# Patient Record
Sex: FEMALE | Race: WHITE | NOT HISPANIC OR LATINO | Employment: OTHER | URBAN - METROPOLITAN AREA
[De-identification: names, ages, dates, MRNs, and addresses within clinical notes are randomized per-mention and may not be internally consistent; named-entity substitution may affect disease eponyms.]

---

## 2017-05-08 ENCOUNTER — OFFICE VISIT - HEALTHEAST (OUTPATIENT)
Dept: FAMILY MEDICINE | Facility: CLINIC | Age: 82
End: 2017-05-08

## 2017-05-08 DIAGNOSIS — R22.2 LUMP IN CHEST: ICD-10-CM

## 2017-05-08 DIAGNOSIS — E03.9 HYPOTHYROIDISM: ICD-10-CM

## 2017-05-08 DIAGNOSIS — I10 ESSENTIAL HYPERTENSION: ICD-10-CM

## 2017-05-08 DIAGNOSIS — R53.83 FATIGUE: ICD-10-CM

## 2017-05-08 ASSESSMENT — MIFFLIN-ST. JEOR: SCORE: 1104.93

## 2017-05-10 ENCOUNTER — COMMUNICATION - HEALTHEAST (OUTPATIENT)
Dept: FAMILY MEDICINE | Facility: CLINIC | Age: 82
End: 2017-05-10

## 2017-06-08 ENCOUNTER — RECORDS - HEALTHEAST (OUTPATIENT)
Dept: GENERAL RADIOLOGY | Facility: CLINIC | Age: 82
End: 2017-06-08

## 2017-06-08 ENCOUNTER — OFFICE VISIT - HEALTHEAST (OUTPATIENT)
Dept: FAMILY MEDICINE | Facility: CLINIC | Age: 82
End: 2017-06-08

## 2017-06-08 DIAGNOSIS — R22.9 LOCALIZED SWELLING, MASS AND LUMP, UNSPECIFIED: ICD-10-CM

## 2017-06-08 DIAGNOSIS — R22.2 LUMP IN CHEST: ICD-10-CM

## 2017-06-08 ASSESSMENT — MIFFLIN-ST. JEOR: SCORE: 1104.93

## 2017-06-21 ENCOUNTER — COMMUNICATION - HEALTHEAST (OUTPATIENT)
Dept: FAMILY MEDICINE | Facility: CLINIC | Age: 82
End: 2017-06-21

## 2017-08-07 ENCOUNTER — COMMUNICATION - HEALTHEAST (OUTPATIENT)
Dept: FAMILY MEDICINE | Facility: CLINIC | Age: 82
End: 2017-08-07

## 2017-08-11 ENCOUNTER — OFFICE VISIT - HEALTHEAST (OUTPATIENT)
Dept: RHEUMATOLOGY | Facility: CLINIC | Age: 82
End: 2017-08-11

## 2017-08-11 DIAGNOSIS — M25.511 PAIN OF RIGHT STERNOCLAVICULAR JOINT: ICD-10-CM

## 2017-08-11 DIAGNOSIS — M17.0 PRIMARY OSTEOARTHRITIS OF BOTH KNEES: ICD-10-CM

## 2017-08-11 DIAGNOSIS — M89.8X8 STERNAL MASS: ICD-10-CM

## 2017-08-11 ASSESSMENT — MIFFLIN-ST. JEOR: SCORE: 1113.1

## 2017-08-21 ENCOUNTER — COMMUNICATION - HEALTHEAST (OUTPATIENT)
Dept: FAMILY MEDICINE | Facility: CLINIC | Age: 82
End: 2017-08-21

## 2017-08-21 DIAGNOSIS — E03.9 HYPOTHYROID: ICD-10-CM

## 2017-08-25 ENCOUNTER — HOSPITAL ENCOUNTER (OUTPATIENT)
Dept: CT IMAGING | Facility: HOSPITAL | Age: 82
Discharge: HOME | End: 2017-08-25
Attending: INTERNAL MEDICINE

## 2017-08-25 DIAGNOSIS — M89.9 DISORDER OF BONE, UNSPECIFIED: ICD-10-CM

## 2017-08-25 DIAGNOSIS — M89.8X8 STERNAL MASS: ICD-10-CM

## 2017-08-25 DIAGNOSIS — M25.511 PAIN OF RIGHT STERNOCLAVICULAR JOINT: ICD-10-CM

## 2017-08-30 ENCOUNTER — COMMUNICATION - HEALTHEAST (OUTPATIENT)
Dept: FAMILY MEDICINE | Facility: CLINIC | Age: 82
End: 2017-08-30

## 2017-08-30 DIAGNOSIS — F41.9 ANXIETY: ICD-10-CM

## 2017-11-09 ENCOUNTER — OFFICE VISIT - HEALTHEAST (OUTPATIENT)
Dept: RHEUMATOLOGY | Facility: CLINIC | Age: 82
End: 2017-11-09

## 2017-11-09 DIAGNOSIS — M17.0 PRIMARY OSTEOARTHRITIS OF BOTH KNEES: ICD-10-CM

## 2017-11-09 ASSESSMENT — MIFFLIN-ST. JEOR: SCORE: 1113.1

## 2017-12-08 ENCOUNTER — OFFICE VISIT - HEALTHEAST (OUTPATIENT)
Dept: FAMILY MEDICINE | Facility: CLINIC | Age: 82
End: 2017-12-08

## 2017-12-08 DIAGNOSIS — R53.83 FATIGUE: ICD-10-CM

## 2017-12-08 DIAGNOSIS — E03.9 HYPOTHYROIDISM: ICD-10-CM

## 2017-12-08 ASSESSMENT — MIFFLIN-ST. JEOR: SCORE: 1114

## 2017-12-12 ENCOUNTER — COMMUNICATION - HEALTHEAST (OUTPATIENT)
Dept: FAMILY MEDICINE | Facility: CLINIC | Age: 82
End: 2017-12-12

## 2017-12-12 DIAGNOSIS — F41.9 ANXIETY: ICD-10-CM

## 2018-06-11 ENCOUNTER — COMMUNICATION - HEALTHEAST (OUTPATIENT)
Dept: FAMILY MEDICINE | Facility: CLINIC | Age: 83
End: 2018-06-11

## 2018-06-11 DIAGNOSIS — E03.9 HYPOTHYROID: ICD-10-CM

## 2018-09-17 ENCOUNTER — OFFICE VISIT - HEALTHEAST (OUTPATIENT)
Dept: FAMILY MEDICINE | Facility: CLINIC | Age: 83
End: 2018-09-17

## 2018-09-17 DIAGNOSIS — E78.5 HYPERLIPIDEMIA: ICD-10-CM

## 2018-09-17 DIAGNOSIS — E03.9 HYPOTHYROIDISM: ICD-10-CM

## 2018-09-17 DIAGNOSIS — I10 ESSENTIAL HYPERTENSION: ICD-10-CM

## 2018-09-17 DIAGNOSIS — R53.83 FATIGUE: ICD-10-CM

## 2018-09-17 LAB
ALBUMIN SERPL-MCNC: 3.8 G/DL (ref 3.5–5)
ALP SERPL-CCNC: 60 U/L (ref 45–120)
ALT SERPL W P-5'-P-CCNC: 11 U/L (ref 0–45)
ANION GAP SERPL CALCULATED.3IONS-SCNC: 10 MMOL/L (ref 5–18)
AST SERPL W P-5'-P-CCNC: 16 U/L (ref 0–40)
BILIRUB DIRECT SERPL-MCNC: <0.1 MG/DL
BILIRUB SERPL-MCNC: 0.3 MG/DL (ref 0–1)
BUN SERPL-MCNC: 26 MG/DL (ref 8–28)
CALCIUM SERPL-MCNC: 9.7 MG/DL (ref 8.5–10.5)
CHLORIDE BLD-SCNC: 106 MMOL/L (ref 98–107)
CO2 SERPL-SCNC: 25 MMOL/L (ref 22–31)
CREAT SERPL-MCNC: 1.16 MG/DL (ref 0.6–1.1)
ERYTHROCYTE [DISTWIDTH] IN BLOOD BY AUTOMATED COUNT: 13.9 % (ref 11–14.5)
FERRITIN SERPL-MCNC: 14 NG/ML (ref 10–130)
GFR SERPL CREATININE-BSD FRML MDRD: 45 ML/MIN/1.73M2
GLUCOSE BLD-MCNC: 98 MG/DL (ref 70–125)
HCT VFR BLD AUTO: 42 % (ref 35–47)
HGB BLD-MCNC: 13.9 G/DL (ref 12–16)
MCH RBC QN AUTO: 29.6 PG (ref 27–34)
MCHC RBC AUTO-ENTMCNC: 33 G/DL (ref 32–36)
MCV RBC AUTO: 90 FL (ref 80–100)
PLATELET # BLD AUTO: 167 THOU/UL (ref 140–440)
PMV BLD AUTO: 7.2 FL (ref 7–10)
POTASSIUM BLD-SCNC: 4.8 MMOL/L (ref 3.5–5)
PROT SERPL-MCNC: 6.7 G/DL (ref 6–8)
RBC # BLD AUTO: 4.69 MILL/UL (ref 3.8–5.4)
SODIUM SERPL-SCNC: 141 MMOL/L (ref 136–145)
TSH SERPL DL<=0.005 MIU/L-ACNC: 1.63 UIU/ML (ref 0.3–5)
WBC: 6.1 THOU/UL (ref 4–11)

## 2018-09-18 LAB — 25(OH)D3 SERPL-MCNC: 21.4 NG/ML (ref 30–80)

## 2018-12-04 ENCOUNTER — COMMUNICATION - HEALTHEAST (OUTPATIENT)
Dept: FAMILY MEDICINE | Facility: CLINIC | Age: 83
End: 2018-12-04

## 2018-12-04 DIAGNOSIS — F41.9 ANXIETY: ICD-10-CM

## 2019-01-19 ENCOUNTER — COMMUNICATION - HEALTHEAST (OUTPATIENT)
Dept: FAMILY MEDICINE | Facility: CLINIC | Age: 84
End: 2019-01-19

## 2019-01-19 DIAGNOSIS — F41.9 ANXIETY: ICD-10-CM

## 2019-03-26 ENCOUNTER — COMMUNICATION - HEALTHEAST (OUTPATIENT)
Dept: FAMILY MEDICINE | Facility: CLINIC | Age: 84
End: 2019-03-26

## 2019-03-26 DIAGNOSIS — E03.9 HYPOTHYROID: ICD-10-CM

## 2019-04-30 ENCOUNTER — COMMUNICATION - HEALTHEAST (OUTPATIENT)
Dept: FAMILY MEDICINE | Facility: CLINIC | Age: 84
End: 2019-04-30

## 2019-04-30 DIAGNOSIS — F41.9 ANXIETY: ICD-10-CM

## 2019-05-23 ENCOUNTER — COMMUNICATION - HEALTHEAST (OUTPATIENT)
Dept: FAMILY MEDICINE | Facility: CLINIC | Age: 84
End: 2019-05-23

## 2019-06-14 ENCOUNTER — OFFICE VISIT - HEALTHEAST (OUTPATIENT)
Dept: FAMILY MEDICINE | Facility: CLINIC | Age: 84
End: 2019-06-14

## 2019-06-14 DIAGNOSIS — N18.30 CHRONIC KIDNEY DISEASE, STAGE III (MODERATE) (H): ICD-10-CM

## 2019-06-14 DIAGNOSIS — E78.5 HYPERLIPIDEMIA, UNSPECIFIED HYPERLIPIDEMIA TYPE: ICD-10-CM

## 2019-06-14 DIAGNOSIS — R79.9 ABNORMAL FINDING OF BLOOD CHEMISTRY: ICD-10-CM

## 2019-06-14 DIAGNOSIS — R07.89 STERNUM PAIN: ICD-10-CM

## 2019-06-14 DIAGNOSIS — E03.9 HYPOTHYROIDISM, UNSPECIFIED TYPE: ICD-10-CM

## 2019-06-14 DIAGNOSIS — I10 ESSENTIAL HYPERTENSION: ICD-10-CM

## 2019-06-14 LAB
ANION GAP SERPL CALCULATED.3IONS-SCNC: 10 MMOL/L (ref 5–18)
BUN SERPL-MCNC: 20 MG/DL (ref 8–28)
CALCIUM SERPL-MCNC: 9.4 MG/DL (ref 8.5–10.5)
CHLORIDE BLD-SCNC: 103 MMOL/L (ref 98–107)
CO2 SERPL-SCNC: 26 MMOL/L (ref 22–31)
CREAT SERPL-MCNC: 0.94 MG/DL (ref 0.6–1.1)
ERYTHROCYTE [DISTWIDTH] IN BLOOD BY AUTOMATED COUNT: 13 % (ref 11–14.5)
FERRITIN SERPL-MCNC: 14 NG/ML (ref 10–130)
GFR SERPL CREATININE-BSD FRML MDRD: 57 ML/MIN/1.73M2
GLUCOSE BLD-MCNC: 96 MG/DL (ref 70–125)
HCT VFR BLD AUTO: 39.9 % (ref 35–47)
HGB BLD-MCNC: 13.4 G/DL (ref 12–16)
MCH RBC QN AUTO: 30.2 PG (ref 27–34)
MCHC RBC AUTO-ENTMCNC: 33.6 G/DL (ref 32–36)
MCV RBC AUTO: 90 FL (ref 80–100)
PLATELET # BLD AUTO: 190 THOU/UL (ref 140–440)
PMV BLD AUTO: 6.9 FL (ref 7–10)
POTASSIUM BLD-SCNC: 4.3 MMOL/L (ref 3.5–5)
RBC # BLD AUTO: 4.44 MILL/UL (ref 3.8–5.4)
SODIUM SERPL-SCNC: 139 MMOL/L (ref 136–145)
TSH SERPL DL<=0.005 MIU/L-ACNC: 1.47 UIU/ML (ref 0.3–5)
WBC: 4.7 THOU/UL (ref 4–11)

## 2019-06-18 ENCOUNTER — HOSPITAL ENCOUNTER (OUTPATIENT)
Dept: CT IMAGING | Facility: HOSPITAL | Age: 84
Discharge: HOME | End: 2019-06-18
Attending: FAMILY MEDICINE

## 2019-06-18 DIAGNOSIS — R07.89 STERNUM PAIN: ICD-10-CM

## 2019-06-25 ENCOUNTER — COMMUNICATION - HEALTHEAST (OUTPATIENT)
Dept: FAMILY MEDICINE | Facility: CLINIC | Age: 84
End: 2019-06-25

## 2019-06-26 ENCOUNTER — COMMUNICATION - HEALTHEAST (OUTPATIENT)
Dept: FAMILY MEDICINE | Facility: CLINIC | Age: 84
End: 2019-06-26

## 2019-06-26 ENCOUNTER — AMBULATORY - HEALTHEAST (OUTPATIENT)
Dept: FAMILY MEDICINE | Facility: CLINIC | Age: 84
End: 2019-06-26

## 2019-07-17 ENCOUNTER — RECORDS - HEALTHEAST (OUTPATIENT)
Dept: ADMINISTRATIVE | Facility: OTHER | Age: 84
End: 2019-07-17

## 2019-08-06 ENCOUNTER — COMMUNICATION - HEALTHEAST (OUTPATIENT)
Dept: FAMILY MEDICINE | Facility: CLINIC | Age: 84
End: 2019-08-06

## 2019-08-06 DIAGNOSIS — F41.9 ANXIETY: ICD-10-CM

## 2019-08-30 ENCOUNTER — OFFICE VISIT - HEALTHEAST (OUTPATIENT)
Dept: FAMILY MEDICINE | Facility: CLINIC | Age: 84
End: 2019-08-30

## 2019-08-30 DIAGNOSIS — M89.8X8 STERNAL MASS: ICD-10-CM

## 2019-08-30 DIAGNOSIS — E03.9 HYPOTHYROIDISM, UNSPECIFIED TYPE: ICD-10-CM

## 2019-08-30 DIAGNOSIS — R53.83 FATIGUE, UNSPECIFIED TYPE: ICD-10-CM

## 2019-08-30 DIAGNOSIS — N18.30 CHRONIC KIDNEY DISEASE, STAGE III (MODERATE) (H): ICD-10-CM

## 2019-09-25 ENCOUNTER — RECORDS - HEALTHEAST (OUTPATIENT)
Dept: ADMINISTRATIVE | Facility: OTHER | Age: 84
End: 2019-09-25

## 2019-09-30 ENCOUNTER — COMMUNICATION - HEALTHEAST (OUTPATIENT)
Dept: FAMILY MEDICINE | Facility: CLINIC | Age: 84
End: 2019-09-30

## 2019-09-30 DIAGNOSIS — E03.9 HYPOTHYROID: ICD-10-CM

## 2019-10-29 ENCOUNTER — RECORDS - HEALTHEAST (OUTPATIENT)
Dept: ADMINISTRATIVE | Facility: OTHER | Age: 84
End: 2019-10-29

## 2019-11-04 ENCOUNTER — OFFICE VISIT - HEALTHEAST (OUTPATIENT)
Dept: FAMILY MEDICINE | Facility: CLINIC | Age: 84
End: 2019-11-04

## 2019-11-04 DIAGNOSIS — I10 ESSENTIAL HYPERTENSION: ICD-10-CM

## 2019-11-04 DIAGNOSIS — M89.8X8 STERNAL MASS: ICD-10-CM

## 2019-11-04 DIAGNOSIS — M25.512 PAIN OF LEFT STERNOCLAVICULAR JOINT: ICD-10-CM

## 2019-11-04 DIAGNOSIS — E03.9 HYPOTHYROIDISM, UNSPECIFIED TYPE: ICD-10-CM

## 2020-01-14 ENCOUNTER — COMMUNICATION - HEALTHEAST (OUTPATIENT)
Dept: FAMILY MEDICINE | Facility: CLINIC | Age: 85
End: 2020-01-14

## 2020-01-14 DIAGNOSIS — F41.9 ANXIETY: ICD-10-CM

## 2020-01-21 ENCOUNTER — OFFICE VISIT - HEALTHEAST (OUTPATIENT)
Dept: FAMILY MEDICINE | Facility: CLINIC | Age: 85
End: 2020-01-21

## 2020-01-21 DIAGNOSIS — R03.0 ELEVATED BLOOD PRESSURE READING WITHOUT DIAGNOSIS OF HYPERTENSION: ICD-10-CM

## 2020-01-21 DIAGNOSIS — H61.21 IMPACTED CERUMEN OF RIGHT EAR: ICD-10-CM

## 2020-01-21 ASSESSMENT — MIFFLIN-ST. JEOR: SCORE: 1121.09

## 2020-02-19 ENCOUNTER — COMMUNICATION - HEALTHEAST (OUTPATIENT)
Dept: FAMILY MEDICINE | Facility: CLINIC | Age: 85
End: 2020-02-19

## 2020-02-19 DIAGNOSIS — F41.9 ANXIETY: ICD-10-CM

## 2020-02-22 RX ORDER — SERTRALINE HYDROCHLORIDE 100 MG/1
TABLET, FILM COATED ORAL
Qty: 90 TABLET | Refills: 2 | Status: SHIPPED | OUTPATIENT
Start: 2020-02-22 | End: 2021-07-13

## 2020-06-16 ENCOUNTER — RECORDS - HEALTHEAST (OUTPATIENT)
Dept: ADMINISTRATIVE | Facility: OTHER | Age: 85
End: 2020-06-16

## 2020-07-15 ENCOUNTER — COMMUNICATION - HEALTHEAST (OUTPATIENT)
Dept: FAMILY MEDICINE | Facility: CLINIC | Age: 85
End: 2020-07-15

## 2020-07-15 DIAGNOSIS — E03.9 HYPOTHYROID: ICD-10-CM

## 2020-08-21 ENCOUNTER — COMMUNICATION - HEALTHEAST (OUTPATIENT)
Dept: FAMILY MEDICINE | Facility: CLINIC | Age: 85
End: 2020-08-21

## 2020-08-21 DIAGNOSIS — E03.9 HYPOTHYROID: ICD-10-CM

## 2020-08-26 RX ORDER — LEVOTHYROXINE SODIUM 50 UG/1
50 TABLET ORAL DAILY
Qty: 90 TABLET | Refills: 1 | Status: SHIPPED | OUTPATIENT
Start: 2020-08-26 | End: 2021-07-13

## 2020-09-18 ENCOUNTER — RECORDS - HEALTHEAST (OUTPATIENT)
Dept: ADMINISTRATIVE | Facility: OTHER | Age: 85
End: 2020-09-18

## 2021-01-05 ENCOUNTER — RECORDS - HEALTHEAST (OUTPATIENT)
Dept: ADMINISTRATIVE | Facility: OTHER | Age: 86
End: 2021-01-05

## 2021-04-01 ENCOUNTER — COMMUNICATION - HEALTHEAST (OUTPATIENT)
Dept: FAMILY MEDICINE | Facility: CLINIC | Age: 86
End: 2021-04-01

## 2021-04-03 ENCOUNTER — COMMUNICATION - HEALTHEAST (OUTPATIENT)
Dept: FAMILY MEDICINE | Facility: CLINIC | Age: 86
End: 2021-04-03

## 2021-04-03 DIAGNOSIS — F41.9 ANXIETY: ICD-10-CM

## 2021-04-03 RX ORDER — TRAZODONE HYDROCHLORIDE 50 MG/1
TABLET, FILM COATED ORAL
Qty: 180 TABLET | Refills: 2 | Status: SHIPPED | OUTPATIENT
Start: 2021-04-03 | End: 2022-01-15

## 2021-04-06 ENCOUNTER — RECORDS - HEALTHEAST (OUTPATIENT)
Dept: ADMINISTRATIVE | Facility: OTHER | Age: 86
End: 2021-04-06

## 2021-05-24 ENCOUNTER — RECORDS - HEALTHEAST (OUTPATIENT)
Dept: ADMINISTRATIVE | Facility: CLINIC | Age: 86
End: 2021-05-24

## 2021-05-25 ENCOUNTER — RECORDS - HEALTHEAST (OUTPATIENT)
Dept: ADMINISTRATIVE | Facility: CLINIC | Age: 86
End: 2021-05-25

## 2021-05-26 ENCOUNTER — RECORDS - HEALTHEAST (OUTPATIENT)
Dept: ADMINISTRATIVE | Facility: CLINIC | Age: 86
End: 2021-05-26

## 2021-05-27 ENCOUNTER — RECORDS - HEALTHEAST (OUTPATIENT)
Dept: ADMINISTRATIVE | Facility: CLINIC | Age: 86
End: 2021-05-27

## 2021-05-28 ENCOUNTER — RECORDS - HEALTHEAST (OUTPATIENT)
Dept: ADMINISTRATIVE | Facility: CLINIC | Age: 86
End: 2021-05-28

## 2021-05-28 NOTE — TELEPHONE ENCOUNTER
Refill Approved    Rx renewed per Medication Renewal Policy. Medication was last renewed on 1/19/19.    Kita Mitchell, [facility] Triage/Med Refill 5/1/2019     Requested Prescriptions   Pending Prescriptions Disp Refills     sertraline (ZOLOFT) 100 MG tablet [Pharmacy Med Name: SERTRALINE  MG TABLET] 90 tablet 0     Sig: TAKE 1 TABLET (100 MG TOTAL) BY MOUTH DAILY.       SS[facility] Refill Protocol  Passed - 4/30/2019 12:18 PM        Passed - PCP or prescribing provider visit in last year     Last office visit with prescriber/PCP: 9/17/2018 Edin Dailey MD OR same dept: 9/17/2018 Edin Dailey MD OR same specialty: 9/17/2018 Edin Dailey MD  Last physical: Visit date not found Last MTM visit: Visit date not found   Next visit within 3 mo: Visit date not found  Next physical within 3 mo: Visit date not found  Prescriber OR PCP: Edin Dailey MD  Last diagnosis associated with med order: 1. Anxiety  - sertraline (ZOLOFT) 100 MG tablet [Pharmacy Med Name: SERTRALINE  MG TABLET]; TAKE 1 TABLET (100 MG TOTAL) BY MOUTH DAILY.  Dispense: 90 tablet; Refill: 0    If protocol passes may refill for 12 months if within 3 months of last provider visit (or a total of 15 months).

## 2021-05-29 ENCOUNTER — RECORDS - HEALTHEAST (OUTPATIENT)
Dept: ADMINISTRATIVE | Facility: CLINIC | Age: 86
End: 2021-05-29

## 2021-05-29 NOTE — PROGRESS NOTES
Assessment/ Plan     1. Hypertension, currently stable    Continue to monitor blood pressure  Check laboratory testing as noted  - Basic Metabolic Panel  - HM2(CBC w/o Differential)  - Ferritin    2. Hyperlipidemia, unspecified hyperlipidemia type    Check a lipid cascade in the future  Calculate 10-year cardiovascular risk    3. Hypothyroidism, unspecified type    Continue levothyroxine  Check a thyroid cascade and adjust levothyroxine as appropriate  - Thyroid Cascade    4. Chronic kidney disease, stage III (moderate) (H)    Continue to monitor kidney function  Recommend avoiding NSAIDs  Recommend adequate control blood pressure  Continue to monitor    5. Sternum pain  May be secondary to significant osteoarthritis    The skin of the chest given that this has changed significantly  - CT Chest With Contrast; Future    6. Abnormal finding of blood chemistry   History of low ferritin    Check a ferritin level  - Ferritin    7. Vitamin D deficiency    Recommend vitamin D3 supplementation      Subjective:       Remedios Vincent is a 85 y.o. female who presents  to the clinic in follow-up.  She has no history of hypertension, hyperlipidemia, hypothyroidism, and chronic kidney disease.  In the past she has followed up her significant fatigue.  She reports feeling tired much of the time.  She did previously have an evaluation at [facility] and had an iron deficiency anemia at that time.  They did discuss possible iron infusions if her ferritin level would drop.    She does have a history of significant anxiety and depression symptoms and takes sertraline.  For sleep she takes trazodone typically.  Fatigue has been an ongoing issue.  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  She has noted that her sternum has changed in appearance and this can cause some discomfort.  She would like to have this evaluated.  She has had an extensive evaluation previously conservative treatment has  been recommended.    She continues to live independently.  Is a good social support and is active and playing cards.  She interacts with others. She denies having significant depression symptoms currently.      Review of systems is otherwise negative for headache, dizziness, chest pain, palpitations, or other concerns.       The following portions of the patient's history were reviewed and updated as appropriate: allergies, current medications, past family history, past medical history, past social history, past surgical history and problem list. Medications have been reconciled    Review of Systems   A 12 point comprehensive review of systems was negative except as noted.      Current Outpatient Medications   Medication Sig Dispense Refill     levothyroxine (SYNTHROID, LEVOTHROID) 50 MCG tablet TAKE 1 TABLET (50 MCG TOTAL) BY MOUTH DAILY. 90 tablet 1     sertraline (ZOLOFT) 100 MG tablet TAKE 1 TABLET (100 MG TOTAL) BY MOUTH DAILY. 90 tablet 0     traZODone (DESYREL) 50 MG tablet TAKE 1-2 TABLETS BY MOUTH EVERY NIGHT AT BEDTIME AS NEEDED . 180 tablet 2     No current facility-administered medications for this visit.        Objective:      /88   Pulse 88   Wt 150 lb 9.6 oz (68.3 kg)   BMI 25.85 kg/m        General appearance: alert, appears stated age and cooperative  Head: Normocephalic, without obvious abnormality, atraumatic  Eyes: conjunctivae/corneas clear. PERRL, EOM's intact.   Nose: Nares normal. Septum midline. Mucosa normal. No drainage or sinus tenderness.  Throat: lips, mucosa, and tongue normal; teeth and gums normal  Neck: no adenopathy, supple, symmetrical, trachea midline   Lungs: clear to auscultation bilaterally  Heart: regular rate and rhythm, S1, S2 normal, no murmur, click, rub or gallop  Chest: There is a palpable firm protuberance noted overlying the sternum  There is hypertrophy noted at the left sternoclavicular joint  Extremities: extremities normal, atraumatic, no cyanosis or  edema  Skin: Skin color, texture, turgor normal. No rashes or lesions  Lymph nodes: Cervical nodes normal.  Neurologic: Alert and oriented X 3         No results found for this or any previous visit (from the past 168 hour(s)).       This note has been dictated using voice recognition software. Any grammatical or context distortions are unintentional and inherent to the software

## 2021-05-29 NOTE — PATIENT INSTRUCTIONS - HE
We will check the tests as we described  I recommend vitamin D3, 2,000 units daily  Set up the CT scan of the chest as discussed.

## 2021-05-30 VITALS — HEIGHT: 64 IN | WEIGHT: 151 LBS | BODY MASS INDEX: 25.78 KG/M2

## 2021-05-30 NOTE — TELEPHONE ENCOUNTER
I called and reviewed.  Her CT scan showed degenerative changes but no worrisome findings.  She does have a hiatal hernia  and reflux symptoms have been well controlled.  She denies any respiratory concerns.  Reviewed her laboratory testing as well.

## 2021-05-30 NOTE — TELEPHONE ENCOUNTER
Test Results  Who is calling?:  Patient  Who ordered the test:  Edin Dailey MD   Type of test: Lab and Imaging  Date of test:  Labs 6/14/19 and CT on 6/18/19  Where was the test performed:  [facility]  What are your questions/concerns?:  Patient stated she has not received her results yet. Please call her and mail her results.  Okay to leave a detailed message?:  Yes

## 2021-05-31 ENCOUNTER — RECORDS - HEALTHEAST (OUTPATIENT)
Dept: ADMINISTRATIVE | Facility: CLINIC | Age: 86
End: 2021-05-31

## 2021-05-31 VITALS — BODY MASS INDEX: 25.78 KG/M2 | HEIGHT: 64 IN | WEIGHT: 151 LBS

## 2021-05-31 VITALS — BODY MASS INDEX: 26.09 KG/M2 | WEIGHT: 152.8 LBS | HEIGHT: 64 IN

## 2021-05-31 VITALS — WEIGHT: 152.8 LBS | BODY MASS INDEX: 26.09 KG/M2 | HEIGHT: 64 IN

## 2021-05-31 VITALS — BODY MASS INDEX: 26.12 KG/M2 | WEIGHT: 153 LBS | HEIGHT: 64 IN

## 2021-05-31 NOTE — TELEPHONE ENCOUNTER
RN cannot approve Refill Request    RN can NOT refill this medication discontinued on 6/26/2019 by Edin Dailey MD for the following reason: Therapy completed. Last office visit: 6/14/2019 Edin Dailey MD Last Physical: Visit date not found Last MTM visit: Visit date not found Last visit same specialty: 6/14/2019 Edin Dailey MD.  Next visit within 3 mo: Visit date not found  Next physical within 3 mo: Visit date not found      Fransisco Musa, [facility] Triage/Med Refill 8/6/2019    Requested Prescriptions   Pending Prescriptions Disp Refills     sertraline (ZOLOFT) 100 MG tablet [Pharmacy Med Name: SERTRALINE  MG TABLET] 90 tablet 0     Sig: TAKE 1 TABLET (100 MG TOTAL) BY MOUTH DAILY.       SSRI Refill Protocol  Passed - 8/6/2019  1:37 AM        Passed - PCP or prescribing provider visit in last year     Last office visit with prescriber/PCP: 6/14/2019 Edin Dailey MD OR same dept: 6/14/2019 Edin Dailey MD OR same specialty: 6/14/2019 Edin Dailey MD  Last physical: Visit date not found Last MTM visit: Visit date not found   Next visit within 3 mo: Visit date not found  Next physical within 3 mo: Visit date not found  Prescriber OR PCP: Edin Dailey MD  Last diagnosis associated with med order: 1. Anxiety  - sertraline (ZOLOFT) 100 MG tablet [Pharmacy Med Name: SERTRALINE  MG TABLET]; TAKE 1 TABLET (100 MG TOTAL) BY MOUTH DAILY.  Dispense: 90 tablet; Refill: 0    If protocol passes may refill for 12 months if within 3 months of last provider visit (or a total of 15 months).

## 2021-05-31 NOTE — PATIENT INSTRUCTIONS - HE
You can consider trazodone (1-2 a day)  You can try melatonin as well 3 mg  Continue sertraline and levothyroxine at current doses

## 2021-06-01 NOTE — TELEPHONE ENCOUNTER
Refill Approved    Rx renewed per Medication Renewal Policy. Medication was last renewed on 3/26/19.    Kita Mitchell, [facility] Triage/Med Refill 9/30/2019     Requested Prescriptions   Pending Prescriptions Disp Refills     levothyroxine (SYNTHROID, LEVOTHROID) 50 MCG tablet [Pharmacy Med Name: LEVOTHYROXINE 50 MCG TABLET] 90 tablet 1     Sig: TAKE 1 TABLET (50 MCG TOTAL) BY MOUTH DAILY.       Thyroid Hormones Protocol Passed - 9/30/2019  2:16 AM        Passed - Provider visit in past 12 months or next 3 months     Last office visit with prescriber/PCP: 8/30/2019 Edin Dailey MD OR same dept: 8/30/2019 Edin Dailey MD OR same specialty: 8/30/2019 Edin Dailey MD  Last physical: Visit date not found Last MTM visit: Visit date not found   Next visit within 3 mo: Visit date not found  Next physical within 3 mo: Visit date not found  Prescriber OR PCP: Edin Dailey MD  Last diagnosis associated with med order: 1. Hypothyroid  - levothyroxine (SYNTHROID, LEVOTHROID) 50 MCG tablet [Pharmacy Med Name: LEVOTHYROXINE 50 MCG TABLET]; TAKE 1 TABLET (50 MCG TOTAL) BY MOUTH DAILY.  Dispense: 90 tablet; Refill: 1    If protocol passes may refill for 12 months if within 3 months of last provider visit (or a total of 15 months).             Passed - TSH on file in past 12 months for patient age 12 & older     TSH   Date Value Ref Range Status   06/14/2019 1.47 0.30 - 5.00 uIU/mL Final

## 2021-06-01 NOTE — PROGRESS NOTES
Assessment/ Plan     1. Sternal mass    She has significant arthritic changes at the left sternoclavicular joint  She has previously followed up with rheumatology  We will continue to monitor.  If causing any kind of obstructive symptoms that we will get surgical consultation  At this point this is not impeding breathing or eating and patient will monitor    2. Hypothyroidism, unspecified type    TSH was not June, 2019  Continue levothyroxine    3. Fatigue, unspecified type  Likely multifactorial  Her hemoglobin is normal but ferritin is borderline low  Consider secondary to aging  She does have  hypothyroidism but that is relatively well controlled    Reviewed sleep hygiene  Patient may increase trazodone to  mg nightly    4. Chronic kidney disease, stage III (moderate) (H)    Recommend avoiding NSAIDs  Continue to monitor    5. Anxiety    Continue sertraline  Follow-up as advised      Subjective:       Remedios Vincent is a 85 y.o. female who presents to the clinic in follow-up.  She has a known history of hypertension, hyperlipidemia, hypothyroidism, and chronic kidney disease.  Since the last visit she was referred for CT scan of the chest as she has a prominent area of arthritis at the sternoclavicular joint.  She does feel that this is gotten bigger but denies any concerns with breathing or with swallowing food at this time.  This will be monitored.  Additionally, she does have a history of fatigue.  She has followed up with the [facility] in the past and did have a history of iron deficiency.  Her ferritin level is borderline low but hemoglobin is normal.  Iron infusions were discussed in the past.  She continues to take levothyroxine and her most recent thyroid testing was normal.  For her anxiety and mood concerns she takes sertraline and denies feeling significantly depressed.  She is social and playing cards and has good support though a relationship with a son is strained.  Her sleep pattern can be  poor and she will increase trazodone as noted    She denies chest pain, shortness of breath, palpitations, or other concerns at this time.       The following portions of the patient's history were reviewed and updated as appropriate: allergies, current medications, past family history, past medical history, past social history, past surgical history and problem list. Medications have been reconciled    Review of Systems   A 12 point comprehensive review of systems was negative except as noted.      Current Outpatient Medications   Medication Sig Dispense Refill     levothyroxine (SYNTHROID, LEVOTHROID) 50 MCG tablet TAKE 1 TABLET (50 MCG TOTAL) BY MOUTH DAILY. 90 tablet 1     sertraline (ZOLOFT) 100 MG tablet TAKE 1 TABLET (100 MG TOTAL) BY MOUTH DAILY. 90 tablet 1     traZODone (DESYREL) 50 MG tablet TAKE 1-2 TABLETS BY MOUTH EVERY NIGHT AT BEDTIME AS NEEDED . 180 tablet 2     No current facility-administered medications for this visit.        Objective:      /84   Pulse 84   Wt 154 lb 9.6 oz (70.1 kg)   BMI 26.54 kg/m        General appearance: alert, appears stated age and cooperative  Head: Normocephalic, without obvious abnormality, atraumatic  Eyes: conjunctivae/corneas clear. PERRL, EOM's intact.   Nose: Nares normal. Septum midline. Mucosa normal. No drainage or sinus tenderness.  Throat: lips, mucosa, and tongue normal; teeth and gums normal  Neck: no adenopathy, supple, symmetrical  Chest: There is a significant prominence at the sternoclavicular joint  Back: symmetric, no curvature. ROM normal. No CVA tenderness.  Lungs: clear to auscultation bilaterally  Heart: regular rate and rhythm, S1, S2 normal, no murmur, click, rub or gallop  Extremities: extremities normal, atraumatic, no cyanosis or edema  Skin: Skin color, texture, turgor normal. No rashes or lesions  Lymph nodes: Cervical nodes normal.  Neurologic: Alert and oriented X 3.      Radiology:     CT Chest With Contrast (Order [id number])    Imaging   Date: 6/18/2019 Department: [facility] CT Released By: Catie Whitt Authorizing: Edin Dailey MD   Study Result     EXAM: CT CHEST W CONTRAST  LOCATION: [facility]  DATE/TIME: 6/18/2019 3:08 PM     INDICATION: Chest wall pain. Abnormal sternum mass, chest pain.  COMPARISON: 08/25/2017.  TECHNIQUE: Helical images were obtained through the chest during injection of IV contrast. Multiplanar reformats were obtained. Dose reduction techniques were used.  CONTRAST: Iohexol (Omni) 75mL.     FINDINGS:   LUNGS AND PLEURA: Mild bronchiectasis without significant change. Negative pleural spaces.     MEDIASTINUM: No adenopathy. Aberrant right subclavian artery. Coronary calcifications. Moderate sized hiatus hernia.     LIMITED UPPER ABDOMEN: Probably stable hepatic and splenic hypodensity, suboptimally characterized. Atherosclerosis.     MUSCULOSKELETAL: Bony demineralization and diffuse degenerative changes. Prominent asymmetric degenerative change of the sternoclavicular joints, worse on the left. Surrounding hypertrophic change and soft tissue thickening present. Findings are stable   to slightly more pronounced compared to prior. Bilateral second rib costosternal junction hypertrophic changes also seen, more prominent than other costosternal junctions. No sternal fracture or mass.     IMPRESSION:   CONCLUSION:      1.  Redemonstrated prominent asymmetric left sternoclavicular predominant hypertrophic degenerative changes and surrounding thickening. Slight subluxation of the left sternoclavicular joint noted. Otherwise, no sternal mass or fracture.     2.  Diffuse bony demineralization and degenerative changes elsewhere.     3.  Mild bronchiectasis without significant change in the lungs. Minimal scarring.     4.  Moderate hiatus hernia.               No results found for this or any previous visit (from the past 168 hour(s)).       This note has been dictated using voice recognition  software. Any grammatical or context distortions are unintentional and inherent to the software

## 2021-06-02 VITALS — WEIGHT: 151.1 LBS | BODY MASS INDEX: 25.94 KG/M2

## 2021-06-03 VITALS — BODY MASS INDEX: 25.85 KG/M2 | WEIGHT: 150.6 LBS

## 2021-06-03 VITALS
WEIGHT: 156 LBS | HEART RATE: 76 BPM | BODY MASS INDEX: 26.78 KG/M2 | SYSTOLIC BLOOD PRESSURE: 158 MMHG | DIASTOLIC BLOOD PRESSURE: 90 MMHG

## 2021-06-03 VITALS — WEIGHT: 154.6 LBS | BODY MASS INDEX: 26.54 KG/M2

## 2021-06-03 NOTE — PROGRESS NOTES
Assessment/ Plan     1. Sternal mass  2. Pain of left sternoclavicular joint    Reviewed her recent history  Reviewed the CT scan showing probable osteoarthritis of the sternoclavicular joint  Patient denies any shortness of breath or difficulty with breathing  Will continue to monitor    3. Hypertension    Her blood pressure is elevated today  Her blood pressure has been stable recently  Her preference is to monitor blood pressure and follow-up for a recheck  Recommend immediate follow-up if developing a headache, visual disturbances, or focal weakness    4. Hypothyroidism, unspecified type    Continue levothyroxine  Her recent thyroid test was within normal limits      Subjective:       Remedios Vincent is a 85 y.o. female who presents to the clinic in follow-up.  She is concerned about the lump involving the left sternoclavicular joint.  She believes that this has enlarged slightly recently.  She has not experienced difficulty with swallowing and denies any shortness of breath.  She did have an evaluation including a CT scan of the chest and this showed that the prominent area was arthritis of the sternoclavicular joint.  She does have a history of hypothyroidism which has been stable.  Her recent TSH was within normal limits.  Also, she has a history of fatigue.  She has followed up with the [facility] and did have a ferritin level that was borderline low.  She states that her energy level has been stable.  She does report that she had two recent falls but states that she essentially tripped. She did not hit her head and denies visual disturbances or headache. She did not get injured. She has not had any balance concerns.  She denies any significant weakness.    Recent laboratory testing showed a mildly low hemoglobin of 13.7.  Her ferritin was borderline low. Her TSH was within normal limits.  GFR was 57.    She reports that she has been active in getting together with friends.  She has been playing cards.  She  has not experienced any confusion.  She denies chest pain, shortness of breath, or palpitations.      The following portions of the patient's history were reviewed and updated as appropriate: allergies, current medications, past family history, past medical history, past social history, past surgical history and problem list. Medications have been reconciled    Review of Systems   A 12 point comprehensive review of systems was negative except as noted.      Current Outpatient Medications   Medication Sig Dispense Refill     levothyroxine (SYNTHROID, LEVOTHROID) 50 MCG tablet TAKE 1 TABLET (50 MCG TOTAL) BY MOUTH DAILY. 90 tablet 2     sertraline (ZOLOFT) 100 MG tablet TAKE 1 TABLET (100 MG TOTAL) BY MOUTH DAILY. 90 tablet 1     traZODone (DESYREL) 50 MG tablet TAKE 1-2 TABLETS BY MOUTH EVERY NIGHT AT BEDTIME AS NEEDED . 180 tablet 2     No current facility-administered medications for this visit.        Objective:      /90   Pulse 76   Wt 156 lb (70.8 kg)   BMI 26.78 kg/m        General appearance: alert, appears stated age and cooperative  Head: Normocephalic, without obvious abnormality, atraumatic  Eyes: conjunctivae/corneas clear. PERRL, EOM's intact.   Nose: Nares normal. Septum midline. Mucosa normal  Throat: lips, mucosa, and tongue normal; teeth and gums normal  Neck: no adenopathy, supple, symmetrical, trachea midline   Chest: There is a bony protuberance at the left sternoclavicular joint  There is a prominent bony protuberance involving the left sternoclavicular joint  Lungs: clear to auscultation bilaterally  Heart: regular rate and rhythm, S1, S2 normal, no murmur, click, rub or gallop  Extremities: extremities normal, atraumatic  Neuro: Alert noted x3  Speech is clear  Judgment and insight are intact         No results found for this or any previous visit (from the past 168 hour(s)).

## 2021-06-04 VITALS
BODY MASS INDEX: 26.39 KG/M2 | TEMPERATURE: 97.6 F | HEART RATE: 84 BPM | WEIGHT: 154.56 LBS | DIASTOLIC BLOOD PRESSURE: 92 MMHG | SYSTOLIC BLOOD PRESSURE: 164 MMHG | HEIGHT: 64 IN | RESPIRATION RATE: 12 BRPM

## 2021-06-05 NOTE — PROGRESS NOTES
[facility] Note    Name: Remedios Vincent  : 1934   MRN: [id number]    Remedios Vincent is a 85 y.o. female presenting to discuss the following:     CC:   Chief Complaint   Patient presents with     Ear Fullness     Right ear     HPI:  Remedios presents today for evaluation of ear fullness, harder to hear out of right ear. No fevers, no pain.     Blood pressure was a little elevated at last appointment as well. Asymptomatic. Suspects she has had too many Dove bars recently. Previously well controlled.     ROS:   See HPI above.     PMH:   Patient Active Problem List   Diagnosis     Hypothyroidism     Hyperlipidemia     Anxiety     Hypertension     Chronic kidney disease, stage III (moderate) (H)     Lumbar Spondylosis     Lower Back Pain     Osteoporosis     Fatigue     Primary osteoarthritis of both knees     Sternal mass     Pain of left sternoclavicular joint       Past Medical History:   Diagnosis Date     Anxiety      CKD (chronic kidney disease), stage III (H)      Depression      HLD (hyperlipidemia)      HTN (hypertension)      Hypothyroidism      Lumbar spondylosis      Osteoporosis      PSH:   Past Surgical History:   Procedure Laterality Date     MT REMOVAL OF OVARY(S)      Description: Oophorectomy;  Recorded: 2014;     MT REMOVAL OF TONSILS,<13 Y/O      Description: Tonsillectomy;  Recorded: 2014;     MT TOTAL ABDOM HYSTERECTOMY      Description: Hysterectomy;  Recorded: 2014;     MT TOTAL ABDOM HYSTERECTOMY      Description: Total Abdominal Hysterectomy;  Recorded: 2014;     MEDICATIONS:   Current Outpatient Medications on File Prior to Visit   Medication Sig Dispense Refill     levothyroxine (SYNTHROID, LEVOTHROID) 50 MCG tablet TAKE 1 TABLET (50 MCG TOTAL) BY MOUTH DAILY. 90 tablet 2     sertraline (ZOLOFT) 100 MG tablet TAKE 1 TABLET (100 MG TOTAL) BY MOUTH DAILY. 90 tablet 1     traZODone (DESYREL) 50 MG tablet TAKE 1-2 TABLETS BY MOUTH EVERY NIGHT AT BEDTIME AS  "NEEDED . 180 tablet 2     No current facility-administered medications on file prior to visit.        ALLERGIES:  Allergies   Allergen Reactions     Seafood      PHYSICAL EXAM:   BP (!) 164/92   Pulse 84   Temp 97.6  F (36.4  C) (Oral)   Resp 12   Ht 5' 4\" (1.626 m)   Wt 154 lb 9 oz (70.1 kg)   BMI 26.53 kg/m     GENERAL: Remedios is a pleasant, elderly female in no acute distress.   HEENT: Right ear impacted with dark brown cerumen. Left tympanic membrane normal appearing.   HEART: Regular rate and rhythm, no murmurs.   LUNGS: Clear to auscultation bilaterally, unlabored.     ASSESSMENT & PLAN:   Remedios Vincent is a 85 y.o. female presenting today for evaluation of right plugged ear.    1. Impacted cerumen of right ear  Right cerumen impaction present. Nursing provided lavage of right ear.     2. Elevated blood pressure reading without diagnosis of hypertension  Blood pressure is uncontrolled. No history of hypertension. Asymptomatic. Was previously elevated at last appointment, normal before then. Recommended follow up in 2 weeks and if still elevated would discuss initiation of antihypertensive. Would need to use caution given age and risk of falls.      RTC: 1 month - medicare annual wellness exam, follow up elevated blood pressure / sooner as needed     Laura Henderson, DO       "

## 2021-06-05 NOTE — TELEPHONE ENCOUNTER
Refill Approved    Rx renewed per Medication Renewal Policy. Medication was last renewed on 12/5/18.    Kita Mitchell, [facility], [place] Connection Triage/Med Refill 1/16/2020     Requested Prescriptions   Pending Prescriptions Disp Refills     traZODone (DESYREL) 50 MG tablet [Pharmacy Med Name: TRAZODONE 50 MG TABLET] 180 tablet 2     Sig: TAKE 1-2 TABLETS BY MOUTH EVERY NIGHT AT BEDTIME AS NEEDED .       Tricyclics/Misc Antidepressant/Antianxiety Meds Refill Protocol Passed - 1/14/2020  2:27 AM        Passed - PCP or prescribing provider visit in last year     Last office visit with prescriber/PCP: 11/4/2019 Edin Dailey MD OR same dept: 11/4/2019 Edin Dailey MD OR same specialty: 11/4/2019 Edin Dailey MD  Last physical: Visit date not found Last MTM visit: Visit date not found   Next visit within 3 mo: Visit date not found  Next physical within 3 mo: Visit date not found  Prescriber OR PCP: Edin Dailey MD  Last diagnosis associated with med order: 1. Anxiety  - traZODone (DESYREL) 50 MG tablet [Pharmacy Med Name: TRAZODONE 50 MG TABLET]; TAKE 1-2 TABLETS BY MOUTH EVERY NIGHT AT BEDTIME AS NEEDED .  Dispense: 180 tablet; Refill: 2    If protocol passes may refill for 12 months if within 3 months of last provider visit (or a total of 15 months).

## 2021-06-09 NOTE — TELEPHONE ENCOUNTER
RN cannot approve Refill Request    RN can NOT refill this medication Protocol failed and NO refill given. Last office visit: 11/4/2019 Edin Dailey MD Last Physical: Visit date not found Last MTM visit: Visit date not found Last visit same specialty: 1/21/2020 Laura Henderson DO.  Next visit within 3 mo: Visit date not found  Next physical within 3 mo: Visit date not found      Kita Mitchell, [facility] Triage/Med Refill 7/15/2020    Requested Prescriptions   Pending Prescriptions Disp Refills     levothyroxine (SYNTHROID, LEVOTHROID) 50 MCG tablet [Pharmacy Med Name: LEVOTHYROXINE 50 MCG TABLET] 90 tablet 2     Sig: TAKE 1 TABLET (50 MCG TOTAL) BY MOUTH DAILY.       Thyroid Hormones Protocol Failed - 7/15/2020 12:34 AM        Failed - TSH on file in past 12 months for patient age 12 & older     TSH   Date Value Ref Range Status   06/14/2019 1.47 0.30 - 5.00 uIU/mL Final                   Passed - Provider visit in past 12 months or next 3 months     Last office visit with prescriber/PCP: 11/4/2019 Edin Dailey MD OR same dept: 1/21/2020 Laura Henderson DO OR same specialty: 1/21/2020 Laura Henderson DO  Last physical: Visit date not found Last MTM visit: Visit date not found   Next visit within 3 mo: Visit date not found  Next physical within 3 mo: Visit date not found  Prescriber OR PCP: Edin Dailey MD  Last diagnosis associated with med order: 1. Hypothyroid  - levothyroxine (SYNTHROID, LEVOTHROID) 50 MCG tablet [Pharmacy Med Name: LEVOTHYROXINE 50 MCG TABLET]; TAKE 1 TABLET (50 MCG TOTAL) BY MOUTH DAILY.  Dispense: 90 tablet; Refill: 2    If protocol passes may refill for 12 months if within 3 months of last provider visit (or a total of 15 months).

## 2021-06-10 NOTE — TELEPHONE ENCOUNTER
RN cannot approve Refill Request    RN can NOT refill this medication Protocol failed and NO refill given. Last office visit: 11/4/2019 Edin Dailey MD Last Physical: Visit date not found Last MTM visit: Visit date not found Last visit same specialty: 1/21/2020 Laura Henderson DO.  Next visit within 3 mo: Visit date not found  Next physical within 3 mo: Visit date not found      Kita Mitchell, [facility] Triage/Med Refill 8/24/2020    Requested Prescriptions   Pending Prescriptions Disp Refills     levothyroxine (SYNTHROID, LEVOTHROID) 50 MCG tablet [Pharmacy Med Name: LEVOTHYROXINE 50 MCG TABLET] 90 tablet 0     Sig: TAKE 1 TABLET (50 MCG TOTAL) BY MOUTH DAILY.       Thyroid Hormones Protocol Failed - 8/21/2020  2:53 PM        Failed - TSH on file in past 12 months for patient age 12 & older     TSH   Date Value Ref Range Status   06/14/2019 1.47 0.30 - 5.00 uIU/mL Final                   Passed - Provider visit in past 12 months or next 3 months     Last office visit with prescriber/PCP: 11/4/2019 Edin Dailey MD OR same dept: 1/21/2020 Laura Henderson DO OR same specialty: 1/21/2020 Laura Henderson DO  Last physical: Visit date not found Last MTM visit: Visit date not found   Next visit within 3 mo: Visit date not found  Next physical within 3 mo: Visit date not found  Prescriber OR PCP: Edin Dailey MD  Last diagnosis associated with med order: 1. Hypothyroid  - levothyroxine (SYNTHROID, LEVOTHROID) 50 MCG tablet [Pharmacy Med Name: LEVOTHYROXINE 50 MCG TABLET]; TAKE 1 TABLET (50 MCG TOTAL) BY MOUTH DAILY.  Dispense: 90 tablet; Refill: 0    If protocol passes may refill for 12 months if within 3 months of last provider visit (or a total of 15 months).

## 2021-06-10 NOTE — PROGRESS NOTES
SUBJECTIVE:    This is an 83-year-old female who presents to the clinic in follow-up.  Her primary concern has been a left upper chest lump at the junction of the clavicle and sternum.  She has had an extensive evaluation for a palpable lump in the left chest area previously.  She has noted that there is an increasing lump in the area. She denies recent injury or pain. She has not had redness or a rash. Denies chest pain or shortness of breath. However, she has had mild hoarseness of her voice. She denies fever or chills. She denies weight loss. She did have x-rays of the left clavicle that do not show any obvious mass at the time. She was referred for a CT scan of the chest and this was reviewed with radiology. It was felt that she likely has osteoarthritis there but may have synovitis as well. There was some fat stranding in the left axillary area.  Over time this area has enlarged.  She can feel the area when she swallows.  She was treated with prednisone in case there was synovitis.  Also, she has had ongoing fatigue of uncertain etiology.  She denies chest pain or shortness of breath.  She has not had fever or chills.  She continues to take levothyroxine for hypothyroidism.      Patient Active Problem List   Diagnosis     Hypothyroidism     Hyperlipidemia     Iron Deficiency     Anxiety     Hypertension     Chronic Kidney Disease, Stage 3     Lumbar Spondylosis     Lower Back Pain     Osteoporosis     Hyponatremia     Fatigue     Abdominal Pain       Current Outpatient Prescriptions on File Prior to Visit   Medication Sig     levothyroxine (SYNTHROID, LEVOTHROID) 50 MCG tablet Take 1 tablet (50 mcg total) by mouth daily.     sertraline (ZOLOFT) 100 MG tablet TAKE 1 TABLET (100 MG TOTAL) BY MOUTH DAILY.     traZODone (DESYREL) 50 MG tablet TAKE 1-2 TABLETS BY MOUTH EVERY NIGHT AT BEDTIME AS NEEDED .     No current facility-administered medications on file prior to visit.        Allergies   Allergen Reactions      Seafood             A 12 point comprehensive review of systems was negative except as noted.      OBJECTIVE:     Vitals:    05/08/17 1148   BP: 128/80   Pulse: 88   Resp: 16   Temp: 98.2  F (36.8  C)       General: Alert, not acutely distressed   Head:  Atraumatic, normocephalic  Ears:  TMs normal pearly-gray  Eyes:  PERRL, extraocular movements are intact  Nose:  Septum midline  Throat:  Oral mucosa moist, oropharynx clear  Neck:  Supple without adenopathy or thyromegaly   Cardiovascular: S1-S2 with regular rate and without murmur, rub, or gallop   Lungs:  Clear to auscultation bilaterally   Chest: There is a palpable lump at the junction of the left clavicle and sternum  There is no overlying erythema  Abdomen:  Soft, non tender, nondistended  Extremities: Without cyanosis or edema   Neuro:  CN II-XII appear intact        Laboratory Results:    Results for orders placed or performed in visit on 05/08/17   Thyroid Cascade   Result Value Ref Range    TSH 1.71 0.30 - 5.00 uIU/mL   HM2(CBC w/o Differential)   Result Value Ref Range    WBC 5.0 4.0 - 11.0 thou/uL    RBC 4.76 3.80 - 5.40 mill/uL    Hemoglobin 14.5 12.0 - 16.0 g/dL    Hematocrit 44.2 35.0 - 47.0 %    MCV 93 80 - 100 fL    MCH 30.5 27.0 - 34.0 pg    MCHC 32.9 32.0 - 36.0 g/dL    RDW 13.1 11.0 - 14.5 %    Platelets 187 140 - 440 thou/uL    MPV 7.2 7.0 - 10.0 fL   Basic Metabolic Panel   Result Value Ref Range    Sodium 140 136 - 145 mmol/L    Potassium 4.9 3.5 - 5.0 mmol/L    Chloride 102 98 - 107 mmol/L    CO2 25 22 - 31 mmol/L    Anion Gap, Calculation 13 5 - 18 mmol/L    Glucose 73 70 - 125 mg/dL    Calcium 9.6 8.5 - 10.5 mg/dL    BUN 23 8 - 28 mg/dL    Creatinine 1.05 0.60 - 1.10 mg/dL    GFR MDRD Af Amer >60 >60 mL/min/1.73m2    GFR MDRD Non Af Amer 50 (L) >60 mL/min/1.73m2         ASSESSMENT AND PLAN:    1. Lump in chest    Consider possible osteoarthritic changes or other arthritic changes    Reviewed previous chest x-ray as well as CT scan of the  chest  She has been treated previously with prednisone  She will be referred to rheumatology for further evaluation given that this area seems to be enlarged and there was possible synovitis mentioned on the CT scan  - Ambulatory referral to Rheumatology    2. Hypertension    Continue to monitor blood pressure  No treatment is required currently  - Basic Metabolic Panel    3. Hypothyroidism    Continue levothyroxine  - Thyroid Cascade    4. Fatigue  May be multifactorial    Check for anemia  Check a thyroid cascade as well as basic metabolic panel  - HM2(CBC w/o Differential)  Consider further evaluation as appropriate    25 minutes were spent with the patient and greater than 50% of the time was spent in face to face counseling and coordination of care            Edin Dailey MD      This note has been dictated and transcribed using voice recognition software.  Any grammatical or contextual distortions are unintentional and inherent to the software.

## 2021-06-11 NOTE — PROGRESS NOTES
SUBJECTIVE:    This is an 83-year-old female who presents to the clinic in follow-up for a left upper chest wall lump.  There has been a prominence at the junction of the left clavicle and sternum. She did have x-rays of the left clavicle that do not show any obvious mass at the time. She was referred for a CT scan of the chest and this was reviewed with radiology. It was felt that she likely has osteoarthritis there but may have synovitis as well. There was some fat stranding in the left axillary area.  Over time this area has enlarged.  She can feel the area when she swallows.  She was treated with prednisone in case there was synovitis.  Also, she has had ongoing fatigue of uncertain etiology.  She does note that the lump seems to be getting bigger.  She is able to swallow but does have an ongoing sense of discomfort in the left throat area.  She has not had significant pain.      Patient Active Problem List   Diagnosis     Hypothyroidism     Hyperlipidemia     Iron Deficiency     Anxiety     Hypertension     Chronic Kidney Disease, Stage 3     Lumbar Spondylosis     Lower Back Pain     Osteoporosis     Hyponatremia     Fatigue     Abdominal Pain       Current Outpatient Prescriptions on File Prior to Visit   Medication Sig     levothyroxine (SYNTHROID, LEVOTHROID) 50 MCG tablet Take 1 tablet (50 mcg total) by mouth daily.     sertraline (ZOLOFT) 100 MG tablet TAKE 1 TABLET (100 MG TOTAL) BY MOUTH DAILY.     traZODone (DESYREL) 50 MG tablet TAKE 1-2 TABLETS BY MOUTH EVERY NIGHT AT BEDTIME AS NEEDED .     No current facility-administered medications on file prior to visit.        Allergies   Allergen Reactions     Seafood             A 12 point comprehensive review of systems was negative except as noted.      OBJECTIVE:     Vitals:    06/08/17 1453   BP: 124/80   Pulse: 84   Resp: 16   Temp: 98.2  F (36.8  C)       General: Alert, not acutely distressed   Head:  Atraumatic, normocephalic  Eyes:  PERRL,  extraocular movements are intact  Nose:  Septum midline  Throat:  Oral mucosa moist, oropharynx clear  Neck:  Supple without adenopathy or thyromegaly   Cardiovascular: S1-S2 with regular rate and without murmur, rub, or gallop   Lungs:  Clear to auscultation bilaterally   Chest: Examination of the left sternoclavicular junction reveals a firm prominent mass  There is no overlying erythema  This is mildly tender to palpation  Extremities: Without cyanosis or edema   Neuro:  CN II-XII appear intact        Laboratory Results:    Results for orders placed or performed in visit on 05/08/17   Thyroid Cascade   Result Value Ref Range    TSH 1.71 0.30 - 5.00 uIU/mL   HM2(CBC w/o Differential)   Result Value Ref Range    WBC 5.0 4.0 - 11.0 thou/uL    RBC 4.76 3.80 - 5.40 mill/uL    Hemoglobin 14.5 12.0 - 16.0 g/dL    Hematocrit 44.2 35.0 - 47.0 %    MCV 93 80 - 100 fL    MCH 30.5 27.0 - 34.0 pg    MCHC 32.9 32.0 - 36.0 g/dL    RDW 13.1 11.0 - 14.5 %    Platelets 187 140 - 440 thou/uL    MPV 7.2 7.0 - 10.0 fL   Basic Metabolic Panel   Result Value Ref Range    Sodium 140 136 - 145 mmol/L    Potassium 4.9 3.5 - 5.0 mmol/L    Chloride 102 98 - 107 mmol/L    CO2 25 22 - 31 mmol/L    Anion Gap, Calculation 13 5 - 18 mmol/L    Glucose 73 70 - 125 mg/dL    Calcium 9.6 8.5 - 10.5 mg/dL    BUN 23 8 - 28 mg/dL    Creatinine 1.05 0.60 - 1.10 mg/dL    GFR MDRD Af Amer >60 >60 mL/min/1.73m2    GFR MDRD Non Af Amer 50 (L) >60 mL/min/1.73m2     Radiology:    XR Clavicle Left (Order [id number])   Imaging   Date: 6/8/2017 Department: [facility] Released By: RT Helga (R) Authorizing: Edin Dailey MD   Study Result   XR CLAVICLE LEFT  6/8/2017 3:34 PM     INDICATION: Localized swelling, mass and lump, unspecified  COMPARISON: 8/1/2016.     FINDINGS: No significant change. Degenerative and mild hypertrophic change of the left AC joint. Subtle mineralization about the AC joint. The acromion appears irregular in contour,  though not significantly changed; question prior injury. No acute   fracture or dislocation.     This report was electronically interpreted by: Dr. Eduardo Briones DO ON 06/08/2017 at 21:59         ASSESSMENT AND PLAN:    1. Lump  2. Lump in chest at the sternoclavicular joint    X-rays of the left clavicle were done and reviewed by me personally will be reviewed by radiology as well      - XR Clavicle Left; Future    We will review options.  She has been referred to rheumatology given that there was some synovitis  Prednisone helped only minimally  In the area enlarges this could contribute to swallowing difficulties.  If developing symptoms would consider an esophagram.  Consider also a surgical opinion    At this point the next step is to refer her to rheumatology      25 minutes were spent with the patient and greater than 50% of the time was spent in face to face counseling and coordination of care    Edin Dailey MD      This note has been dictated and transcribed using voice recognition software.  Any grammatical or contextual distortions are unintentional and inherent to the software.

## 2021-06-12 NOTE — PROGRESS NOTES
ASSESSMENT AND PLAN:  Remedios Vincent 83 y.o. female is seen here on 08/11/17 for evaluation of painful joints especially left knee where she has ample evidence of osteoarthritis.  She is a also here for concerns around her left sternoclavicular joint bony hypertrophy.  These require further workup.  A CT scan of the sternum and the sternoclavicular joints is being arranged.  With regards to the left knee symptoms she recalls having a good response to corticosteroid injections at orthopedics and would like to have that done.  This is done with 40 mg of Kenalog aseptic technique anterolateral approach.  She tolerated the procedure well.  I have asked her to return for follow-up.  In the next 4-6 weeks.  Diagnoses and all orders for this visit:    Primary osteoarthritis of both knees  -     triamcinolone acetonide 40 mg/mL injection 40 mg (KENALOG-40); Inject 1 mL (40 mg total) into the joint once.    Sternal mass  -     CT Chest Without Contrast; Future; Expected date: 8/11/17    Pain of right sternoclavicular joint  -     CT Chest Without Contrast; Future; Expected date: 8/11/17      HISTORY OF PRESENTING ILLNESS:  Remedios Vincent, 83 y.o., female is here for evaluation of joint pains.  She has been noticed swelling which she feels is quite firm of the left sternoclavicular joint.  She is also noted swelling in the sternum further inferiorly.  However in these areas do not trouble her in terms of any pain or stiffness.  Her worst pain is in the left knee.  This is moderately severe to severe.  This is gone on for several years.  In the past she reports that in orthopedic she has had good response with local injection steroids.  She reports pain both on ambulation and at rest.  She uses a walker otherwise she is unable to trust the leg as she puts it as it has given way underneath her.  She reports no history of psoriasis, ankylosing spondylitis herself or the family.  She is a non-smoker.  She lives by herself her  " passed away of coronary artery disease.  She has taken acetaminophen.  This does not help her pain.  She has multiple comorbidities as noted and reviewed.  Further historical information and ADL limitations as noted in the multidimensional health assessment questionnaire attached in the EMR. Rest of the 13 system ROS is negative.     ALLERGIES:Seafood    PAST MEDICAL/ACTIVE PROBLEMS/MEDICATION/ FAMILY HISTORY/SOCIAL DATA:  The patient has a family history of  Past Medical History:   Diagnosis Date     Anxiety      CKD (chronic kidney disease), stage III      Depression      HLD (hyperlipidemia)      HTN (hypertension)      Hypothyroidism      Lumbar spondylosis      Osteoporosis      History   Smoking Status     Never Smoker   Smokeless Tobacco     Not on file     Patient Active Problem List   Diagnosis     Hypothyroidism     Hyperlipidemia     Iron Deficiency     Anxiety     Hypertension     Chronic Kidney Disease, Stage 3     Lumbar Spondylosis     Lower Back Pain     Osteoporosis     Hyponatremia     Fatigue     Abdominal Pain     Primary osteoarthritis of both knees     Current Outpatient Prescriptions   Medication Sig Dispense Refill     levothyroxine (SYNTHROID, LEVOTHROID) 50 MCG tablet Take 1 tablet (50 mcg total) by mouth daily. 90 tablet 3     sertraline (ZOLOFT) 100 MG tablet TAKE 1 TABLET (100 MG TOTAL) BY MOUTH DAILY. 90 tablet 3     traZODone (DESYREL) 50 MG tablet TAKE 1-2 TABLETS BY MOUTH EVERY NIGHT AT BEDTIME AS NEEDED . 180 tablet 2     Current Facility-Administered Medications   Medication Dose Route Frequency Provider Last Rate Last Dose     triamcinolone acetonide 40 mg/mL injection 40 mg (KENALOG-40)  40 mg Intra-articular Once CLAUDY Lopez           COMPREHENSIVE EXAMINATION:  Vitals:    08/11/17 1012   BP: 128/84   Weight: 152 lb 12.8 oz (69.3 kg)   Height: 5' 4\" (1.626 m)     A well appearing alert oriented female. Vital data as noted above. Her eyes without " inflammation/scleromalacia. ENTwithout oral mucositis, thrush, nasal deformity, external ear redness, deformity. Her neck is without lymphadenopathy and supple. Lungs normal sounds, no pleural rub. Heart auscultation normal rate, rhythm; no pericardial rub and murmurs. Abdomen soft, non tender, no organomegaly. Skin examined for heliotrope, malar area eruption, lupus pernio, periungual erythema, sclerodactyly, papules, erythema nodosum, purpura, nail pitting, onycholysis, and obvious psoriasis lesion. Neurological examination shows normal alertness, speech, facial symmetry, tone and power in upper and lower extremities, Tinel's and Phalen's at wrist and gait. The joint examination is performed for swelling, tenderness, warmth, erythema, and range of motion in the following joints: DIPs, PIPs, MCPs, wrists, first CMC's, elbows, shoulders, hips, knees, ankles, feet; spine for range of motion and paraspinal muscles for tenderness. The salient normal / abnormal findings are appended.  She has bony hypertrophy of the left sternoclavicular joint.  She also has a bony hypertrophy in the form swelling in the mid sternum area.  Both of these are minimally tender if at all.  She is exquisitely tender and warm in her left knee joint line.  She has reduced range of motion there with loss of flexion.  She does not have synovitis in any of the palpable appendicular joints.  She does not have dactylitis enthesitis nail pitting.    LAB / IMAGING DATA:  Creatinine   Date Value Ref Range Status   05/08/2017 1.05 0.60 - 1.10 mg/dL Final   05/09/2016 1.01 0.60 - 1.10 mg/dL Final   10/12/2015 0.99 0.60 - 1.10 mg/dL Final       WBC   Date Value Ref Range Status   05/08/2017 5.0 4.0 - 11.0 thou/uL Final   09/16/2016 5.4 4.0 - 11.0 thou/uL Final   08/03/2015 8.2 4.0 - 11.0 thou/uL Final   11/13/2014 5.2 4.0 - 11.0 thou/uL Final     Hemoglobin   Date Value Ref Range Status   05/08/2017 14.5 12.0 - 16.0 g/dL Final   09/16/2016 14.1 12.0 -  16.0 g/dL Final   05/09/2016 14.5 12.0 - 16.0 g/dL Final     Platelets   Date Value Ref Range Status   05/08/2017 187 140 - 440 thou/uL Final   09/16/2016 176 140 - 440 thou/uL Final   05/09/2016 186 140 - 440 thou/uL Final       No results found for: ZACHARIAH, RF, SEDRATE

## 2021-06-14 NOTE — PROGRESS NOTES
"ASSESSMENT AND PLAN:  Remedios Vincent 83 y.o. female is seen here on 11/09/17 for follow-up of osteoarthritis, increasingly prominent left sternoclavicular joint.  She is an excellent response to corticosteroid injection the left knee.  She is aware of the management principles.  CT scan of the left sternoclavicular area to return to reassuring likely the prominence of the clavicle is related to the scoliosis.  She will return for follow-up in the next 3-4 months.      Diagnoses and all orders for this visit:    Primary osteoarthritis of both knees    HISTORY OF PRESENTING ILLNESS:  Remedios Vincent, 83 y.o., female is here for follow-up of his osteoarthritis.  She has done very well after the left knee injection.  It occasionally still \"bites\".  She noted overall pain level at 0.  She is able to do most of her day-to-day activities.  She noted mild discomfort in that knee now.  She had been concerned about the increasing prominence of the left clavicle, sternoclavicular area, CT scan was done and this is reassuring likely related with the scoliosis that the report is referred to.   She uses a walker otherwise she is unable to trust the leg as she puts it as it has given way underneath her.  She reports no history of psoriasis, ankylosing spondylitis herself or the family.  She is a non-smoker.  She lives by herself her  passed away of coronary artery disease.  She has taken acetaminophen.  This does not help her pain.  She has multiple comorbidities as noted and reviewed.  Further historical information and ADL limitations as noted in the multidimensional health assessment questionnaire attached in the EMR.      ALLERGIES:Seafood    PAST MEDICAL/ACTIVE PROBLEMS/MEDICATION/ FAMILY HISTORY/SOCIAL DATA:  The patient has a family history of  Past Medical History:   Diagnosis Date     Anxiety      CKD (chronic kidney disease), stage III      Depression      HLD (hyperlipidemia)      HTN (hypertension)      " "Hypothyroidism      Lumbar spondylosis      Osteoporosis      History   Smoking Status     Never Smoker   Smokeless Tobacco     Not on file     Patient Active Problem List   Diagnosis     Hypothyroidism     Hyperlipidemia     Iron Deficiency     Anxiety     Hypertension     Chronic Kidney Disease, Stage 3     Lumbar Spondylosis     Lower Back Pain     Osteoporosis     Hyponatremia     Fatigue     Abdominal Pain     Primary osteoarthritis of both knees     Sternal mass     Pain of right sternoclavicular joint     Current Outpatient Prescriptions   Medication Sig Dispense Refill     levothyroxine (SYNTHROID, LEVOTHROID) 50 MCG tablet TAKE 1 TABLET (50 MCG TOTAL) BY MOUTH DAILY. 90 tablet 2     sertraline (ZOLOFT) 100 MG tablet TAKE 1 TABLET (100 MG TOTAL) BY MOUTH DAILY. 90 tablet 3     traZODone (DESYREL) 50 MG tablet TAKE 1-2 TABLETS BY MOUTH EVERY NIGHT AT BEDTIME AS NEEDED . 180 tablet 2     No current facility-administered medications for this visit.      DETAILED EXAMINATION  11/09/17  :  Vitals:    11/09/17 1106   BP: 138/80   Weight: 152 lb 12.8 oz (69.3 kg)   Height: 5' 4\" (1.626 m)     Alert oriented. Head including the face is examined for malar rash, heliotropes, scarring, lupus pernio. Eyes examined for redness such as in episcleritis/scleritis, periorbital lesions.   Neck examined  for range of motion Both upper and lower extremities (all four) examined for swollen, warm &/or  tender joints, range of motion, rash, muscle weakness, edema. The salient normal / abnormal findings are appended.  No appreciable synovitis in any of the palpable appendicular joints.  Joint line tenderness, bilaterally, knees.  She does not have dactylitis enthesitis nail pitting.    LAB / IMAGING DATA:  Creatinine   Date Value Ref Range Status   05/08/2017 1.05 0.60 - 1.10 mg/dL Final   05/09/2016 1.01 0.60 - 1.10 mg/dL Final   10/12/2015 0.99 0.60 - 1.10 mg/dL Final       WBC   Date Value Ref Range Status   05/08/2017 5.0 4.0 - " 11.0 thou/uL Final   09/16/2016 5.4 4.0 - 11.0 thou/uL Final   08/03/2015 8.2 4.0 - 11.0 thou/uL Final   11/13/2014 5.2 4.0 - 11.0 thou/uL Final     Hemoglobin   Date Value Ref Range Status   05/08/2017 14.5 12.0 - 16.0 g/dL Final   09/16/2016 14.1 12.0 - 16.0 g/dL Final   05/09/2016 14.5 12.0 - 16.0 g/dL Final     Platelets   Date Value Ref Range Status   05/08/2017 187 140 - 440 thou/uL Final   09/16/2016 176 140 - 440 thou/uL Final   05/09/2016 186 140 - 440 thou/uL Final       No results found for: ZACHARIAH, RF, SEDRATE

## 2021-06-14 NOTE — PROGRESS NOTES
Assessment/ Plan     1. Fatigue  May be multifactorial    Recommend checking laboratory testing as noted    - Basic Metabolic Panel  - HM2(CBC w/o Differential)  - Thyroid Cascade  - Vitamin D, Total (25-Hydroxy)  - Vitamin B12  - Ferritin    Reviewed [facility] notes a previous evaluation.  They did discuss considering iron infusions of her ferritin level drops  Recommend that she continue with a multivitamin  Supplement vitamin D as needed      2. Hypothyroidism    Check a thyroid cascade  Continue levothyroxine    3.  This is localized to the left sternoclavicular joint.    Probable arthritis  This may be aggravated by his scoliosis lump, lateral to left sternum    Reviewed recent CT scan findings  We will continue to monitor    4.  Anxiety, stable    Continue sertraline    Patient agrees to plan    Healthcare maintenance    Influenza pneumonia vaccines were given today    25 minutes were spent with the patient and greater than 50% of the time was spent in face to face counseling and coordination of care      Subjective:       Remedios Vincent is a 83 y.o. female who presents to the clinic in follow-up.  She recently has had an evaluation for a lump in the left sternoclavicular area.  At one point there was a question of synovitis.  This lump has enlarged over time though she does not have difficulty with swallowing.  She did follow-up with rheumatology and had a CT scan of the chest.  This may be related to positioning changes related to scoliosis.  She may have some osteoarthritis noted as well.    Her medical history is otherwise notable for hypothyroidism.  She has been taking levothyroxine and is due for a laboratory check.  She also reports that she has ongoing fatigue.  She has not had chest pain, shortness breath, palpitations, orthopnea, or paroxysmal nocturnal dyspnea.  At one point she had an evaluation by the [facility] and was assessed to have low ferritin levels.  We discussed possible iron  "infusions if that persisted.  She has had normal hemoglobin recently.  She continues take sertraline for anxiety which has been helpful.  She lives independently and is a .  She believes her mood is been stable.    The following portions of the patient's history were reviewed and updated as appropriate: allergies, current medications, past family history, past medical history, past social history, past surgical history and problem list.    Review of Systems   A 12 point comprehensive review of systems was negative except as noted.      Current Outpatient Prescriptions   Medication Sig Dispense Refill     levothyroxine (SYNTHROID, LEVOTHROID) 50 MCG tablet TAKE 1 TABLET (50 MCG TOTAL) BY MOUTH DAILY. 90 tablet 2     traZODone (DESYREL) 50 MG tablet TAKE 1-2 TABLETS BY MOUTH EVERY NIGHT AT BEDTIME AS NEEDED . 180 tablet 2     sertraline (ZOLOFT) 100 MG tablet TAKE 1 TABLET (100 MG TOTAL) BY MOUTH DAILY. 90 tablet 3     No current facility-administered medications for this visit.        Objective:      /84  Pulse 80  Temp 98.1  F (36.7  C) (Oral)   Resp 16  Ht 5' 4\" (1.626 m)  Wt 153 lb (69.4 kg)  BMI 26.26 kg/m2      General appearance: alert, appears stated age and cooperative  Head: Normocephalic, without obvious abnormality, atraumatic   Ears: normal TM's and external ear canals both ears  Nose: Nares normal. Septum midline. Mucosa normal. No drainage or sinus tenderness.  Throat: lips, mucosa, and tongue normal; teeth and gums normal  Neck: no adenopathy, supple, symmetrical, trachea midline and thyroid not enlarged, symmetric, no tenderness/mass/nodules  Chest: There is a prominence the left sternoclavicular joint with no overlying erythema  Lungs: clear to auscultation bilaterally  Heart: regular rate and rhythm, S1, S2 normal, no murmur, click, rub or gallop  Extremities: extremities normal, atraumatic, no cyanosis or edema  Skin: Skin color, texture, turgor normal. No rashes or " lesions  Lymph nodes: Cervical nodes normal.  Neurologic: Alert and oriented X 3. Normal coordination and gait         Recent Results (from the past 168 hour(s))   Basic Metabolic Panel   Result Value Ref Range    Sodium 139 136 - 145 mmol/L    Potassium 4.9 3.5 - 5.0 mmol/L    Chloride 104 98 - 107 mmol/L    CO2 24 22 - 31 mmol/L    Anion Gap, Calculation 11 5 - 18 mmol/L    Glucose 104 70 - 125 mg/dL    Calcium 9.0 8.5 - 10.5 mg/dL    BUN 27 8 - 28 mg/dL    Creatinine 0.91 0.60 - 1.10 mg/dL    GFR MDRD Af Amer >60 >60 mL/min/1.73m2    GFR MDRD Non Af Amer 59 (L) >60 mL/min/1.73m2   HM2(CBC w/o Differential)   Result Value Ref Range    WBC 5.9 4.0 - 11.0 thou/uL    RBC 4.70 3.80 - 5.40 mill/uL    Hemoglobin 14.2 12.0 - 16.0 g/dL    Hematocrit 43.1 35.0 - 47.0 %    MCV 92 80 - 100 fL    MCH 30.1 27.0 - 34.0 pg    MCHC 32.9 32.0 - 36.0 g/dL    RDW 12.8 11.0 - 14.5 %    Platelets 191 140 - 440 thou/uL    MPV 7.4 7.0 - 10.0 fL   Thyroid Cascade   Result Value Ref Range    TSH 3.74 0.30 - 5.00 uIU/mL   Vitamin D, Total (25-Hydroxy)   Result Value Ref Range    Vitamin D, Total (25-Hydroxy) 24.6 (L) 30.0 - 80.0 ng/mL   Vitamin B12   Result Value Ref Range    Vitamin B-12 368 213 - 816 pg/mL   Ferritin   Result Value Ref Range    Ferritin 16 10 - 130 ng/mL          This note has been dictated using voice recognition software. Any grammatical or context distortions are unintentional and inherent to the software

## 2021-06-16 PROBLEM — M17.0 PRIMARY OSTEOARTHRITIS OF BOTH KNEES: Status: ACTIVE | Noted: 2017-08-11

## 2021-06-16 PROBLEM — M89.8X8 STERNAL MASS: Status: ACTIVE | Noted: 2017-08-11

## 2021-06-16 PROBLEM — M25.512 PAIN OF LEFT STERNOCLAVICULAR JOINT: Status: ACTIVE | Noted: 2017-08-11

## 2021-06-16 NOTE — TELEPHONE ENCOUNTER
RN cannot approve Refill Request    RN can NOT refill this medication PCP messaged that patient is overdue for Office Visit. Last office visit: 11/4/2019 Edin Dailey MD Last Physical: Visit date not found Last MTM visit: Visit date not found Last visit same specialty: 1/21/2020 Laura Henderson DO.  Next visit within 3 mo: Visit date not found  Next physical within 3 mo: Visit date not found      Joann Woodson, [facility] Triage/Med Refill 4/3/2021    Requested Prescriptions   Pending Prescriptions Disp Refills     traZODone (DESYREL) 50 MG tablet [Pharmacy Med Name: TRAZODONE 50 MG TABLET] 180 tablet 2     Sig: TAKE 1-2 TABLETS BY MOUTH EVERY NIGHT AT BEDTIME AS NEEDED .       Tricyclics/Misc Antidepressant/Antianxiety Meds Refill Protocol Failed - 4/3/2021  9:43 AM        Failed - PCP or prescribing provider visit in last year     Last office visit with prescriber/PCP: 11/4/2019 Edin Dailey MD OR same dept: Visit date not found OR same specialty: 1/21/2020 Laura Henderson DO  Last physical: Visit date not found Last MTM visit: Visit date not found   Next visit within 3 mo: Visit date not found  Next physical within 3 mo: Visit date not found  Prescriber OR PCP: Edin Dailey MD  Last diagnosis associated with med order: 1. Anxiety  - traZODone (DESYREL) 50 MG tablet [Pharmacy Med Name: TRAZODONE 50 MG TABLET]; TAKE 1-2 TABLETS BY MOUTH EVERY NIGHT AT BEDTIME AS NEEDED .  Dispense: 180 tablet; Refill: 2    If protocol passes may refill for 12 months if within 3 months of last provider visit (or a total of 15 months).

## 2021-06-19 NOTE — LETTER
Letter by Edin Dailey MD at      Author: Edin Dailey MD Service: -- Author Type: --    Filed:  Encounter Date: 6/26/2019 Status: (Other)         Remedios Vincent  [address]  [facility] [address]             June 26, 2019         Dear Ms. Melisa,    Below are the results from your recent visit:    Resulted Orders   CT Chest With Contrast    Narrative    EXAM: CT CHEST W CONTRAST  LOCATION: [facility]  DATE/TIME: 6/18/2019 3:08 PM    INDICATION: Chest wall pain. Abnormal sternum mass, chest pain.  COMPARISON: 08/25/2017.  TECHNIQUE: Helical images were obtained through the chest during injection of IV contrast. Multiplanar reformats were obtained. Dose reduction techniques were used.  CONTRAST: Iohexol (Omni) 75mL.    FINDINGS:   LUNGS AND PLEURA: Mild bronchiectasis without significant change. Negative pleural spaces.    MEDIASTINUM: No adenopathy. Aberrant right subclavian artery. Coronary calcifications. Moderate sized hiatus hernia.    LIMITED UPPER ABDOMEN: Probably stable hepatic and splenic hypodensity, suboptimally characterized. Atherosclerosis.    MUSCULOSKELETAL: Bony demineralization and diffuse degenerative changes. Prominent asymmetric degenerative change of the sternoclavicular joints, worse on the left. Surrounding hypertrophic change and soft tissue thickening present. Findings are stable   to slightly more pronounced compared to prior. Bilateral second rib costosternal junction hypertrophic changes also seen, more prominent than other costosternal junctions. No sternal fracture or mass.      Impression    CONCLUSION:     1.  Redemonstrated prominent asymmetric left sternoclavicular predominant hypertrophic degenerative changes and surrounding thickening. Slight subluxation of the left sternoclavicular joint noted. Otherwise, no sternal mass or fracture.    2.  Diffuse bony demineralization and degenerative changes elsewhere.    3.  Mild  bronchiectasis without significant change in the lungs. Minimal scarring.    4.  Moderate hiatus hernia.           Mavis,    Here is your CT scan report which we discussed. This shows degenerative changes of your chest bone (sternum). You also have a hiatal hernia which likely contributes to reflux symptoms.There are no worrisome findings in evaluating the lump on your chest.    Please call with questions or contact us using "[employer]t.    Sincerely,        Electronically signed by Edin Dailey MD

## 2021-06-19 NOTE — LETTER
Letter by Edin Dailey MD at      Author: Edin Dailey MD Service: -- Author Type: --    Filed:  Encounter Date: 5/23/2019 Status: (Other)         Remedios Vincent  [address]  [facility] [address]             May 23, 2019        Dear Remedios Vincent :    Dr. Dailey was reviewing your chart and noticed that you are due for a blood pressure follow up.  Your last appointment addressing blood pressure was 142/88.    To prevent further delays in your care and medication refills please schedule a [facility], or contact the [facility] at [phone]   and we can assist you in scheduling.    If you have transferred care and are no longer a patient of Dr. Dailey's please contact our office so we can update your chart.      Sincerely,  Your care team at [facility]

## 2021-06-19 NOTE — LETTER
Letter by Edin Dailey MD at      Author: Edin Dailey MD Service: -- Author Type: --    Filed:  Encounter Date: 6/26/2019 Status: (Other)         Remedios Vincent  [address]  [facility] [address]             June 26, 2019         Dear Ms. Vincent,    Below are the results from your recent visit:    Resulted Orders   Basic Metabolic Panel   Result Value Ref Range    Sodium 139 136 - 145 mmol/L    Potassium 4.3 3.5 - 5.0 mmol/L    Chloride 103 98 - 107 mmol/L    CO2 26 22 - 31 mmol/L    Anion Gap, Calculation 10 5 - 18 mmol/L    Glucose 96 70 - 125 mg/dL    Calcium 9.4 8.5 - 10.5 mg/dL    BUN 20 8 - 28 mg/dL    Creatinine 0.94 0.60 - 1.10 mg/dL    GFR MDRD Af Amer >60 >60 mL/min/1.73m2    GFR MDRD Non Af Amer 57 (L) >60 mL/min/1.73m2    Narrative    Fasting Glucose reference range is 70-99 mg/dL per  American Diabetes Association (ADA) guidelines.   HM2(CBC w/o Differential)   Result Value Ref Range    WBC 4.7 4.0 - 11.0 thou/uL    RBC 4.44 3.80 - 5.40 mill/uL    Hemoglobin 13.4 12.0 - 16.0 g/dL    Hematocrit 39.9 35.0 - 47.0 %    MCV 90 80 - 100 fL    MCH 30.2 27.0 - 34.0 pg    MCHC 33.6 32.0 - 36.0 g/dL    RDW 13.0 11.0 - 14.5 %    Platelets 190 140 - 440 thou/uL    MPV 6.9 (L) 7.0 - 10.0 fL   Thyroid Cascade   Result Value Ref Range    TSH 1.47 0.30 - 5.00 uIU/mL   Ferritin   Result Value Ref Range    Ferritin 14 10 - 130 ng/mL       Remedios,    Here are your blood test which we discussed.  In general, they look good.  Your blood counts are normal and you are not anemic.  Your ferritin level is borderline low and I do encourage oral iron supplementation if you can tolerate it.  This can be rechecked in 3-4 months.  Your thyroid test is normal.  Your kidney tests are mildly abnormal but have improved.  These tests will be followed over time.    Please call with questions or contact us using [employer].    Sincerely,        Electronically signed by dEin Dailey MD

## 2021-06-20 NOTE — PROGRESS NOTES
Assessment/ Plan     1. Hypertension, inadequate control    Reviewed her recent blood pressure which is elevated  Given potential side effects of starting antihypertensive medication will monitor her blood pressure  Can consider an ACE inhibitor if her blood pressure remains elevated given her low GFR  - Basic Metabolic Panel    2. Hypothyroidism    Check a thyroid Cascade  Continue levothyroxine  - Thyroid Cascade    3. Fatigue  Consider secondary to iron deficiency given evaluation by the [facility]    Check laboratory testing as noted  Recommend adequate sleep and exercise  Recommend a multivitamin    - Hepatic Profile  - HM2(CBC w/o Differential)  - Ferritin  - Vitamin D, Total (25-Hydroxy)    Consider further evaluation.  If developing an iron deficiency anemia consider checking Hemoccult testing  Consider an echocardiogram as well    4. Hyperlipidemia    Check a lipid cascade    5.  Low back pain  Mechanical    She may apply heat to her back  She may use topical lidocaine patches as needed  Consider further evaluation if not improving    Health care maintenance    Reviewed healthcare directives  Recommend that she consider the Shingrix/shingles vaccine    Patient verbalized understanding and agrees to the above plan      Subjective:       Remedios Vincent is a 84 y.o. female who presents to the clinic in follow-up for multiple issues.  She has known history of hypertension, hyperlipidemia, hypothyroidism, and chronic kidney disease.  A primary concern has been ongoing fatigue.  She states that she feels tired all of the time.  In the remote past she did have an evaluation by the [facility] and did have an iron deficiency anemia at the time.  They did discuss possible iron infusions if her ferritin level drops low.  She has a history of hypothyroidism and takes levothyroxine.  Given a history of anxiety and depression symptoms she does take sertraline.  For sleep she will take trazodone.  She denies chest pain  or palpitations.  She has not had bowel changes.  She has not had a recent fever or experienced weight loss.  It should be noted that she did have an evaluation for a bony mass near the left side of her sternum at the AC joint.  This is secondary to advanced degenerative changes.  Finally, she has had intermittent low back discomfort as well.  This has been treated conservatively.    The following portions of the patient's history were reviewed and updated as appropriate: allergies, current medications, past family history, past medical history, past social history, past surgical history and problem list.    Review of Systems   A 12 point comprehensive review of systems was negative except as noted.      Current Outpatient Prescriptions   Medication Sig Dispense Refill     levothyroxine (SYNTHROID, LEVOTHROID) 50 MCG tablet Take 1 tablet (50 mcg total) by mouth daily. 90 tablet 2     sertraline (ZOLOFT) 100 MG tablet TAKE 1 TABLET (100 MG TOTAL) BY MOUTH DAILY. 90 tablet 3     traZODone (DESYREL) 50 MG tablet TAKE 1-2 TABLETS BY MOUTH EVERY NIGHT AT BEDTIME AS NEEDED . 180 tablet 2     No current facility-administered medications for this visit.        Objective:      /88  Pulse 88  Wt 151 lb 1.6 oz (68.5 kg)  BMI 25.94 kg/m2      General appearance: alert, appears stated age and cooperative  Head: Normocephalic, without obvious abnormality, atraumatic  Eyes: conjunctivae/corneas clear. PERRL, EOM's intact.   Ears: normal TM's and external ear canals both ears  Nose: Nares normal. Septum midline. Mucosa normal  Throat: lips, mucosa, and tongue normal; teeth and gums normal  Neck: no adenopathy, supple, symmetrical  Chest: There is a palpable lump at the AC joint  There is no surrounding erythema  Lungs: clear to auscultation bilaterally  Heart: regular rate and rhythm, S1, S2 normal, no murmur, click, rub or gallop  Extremities: extremities normal, atraumatic, no cyanosis or edema  Skin: Skin color,  texture, turgor normal. No rashes or lesions  Lymph nodes: Cervical nodes normal.  Neurologic: Alert and oriented X 3         No results found for this or any previous visit (from the past 168 hour(s)).       This note has been dictated using voice recognition software. Any grammatical or context distortions are unintentional and inherent to the software

## 2021-06-21 NOTE — LETTER
Letter by Edin Dailey MD at      Author: Edin Dailey MD Service: -- Author Type: --    Filed:  Encounter Date: 2021 Status: (Other)         Remedios Vincent  [address]  [facility] [address]      2021      Dear Remedios Vincent,   : 1934      This letter is in regards to the appointment that you had scheduled on 2021 at the [facility] with Dr. Dailey.     The [facility] strives to see all patients in a timely manner and we need your help to achieve this.  The above-mentioned appointment was missed and we do not have record of a cancellation by you.  Whenever possible, we request appointment cancellations at least 72 hours in advance.  This time allows us to offer the appointment to another patient in need.      If you feel you have received this letter in error, or if you need to reschedule this appointment, please call our office so that we may update our records.      Sincerely,    [facility]

## 2021-06-23 NOTE — TELEPHONE ENCOUNTER
Refill Approved    Rx renewed per Medication Renewal Policy. Medication was last renewed on 12/12/17 .    Steph Sampson, [facility] Triage/Med Refill 1/19/2019     Requested Prescriptions   Pending Prescriptions Disp Refills     sertraline (ZOLOFT) 100 MG tablet [Pharmacy Med Name: SERTRALINE  MG TABLET] 90 tablet 1     Sig: TAKE 1 TABLET (100 MG TOTAL) BY MOUTH DAILY.    SSRI Refill Protocol  Passed - 1/19/2019  1:05 PM       Passed - PCP or prescribing provider visit in last year    Last office visit with prescriber/PCP: 9/17/2018 Edin Dailey MD OR same dept: 9/17/2018 Edin Dailey MD OR same specialty: 9/17/2018 Edin Dailey MD  Last physical: Visit date not found Last MTM visit: Visit date not found   Next visit within 3 mo: Visit date not found  Next physical within 3 mo: Visit date not found  Prescriber OR PCP: Edin Dailey MD  Last diagnosis associated with med order: 1. Anxiety  - sertraline (ZOLOFT) 100 MG tablet [Pharmacy Med Name: SERTRALINE  MG TABLET]; TAKE 1 TABLET (100 MG TOTAL) BY MOUTH DAILY.  Dispense: 90 tablet; Refill: 1    If protocol passes may refill for 12 months if within 3 months of last provider visit (or a total of 15 months).

## 2021-07-03 NOTE — ADDENDUM NOTE
Addendum Note by Gypsy Cooney MD at 12/12/2017  7:51 PM     Author: Gypsy Cooney MD Service: -- Author Type: Physician    Filed: 12/12/2017  7:51 PM Encounter Date: 12/12/2017 Status: Signed    : Gypsy Cooney MD (Physician)    Addended by: GYPSY COONEY on: 12/12/2017 07:51 PM        Modules accepted: Orders

## 2021-07-13 ENCOUNTER — OFFICE VISIT (OUTPATIENT)
Dept: FAMILY MEDICINE | Facility: CLINIC | Age: 86
End: 2021-07-13
Payer: COMMERCIAL

## 2021-07-13 VITALS
WEIGHT: 159.6 LBS | SYSTOLIC BLOOD PRESSURE: 184 MMHG | HEART RATE: 93 BPM | RESPIRATION RATE: 16 BRPM | DIASTOLIC BLOOD PRESSURE: 105 MMHG | BODY MASS INDEX: 27.4 KG/M2

## 2021-07-13 DIAGNOSIS — R53.83 FATIGUE, UNSPECIFIED TYPE: ICD-10-CM

## 2021-07-13 DIAGNOSIS — I10 ESSENTIAL HYPERTENSION: Primary | ICD-10-CM

## 2021-07-13 DIAGNOSIS — E03.9 HYPOTHYROIDISM, UNSPECIFIED TYPE: ICD-10-CM

## 2021-07-13 DIAGNOSIS — R79.9 ABNORMAL FINDING OF BLOOD CHEMISTRY, UNSPECIFIED: ICD-10-CM

## 2021-07-13 DIAGNOSIS — M89.8X8 STERNAL MASS: ICD-10-CM

## 2021-07-13 DIAGNOSIS — N18.31 STAGE 3A CHRONIC KIDNEY DISEASE (H): ICD-10-CM

## 2021-07-13 LAB
ALBUMIN SERPL-MCNC: 3.7 G/DL (ref 3.5–5)
ALP SERPL-CCNC: 60 U/L (ref 45–120)
ALT SERPL W P-5'-P-CCNC: <9 U/L (ref 0–45)
ANION GAP SERPL CALCULATED.3IONS-SCNC: 12 MMOL/L (ref 5–18)
AST SERPL W P-5'-P-CCNC: 14 U/L (ref 0–40)
BILIRUB SERPL-MCNC: 0.2 MG/DL (ref 0–1)
BUN SERPL-MCNC: 25 MG/DL (ref 8–28)
CALCIUM SERPL-MCNC: 8.6 MG/DL (ref 8.5–10.5)
CHLORIDE BLD-SCNC: 104 MMOL/L (ref 98–107)
CO2 SERPL-SCNC: 23 MMOL/L (ref 22–31)
CREAT SERPL-MCNC: 0.96 MG/DL (ref 0.6–1.1)
ERYTHROCYTE [DISTWIDTH] IN BLOOD BY AUTOMATED COUNT: 15.6 % (ref 10–15)
FERRITIN SERPL-MCNC: 11 NG/ML (ref 10–130)
GFR SERPL CREATININE-BSD FRML MDRD: 53 ML/MIN/1.73M2
GLUCOSE BLD-MCNC: 97 MG/DL (ref 70–125)
HCT VFR BLD AUTO: 38.9 % (ref 35–47)
HGB BLD-MCNC: 12 G/DL (ref 11.7–15.7)
MCH RBC QN AUTO: 27.6 PG (ref 26.5–33)
MCHC RBC AUTO-ENTMCNC: 30.8 G/DL (ref 31.5–36.5)
MCV RBC AUTO: 90 FL (ref 78–100)
PLATELET # BLD AUTO: 211 10E3/UL (ref 150–450)
POTASSIUM BLD-SCNC: 4.4 MMOL/L (ref 3.5–5)
PROT SERPL-MCNC: 6.9 G/DL (ref 6–8)
RBC # BLD AUTO: 4.34 10E6/UL (ref 3.8–5.2)
SODIUM SERPL-SCNC: 139 MMOL/L (ref 136–145)
TSH SERPL DL<=0.005 MIU/L-ACNC: 5.06 UIU/ML (ref 0.3–5)
WBC # BLD AUTO: 5.8 10E3/UL (ref 4–11)

## 2021-07-13 PROCEDURE — 85027 COMPLETE CBC AUTOMATED: CPT | Performed by: FAMILY MEDICINE

## 2021-07-13 PROCEDURE — 36415 COLL VENOUS BLD VENIPUNCTURE: CPT | Performed by: FAMILY MEDICINE

## 2021-07-13 PROCEDURE — 84439 ASSAY OF FREE THYROXINE: CPT | Performed by: FAMILY MEDICINE

## 2021-07-13 PROCEDURE — 99214 OFFICE O/P EST MOD 30 MIN: CPT | Performed by: FAMILY MEDICINE

## 2021-07-13 PROCEDURE — 82728 ASSAY OF FERRITIN: CPT | Performed by: FAMILY MEDICINE

## 2021-07-13 PROCEDURE — 84443 ASSAY THYROID STIM HORMONE: CPT | Performed by: FAMILY MEDICINE

## 2021-07-13 PROCEDURE — 80053 COMPREHEN METABOLIC PANEL: CPT | Performed by: FAMILY MEDICINE

## 2021-07-13 RX ORDER — LISINOPRIL 10 MG/1
10 TABLET ORAL DAILY
Qty: 90 TABLET | Refills: 0 | Status: SHIPPED | OUTPATIENT
Start: 2021-07-13 | End: 2021-10-04

## 2021-07-13 NOTE — LETTER
July 19, 2021      Remedios Vincent  [address]  [facility] [address]        Dear Remedios,    Your recent results look generally good.    The kidney profile and blood sugar test are normal.    Your hemoglobin is normal.  Your ferritin or storage form of iron is on the lower side.  I do recommend you continue with your oral iron supplement.  This can be followed and in the future if you meet criteria for an iron infusion we can consider that.    Also, your thyroid test was borderline.  We will have you continue your current thyroid medication dose and we can recheck again in 3-6 months.  We can consider medication adjustment if still abnormal    It was good to see you!    Please let me know if you have questions.    Edin Dailey MD      Resulted Orders   Comprehensive metabolic panel   Result Value Ref Range    Sodium 139 136 - 145 mmol/L    Potassium 4.4 3.5 - 5.0 mmol/L    Chloride 104 98 - 107 mmol/L    Carbon Dioxide (CO2) 23 22 - 31 mmol/L    Anion Gap 12 5 - 18 mmol/L    Urea Nitrogen 25 8 - 28 mg/dL    Creatinine 0.96 0.60 - 1.10 mg/dL    Calcium 8.6 8.5 - 10.5 mg/dL    Glucose 97 70 - 125 mg/dL    Alkaline Phosphatase 60 45 - 120 U/L    AST 14 0 - 40 U/L    ALT <9 0 - 45 U/L    Protein Total 6.9 6.0 - 8.0 g/dL    Albumin 3.7 3.5 - 5.0 g/dL    Bilirubin Total 0.2 0.0 - 1.0 mg/dL    GFR Estimate 53 (L) >60 mL/min/1.73m2      Comment:      As of July 11, 2021, eGFR is calculated by the CKD-EPI creatinine equation, without race adjustment. eGFR can be influenced by muscle mass, exercise, and diet. The reported eGFR is an estimation only and is only applicable if the renal function is stable.   TSH with free T4 reflex   Result Value Ref Range    TSH 5.06 (H) 0.30 - 5.00 uIU/mL   CBC with platelets   Result Value Ref Range    WBC Count 5.8 4.0 - 11.0 10e3/uL    RBC Count 4.34 3.80 - 5.20 10e6/uL    Hemoglobin 12.0 11.7 - 15.7 g/dL    Hematocrit 38.9 35.0 - 47.0 %    MCV 90 78 - 100 fL    MCH  27.6 26.5 - 33.0 pg    MCHC 30.8 (L) 31.5 - 36.5 g/dL    RDW 15.6 (H) 10.0 - 15.0 %    Platelet Count 211 150 - 450 10e3/uL   Ferritin   Result Value Ref Range    Ferritin 11 10 - 130 ng/mL   T4 free   Result Value Ref Range    Free T4 0.92 0.70 - 1.80 ng/dL      Comment:      Performance of the Free T4 test has not been established with  specimens (<= 2 months of age).         If you have any questions or concerns, please call the clinic at the number listed above.       Sincerely,      Edin Dailey MD

## 2021-07-13 NOTE — PATIENT INSTRUCTIONS
You may take 1/2 or 1 of the trazodone as needed for sleep  Start lisinopril for blood pressure  You may follow-up to recheck your blood pressure in 3 weeks  Please let me know if you have a problem  It was great to see you

## 2021-07-14 LAB — T4 FREE SERPL-MCNC: 0.92 NG/DL (ref 0.7–1.8)

## 2021-07-19 NOTE — PROGRESS NOTES
Assessment/ Plan     1. Hypertension    Blood pressure significantly elevated today  Recommend starting lisinopril 10 mg daily  Reviewed potential side effects  Follow-up to recheck blood pressure    - Comprehensive metabolic panel  - lisinopril (ZESTRIL) 10 MG tablet; Take 1 tablet (10 mg) by mouth daily  Dispense: 90 tablet; Refill: 0    2. Hypothyroidism, unspecified type    Check a thyroid [place]  Continue level thyroxine and adjust as needed  - TSH with free T4 reflex  - T4 free    3. Sternal mass  Left sternoclavicular joint    Likely secondary to osteoarthritis as noted on previous CT scan of the sternoclavicular joint  Continue to monitor  Monitor for dysphagia or difficulty breathing    4. Fatigue, unspecified type    She has a history of low ferritin levels  Check hemoglobin and ferritin  Recommend oral iron supplementation  Consider iron infusions if her ferritin level and hemoglobin levels drop  - CBC with platelets  - Ferritin; Future  - Ferritin    5. Abnormal finding of blood chemistry, unspecified     - Ferritin; Future  - Ferritin    6. Stage 3a chronic kidney disease    Recommend avoiding NSAIDs  Recommend improved control blood pressure as noted  Encourage plenty of liquids  Continue to monitor      Subjective:      Remedios Vincent is a 87 year old female who presents for a medication check.  She was last seen by this provider at the end of 2019.  She has a history of hypothyroidism and continues to take levothyroxine.  She was evaluated previously for anemia and low ferritin levels.  As noted, she did follow-up with the [facility] may discuss possible iron infusions if her ferritin level drops.  She does have fatigue.  Also, her blood pressures were elevated today.  She has not had a headache or visual disturbances.    Additionally, she does have a lump lateral to the sternum on the left side.  She did have CT scan chest and this showed a prominent neck secondary to osteoarthritis.  This has  not interfered with swallowing or with breathing.    Her mood has been stable.  She denies depression or anxiety.  Her appetite has been fine.    She is present with her granddaughter Jil.  She has been living dependently and is doing well.      The following portions of the patient's history were reviewed and updated as appropriate: allergies, current medications, past family history, past medical history, past social history, past surgical history and problem list. Medications have been reconciled    Review of Systems   A 12 point comprehensive review of systems was negative except as noted.      Current Outpatient Medications   Medication Sig Dispense Refill     lisinopril (ZESTRIL) 10 MG tablet Take 1 tablet (10 mg) by mouth daily 90 tablet 0     traZODone (DESYREL) 50 MG tablet [TRAZODONE (DESYREL) 50 MG TABLET] TAKE 1-2 TABLETS BY MOUTH EVERY NIGHT AT BEDTIME AS NEEDED . 180 tablet 2       Objective:     BP (!) 184/105 (BP Location: Left arm, Patient Position: Sitting, Cuff Size: Adult Regular)   Pulse 93   Resp 16   Wt 72.4 kg (159 lb 9.6 oz)   BMI 27.40 kg/m      General appearance: alert, appears stated age   Head: normocephalic, without obvious abnormality, atraumatic  Eyes: conjunctivae/corneas clear. PERRL, EOM's intact.   Neck: no adenopathy, supple, symmetrical, trachea midline and thyroid not enlarged, symmetric   Chest: There is a prominence at the sternoclavicular joint as noted on the left side  Lungs: clear to auscultation bilaterally  Heart: regular rate and rhythm, S1, S2 normal, no murmur, click, rub or gallop  Extremities: extremities normal, atraumatic, no cyanosis or edema  Skin: skin color, texture, turgor normal  Lymph nodes: Cervical nodes normal.  Neurologic: Alert and oriented X 3           Recent Results (from the past 168 hour(s))   Comprehensive metabolic panel   Result Value Ref Range    Sodium 139 136 - 145 mmol/L    Potassium 4.4 3.5 - 5.0 mmol/L    Chloride 104 98 - 107  mmol/L    Carbon Dioxide (CO2) 23 22 - 31 mmol/L    Anion Gap 12 5 - 18 mmol/L    Urea Nitrogen 25 8 - 28 mg/dL    Creatinine 0.96 0.60 - 1.10 mg/dL    Calcium 8.6 8.5 - 10.5 mg/dL    Glucose 97 70 - 125 mg/dL    Alkaline Phosphatase 60 45 - 120 U/L    AST 14 0 - 40 U/L    ALT <9 0 - 45 U/L    Protein Total 6.9 6.0 - 8.0 g/dL    Albumin 3.7 3.5 - 5.0 g/dL    Bilirubin Total 0.2 0.0 - 1.0 mg/dL    GFR Estimate 53 (L) >60 mL/min/1.73m2   TSH with free T4 reflex   Result Value Ref Range    TSH 5.06 (H) 0.30 - 5.00 uIU/mL   CBC with platelets   Result Value Ref Range    WBC Count 5.8 4.0 - 11.0 10e3/uL    RBC Count 4.34 3.80 - 5.20 10e6/uL    Hemoglobin 12.0 11.7 - 15.7 g/dL    Hematocrit 38.9 35.0 - 47.0 %    MCV 90 78 - 100 fL    MCH 27.6 26.5 - 33.0 pg    MCHC 30.8 (L) 31.5 - 36.5 g/dL    RDW 15.6 (H) 10.0 - 15.0 %    Platelet Count 211 150 - 450 10e3/uL   Ferritin   Result Value Ref Range    Ferritin 11 10 - 130 ng/mL   T4 free   Result Value Ref Range    Free T4 0.92 0.70 - 1.80 ng/dL          This note has been dictated using voice recognition software. Any grammatical or context distortions are unintentional and inherent to the software    Edin Dailey MD

## 2021-07-31 ENCOUNTER — HEALTH MAINTENANCE LETTER (OUTPATIENT)
Age: 86
End: 2021-07-31

## 2021-09-25 ENCOUNTER — HEALTH MAINTENANCE LETTER (OUTPATIENT)
Age: 86
End: 2021-09-25

## 2021-10-12 ENCOUNTER — TELEPHONE (OUTPATIENT)
Dept: FAMILY MEDICINE | Facility: CLINIC | Age: 86
End: 2021-10-12
Payer: COMMERCIAL

## 2021-10-12 DIAGNOSIS — M17.0 PRIMARY OSTEOARTHRITIS OF BOTH KNEES: Primary | ICD-10-CM

## 2021-10-12 NOTE — TELEPHONE ENCOUNTER
Order or referral being requested: 4 wheel walker w/ brakes  Reason for request: Osteoarthritis of both knees  Date needed: ASAP    Phone number for [facility] given to patient to call. Order pending for PCP review.

## 2021-10-22 NOTE — TELEPHONE ENCOUNTER
Refill Approved    Rx renewed per Medication Renewal Policy. Medication was last renewed on 6/13/18.    Kita Mitchell, [facility] Triage/Med Refill 3/26/2019     Requested Prescriptions   Pending Prescriptions Disp Refills     levothyroxine (SYNTHROID, LEVOTHROID) 50 MCG tablet [Pharmacy Med Name: LEVOTHYROXINE 50 MCG TABLET] 90 tablet 2     Sig: TAKE 1 TABLET (50 MCG TOTAL) BY MOUTH DAILY.    Thyroid Hormones Protocol Passed - 3/26/2019 12:15 PM       Passed - Provider visit in past 12 months or next 3 months    Last office visit with prescriber/PCP: 9/17/2018 Edin Dailey MD OR same dept: 9/17/2018 Edin Dailey MD OR same specialty: 9/17/2018 Edin Dailey MD  Last physical: Visit date not found Last MTM visit: Visit date not found   Next visit within 3 mo: Visit date not found  Next physical within 3 mo: Visit date not found  Prescriber OR PCP: Edin Dailey MD  Last diagnosis associated with med order: 1. Hypothyroid  - levothyroxine (SYNTHROID, LEVOTHROID) 50 MCG tablet [Pharmacy Med Name: LEVOTHYROXINE 50 MCG TABLET]; Take 1 tablet (50 mcg total) by mouth daily.  Dispense: 90 tablet; Refill: 2    If protocol passes may refill for 12 months if within 3 months of last provider visit (or a total of 15 months).            Passed - TSH on file in past 12 months for patient age 12 & older    TSH   Date Value Ref Range Status   09/17/2018 1.63 0.30 - 5.00 uIU/mL Final                                yes

## 2022-01-12 ENCOUNTER — MYC MEDICAL ADVICE (OUTPATIENT)
Dept: FAMILY MEDICINE | Facility: CLINIC | Age: 87
End: 2022-01-12
Payer: COMMERCIAL

## 2022-01-13 ENCOUNTER — APPOINTMENT (OUTPATIENT)
Dept: CT IMAGING | Facility: HOSPITAL | Age: 87
DRG: 066 | End: 2022-01-13
Attending: EMERGENCY MEDICINE
Payer: COMMERCIAL

## 2022-01-13 ENCOUNTER — TELEPHONE (OUTPATIENT)
Dept: NURSING | Facility: CLINIC | Age: 87
End: 2022-01-13
Payer: COMMERCIAL

## 2022-01-13 ENCOUNTER — APPOINTMENT (OUTPATIENT)
Dept: RADIOLOGY | Facility: HOSPITAL | Age: 87
DRG: 066 | End: 2022-01-13
Attending: EMERGENCY MEDICINE
Payer: COMMERCIAL

## 2022-01-13 ENCOUNTER — NURSE TRIAGE (OUTPATIENT)
Dept: NURSING | Facility: CLINIC | Age: 87
End: 2022-01-13
Payer: COMMERCIAL

## 2022-01-13 ENCOUNTER — APPOINTMENT (OUTPATIENT)
Dept: MRI IMAGING | Facility: HOSPITAL | Age: 87
DRG: 066 | End: 2022-01-13
Attending: EMERGENCY MEDICINE
Payer: COMMERCIAL

## 2022-01-13 ENCOUNTER — HOSPITAL ENCOUNTER (INPATIENT)
Facility: HOSPITAL | Age: 87
LOS: 2 days | Discharge: HOME HEALTH | DRG: 066 | End: 2022-01-15
Attending: EMERGENCY MEDICINE | Admitting: EMERGENCY MEDICINE
Payer: COMMERCIAL

## 2022-01-13 DIAGNOSIS — I63.9 ISCHEMIC STROKE (H): ICD-10-CM

## 2022-01-13 DIAGNOSIS — I10 HYPERTENSION, UNSPECIFIED TYPE: ICD-10-CM

## 2022-01-13 DIAGNOSIS — F39 MOOD DISORDER (H): ICD-10-CM

## 2022-01-13 DIAGNOSIS — F41.9 ANXIETY: ICD-10-CM

## 2022-01-13 DIAGNOSIS — Z91.148 NONCOMPLIANCE WITH MEDICATION REGIMEN: ICD-10-CM

## 2022-01-13 DIAGNOSIS — I10 ESSENTIAL HYPERTENSION: ICD-10-CM

## 2022-01-13 LAB
ALBUMIN SERPL-MCNC: 3.7 G/DL (ref 3.5–5)
ALBUMIN UR-MCNC: NEGATIVE MG/DL
ALP SERPL-CCNC: 56 U/L (ref 45–120)
ALT SERPL W P-5'-P-CCNC: <9 U/L (ref 0–45)
ANION GAP SERPL CALCULATED.3IONS-SCNC: 10 MMOL/L (ref 5–18)
APPEARANCE UR: CLEAR
AST SERPL W P-5'-P-CCNC: 15 U/L (ref 0–40)
ATRIAL RATE - MUSE: 84 BPM
BACTERIA #/AREA URNS HPF: ABNORMAL /HPF
BILIRUB SERPL-MCNC: 0.4 MG/DL (ref 0–1)
BILIRUB UR QL STRIP: NEGATIVE
BUN SERPL-MCNC: 20 MG/DL (ref 8–28)
C REACTIVE PROTEIN LHE: 0.4 MG/DL (ref 0–0.8)
CALCIUM SERPL-MCNC: 9.2 MG/DL (ref 8.5–10.5)
CHLORIDE BLD-SCNC: 105 MMOL/L (ref 98–107)
CHOLEST SERPL-MCNC: 241 MG/DL
CO2 SERPL-SCNC: 25 MMOL/L (ref 22–31)
COLOR UR AUTO: COLORLESS
CREAT SERPL-MCNC: 1.09 MG/DL (ref 0.6–1.1)
DIASTOLIC BLOOD PRESSURE - MUSE: 117 MMHG
ERYTHROCYTE [DISTWIDTH] IN BLOOD BY AUTOMATED COUNT: 13.8 % (ref 10–15)
GFR SERPL CREATININE-BSD FRML MDRD: 49 ML/MIN/1.73M2
GLUCOSE BLD-MCNC: 119 MG/DL (ref 70–125)
GLUCOSE BLDC GLUCOMTR-MCNC: 77 MG/DL (ref 70–99)
GLUCOSE UR STRIP-MCNC: NEGATIVE MG/DL
HBA1C MFR BLD: 5.6 %
HCT VFR BLD AUTO: 42.6 % (ref 35–47)
HDLC SERPL-MCNC: 74 MG/DL
HGB BLD-MCNC: 13.5 G/DL (ref 11.7–15.7)
HGB UR QL STRIP: NEGATIVE
HOLD SPECIMEN: NORMAL
INR PPP: 1.02 (ref 0.9–1.15)
INTERPRETATION ECG - MUSE: NORMAL
KETONES UR STRIP-MCNC: NEGATIVE MG/DL
LDLC SERPL CALC-MCNC: 149 MG/DL
LEUKOCYTE ESTERASE UR QL STRIP: ABNORMAL
MCH RBC QN AUTO: 29.7 PG (ref 26.5–33)
MCHC RBC AUTO-ENTMCNC: 31.7 G/DL (ref 31.5–36.5)
MCV RBC AUTO: 94 FL (ref 78–100)
NITRATE UR QL: POSITIVE
P AXIS - MUSE: 41 DEGREES
PH UR STRIP: 7 [PH] (ref 5–7)
PLATELET # BLD AUTO: 212 10E3/UL (ref 150–450)
POTASSIUM BLD-SCNC: 3.9 MMOL/L (ref 3.5–5)
PR INTERVAL - MUSE: 152 MS
PROT SERPL-MCNC: 7 G/DL (ref 6–8)
QRS DURATION - MUSE: 66 MS
QT - MUSE: 382 MS
QTC - MUSE: 451 MS
R AXIS - MUSE: -10 DEGREES
RBC # BLD AUTO: 4.54 10E6/UL (ref 3.8–5.2)
RBC URINE: 1 /HPF
SODIUM SERPL-SCNC: 140 MMOL/L (ref 136–145)
SP GR UR STRIP: 1.04 (ref 1–1.03)
SQUAMOUS EPITHELIAL: <1 /HPF
SYSTOLIC BLOOD PRESSURE - MUSE: 225 MMHG
T AXIS - MUSE: -4 DEGREES
TRANSITIONAL EPI: <1 /HPF
TRIGL SERPL-MCNC: 91 MG/DL
TSH SERPL DL<=0.005 MIU/L-ACNC: 4.34 UIU/ML (ref 0.3–5)
UROBILINOGEN UR STRIP-MCNC: <2 MG/DL
VENTRICULAR RATE- MUSE: 84 BPM
WBC # BLD AUTO: 4.9 10E3/UL (ref 4–11)
WBC URINE: 9 /HPF

## 2022-01-13 PROCEDURE — A9585 GADOBUTROL INJECTION: HCPCS | Performed by: EMERGENCY MEDICINE

## 2022-01-13 PROCEDURE — 250N000013 HC RX MED GY IP 250 OP 250 PS 637: Performed by: EMERGENCY MEDICINE

## 2022-01-13 PROCEDURE — 70496 CT ANGIOGRAPHY HEAD: CPT

## 2022-01-13 PROCEDURE — 250N000009 HC RX 250: Performed by: EMERGENCY MEDICINE

## 2022-01-13 PROCEDURE — 85610 PROTHROMBIN TIME: CPT | Performed by: EMERGENCY MEDICINE

## 2022-01-13 PROCEDURE — 255N000002 HC RX 255 OP 636: Performed by: EMERGENCY MEDICINE

## 2022-01-13 PROCEDURE — 85027 COMPLETE CBC AUTOMATED: CPT | Performed by: EMERGENCY MEDICINE

## 2022-01-13 PROCEDURE — 250N000011 HC RX IP 250 OP 636: Performed by: EMERGENCY MEDICINE

## 2022-01-13 PROCEDURE — 120N000001 HC R&B MED SURG/OB

## 2022-01-13 PROCEDURE — 99285 EMERGENCY DEPT VISIT HI MDM: CPT | Mod: 25

## 2022-01-13 PROCEDURE — 87086 URINE CULTURE/COLONY COUNT: CPT | Performed by: STUDENT IN AN ORGANIZED HEALTH CARE EDUCATION/TRAINING PROGRAM

## 2022-01-13 PROCEDURE — 87635 SARS-COV-2 COVID-19 AMP PRB: CPT | Performed by: EMERGENCY MEDICINE

## 2022-01-13 PROCEDURE — 99207 PR CDG-CORRECTLY CODED, REVIEWED AND AGREE: CPT | Performed by: EMERGENCY MEDICINE

## 2022-01-13 PROCEDURE — 96375 TX/PRO/DX INJ NEW DRUG ADDON: CPT

## 2022-01-13 PROCEDURE — 81003 URINALYSIS AUTO W/O SCOPE: CPT | Performed by: STUDENT IN AN ORGANIZED HEALTH CARE EDUCATION/TRAINING PROGRAM

## 2022-01-13 PROCEDURE — 99223 1ST HOSP IP/OBS HIGH 75: CPT | Mod: AI | Performed by: EMERGENCY MEDICINE

## 2022-01-13 PROCEDURE — 83036 HEMOGLOBIN GLYCOSYLATED A1C: CPT | Performed by: EMERGENCY MEDICINE

## 2022-01-13 PROCEDURE — 71045 X-RAY EXAM CHEST 1 VIEW: CPT

## 2022-01-13 PROCEDURE — 80061 LIPID PANEL: CPT | Performed by: EMERGENCY MEDICINE

## 2022-01-13 PROCEDURE — 96374 THER/PROPH/DIAG INJ IV PUSH: CPT | Mod: 59

## 2022-01-13 PROCEDURE — 93005 ELECTROCARDIOGRAM TRACING: CPT | Performed by: EMERGENCY MEDICINE

## 2022-01-13 PROCEDURE — 80053 COMPREHEN METABOLIC PANEL: CPT | Performed by: EMERGENCY MEDICINE

## 2022-01-13 PROCEDURE — 84443 ASSAY THYROID STIM HORMONE: CPT | Performed by: EMERGENCY MEDICINE

## 2022-01-13 PROCEDURE — 86140 C-REACTIVE PROTEIN: CPT | Performed by: EMERGENCY MEDICINE

## 2022-01-13 PROCEDURE — 96372 THER/PROPH/DIAG INJ SC/IM: CPT

## 2022-01-13 PROCEDURE — C9803 HOPD COVID-19 SPEC COLLECT: HCPCS

## 2022-01-13 PROCEDURE — 70498 CT ANGIOGRAPHY NECK: CPT

## 2022-01-13 PROCEDURE — 96376 TX/PRO/DX INJ SAME DRUG ADON: CPT

## 2022-01-13 PROCEDURE — 36415 COLL VENOUS BLD VENIPUNCTURE: CPT | Performed by: EMERGENCY MEDICINE

## 2022-01-13 PROCEDURE — 70553 MRI BRAIN STEM W/O & W/DYE: CPT

## 2022-01-13 PROCEDURE — 82040 ASSAY OF SERUM ALBUMIN: CPT | Performed by: EMERGENCY MEDICINE

## 2022-01-13 RX ORDER — METOPROLOL TARTRATE 1 MG/ML
5 INJECTION, SOLUTION INTRAVENOUS ONCE
Status: COMPLETED | OUTPATIENT
Start: 2022-01-13 | End: 2022-01-13

## 2022-01-13 RX ORDER — ASPIRIN 81 MG/1
81 TABLET ORAL DAILY
Status: DISCONTINUED | OUTPATIENT
Start: 2022-01-13 | End: 2022-01-15 | Stop reason: HOSPADM

## 2022-01-13 RX ORDER — METOPROLOL TARTRATE 25 MG/1
50 TABLET, FILM COATED ORAL ONCE
Status: COMPLETED | OUTPATIENT
Start: 2022-01-13 | End: 2022-01-13

## 2022-01-13 RX ORDER — ONDANSETRON 2 MG/ML
4 INJECTION INTRAMUSCULAR; INTRAVENOUS EVERY 6 HOURS PRN
Status: DISCONTINUED | OUTPATIENT
Start: 2022-01-13 | End: 2022-01-15 | Stop reason: HOSPADM

## 2022-01-13 RX ORDER — ONDANSETRON 4 MG/1
4 TABLET, ORALLY DISINTEGRATING ORAL EVERY 6 HOURS PRN
Status: DISCONTINUED | OUTPATIENT
Start: 2022-01-13 | End: 2022-01-15 | Stop reason: HOSPADM

## 2022-01-13 RX ORDER — ASPIRIN 81 MG/1
81 TABLET, CHEWABLE ORAL DAILY
Status: DISCONTINUED | OUTPATIENT
Start: 2022-01-13 | End: 2022-01-15 | Stop reason: HOSPADM

## 2022-01-13 RX ORDER — IOPAMIDOL 755 MG/ML
75 INJECTION, SOLUTION INTRAVASCULAR ONCE
Status: COMPLETED | OUTPATIENT
Start: 2022-01-13 | End: 2022-01-13

## 2022-01-13 RX ORDER — ACETAMINOPHEN 325 MG/1
650 TABLET ORAL EVERY 4 HOURS PRN
Status: DISCONTINUED | OUTPATIENT
Start: 2022-01-13 | End: 2022-01-15 | Stop reason: HOSPADM

## 2022-01-13 RX ORDER — LISINOPRIL 5 MG/1
10 TABLET ORAL DAILY
Status: DISCONTINUED | OUTPATIENT
Start: 2022-01-14 | End: 2022-01-15 | Stop reason: HOSPADM

## 2022-01-13 RX ORDER — LABETALOL HYDROCHLORIDE 5 MG/ML
10-20 INJECTION, SOLUTION INTRAVENOUS EVERY 10 MIN PRN
Status: DISCONTINUED | OUTPATIENT
Start: 2022-01-13 | End: 2022-01-15 | Stop reason: HOSPADM

## 2022-01-13 RX ORDER — HYDRALAZINE HYDROCHLORIDE 20 MG/ML
10-20 INJECTION INTRAMUSCULAR; INTRAVENOUS
Status: DISCONTINUED | OUTPATIENT
Start: 2022-01-13 | End: 2022-01-15 | Stop reason: HOSPADM

## 2022-01-13 RX ORDER — LORAZEPAM 2 MG/ML
0.5 INJECTION INTRAMUSCULAR ONCE
Status: COMPLETED | OUTPATIENT
Start: 2022-01-13 | End: 2022-01-13

## 2022-01-13 RX ORDER — ATORVASTATIN CALCIUM 40 MG/1
40 TABLET, FILM COATED ORAL EVERY EVENING
Status: DISCONTINUED | OUTPATIENT
Start: 2022-01-13 | End: 2022-01-15 | Stop reason: HOSPADM

## 2022-01-13 RX ORDER — ASPIRIN 325 MG
325 TABLET ORAL ONCE
Status: COMPLETED | OUTPATIENT
Start: 2022-01-13 | End: 2022-01-13

## 2022-01-13 RX ORDER — GADOBUTROL 604.72 MG/ML
8 INJECTION INTRAVENOUS ONCE
Status: COMPLETED | OUTPATIENT
Start: 2022-01-13 | End: 2022-01-13

## 2022-01-13 RX ORDER — TRAZODONE HYDROCHLORIDE 50 MG/1
50 TABLET, FILM COATED ORAL
Status: DISCONTINUED | OUTPATIENT
Start: 2022-01-13 | End: 2022-01-15 | Stop reason: HOSPADM

## 2022-01-13 RX ORDER — LIDOCAINE 40 MG/G
CREAM TOPICAL
Status: DISCONTINUED | OUTPATIENT
Start: 2022-01-13 | End: 2022-01-15 | Stop reason: HOSPADM

## 2022-01-13 RX ADMIN — ENOXAPARIN SODIUM 40 MG: 40 INJECTION SUBCUTANEOUS at 20:38

## 2022-01-13 RX ADMIN — IOPAMIDOL 75 ML: 755 INJECTION, SOLUTION INTRAVENOUS at 13:47

## 2022-01-13 RX ADMIN — ASPIRIN 325 MG: 325 TABLET ORAL at 18:12

## 2022-01-13 RX ADMIN — METOPROLOL TARTRATE 50 MG: 25 TABLET, FILM COATED ORAL at 18:35

## 2022-01-13 RX ADMIN — METOPROLOL TARTRATE 5 MG: 1 INJECTION, SOLUTION INTRAVENOUS at 11:56

## 2022-01-13 RX ADMIN — FAMOTIDINE 20 MG: 10 INJECTION, SOLUTION INTRAVENOUS at 20:36

## 2022-01-13 RX ADMIN — METOPROLOL TARTRATE 50 MG: 25 TABLET, FILM COATED ORAL at 16:04

## 2022-01-13 RX ADMIN — LORAZEPAM 0.5 MG: 2 INJECTION INTRAMUSCULAR; INTRAVENOUS at 16:54

## 2022-01-13 RX ADMIN — METOPROLOL TARTRATE 5 MG: 1 INJECTION, SOLUTION INTRAVENOUS at 16:06

## 2022-01-13 RX ADMIN — GADOBUTROL 8 ML: 604.72 INJECTION INTRAVENOUS at 17:17

## 2022-01-13 ASSESSMENT — ACTIVITIES OF DAILY LIVING (ADL)
ADLS_ACUITY_SCORE: 9
ADLS_ACUITY_SCORE: 12
ADLS_ACUITY_SCORE: 9

## 2022-01-13 ASSESSMENT — MIFFLIN-ST. JEOR: SCORE: 1252.35

## 2022-01-13 NOTE — ED TRIAGE NOTES
EMS reports pt coming from [facility] with c/o slurred speech onset yesterday.  EMS reports pt gets occasional visitors and yesterday her grandson visited her and felt that her speech was slurred and wanted pt to be evaluated but pt refused.  This am dtr visited and felt that speech was still slurred and insisted pt come in. Pt felt that she may have been feeling weak since yesterday and or possibly day before.

## 2022-01-13 NOTE — ED NOTES
MRI tech has called and reported that Mavis cannot complete MRI due to feeling claustrophobic inside. Mallorie nurse paged to take some anti-anxiety medication to MRI.

## 2022-01-13 NOTE — ED NOTES
Neuros remain intact, right facial droop remains, no drooling noted at this time.  Pt wondering if she is any closer to going home.  Informed pt not at the moment.

## 2022-01-13 NOTE — ED PROVIDER NOTES
ED SIGNOUT Note    CC:      Chief Complaint   Patient presents with     Aphasia        Sign-out received from Dr. Jiménez  HPI: Remedios Vincent is a 87 year old female seen in the emergency department and signed out to me at shift change. Please refer to the ED provider note.       Medical records reviewed     Physical Exam:  Vitals:    01/13/22 1300 01/13/22 1500 01/13/22 1600 01/13/22 1800   BP: (!) 215/98 (!) 232/131 (!) 197/91 (!) 226/105   Pulse: 66 79 72 60   Resp: 17 25 22    Temp:       SpO2: 95% 94% 93% 93%   Weight:       Height:         GENERAL APPEARANCE:   FACE: normal facies  EYES: PERRL, EOMI, sclera non-icteric  HENT: oral exam benign, mucus membranes intact   NECK: no adenopathy or asymmetry, thyroid normal to palpation  RESP: lungs are clear to auscultation - no rales, rhonchi or wheezes  CV: regular rates and rhythm, no murmurs, rubs, or gallop  ABD: soft, no tenderness; no rebound or guarding; bowel sounds are normal  MS: no gross deformities noted; normal muscle tone.  EXT: No calf tenderness or pitting edema  SKIN: no worrisome rash  NEURO: slight right lower facial droop; no focal deficits, speech is normal    Labs/Imaging:  Results for orders placed or performed during the hospital encounter of 01/13/22 (from the past 24 hour(s))   ECG 12-LEAD WITH MUSE (LHE)   Result Value Ref Range    Systolic Blood Pressure 225 mmHg    Diastolic Blood Pressure 117 mmHg    Ventricular Rate 84 BPM    Atrial Rate 84 BPM    CT Interval 152 ms    QRS Duration 66 ms     ms    QTc 451 ms    P Axis 41 degrees    R AXIS -10 degrees    T Axis -4 degrees    Interpretation ECG       Sinus rhythm  Normal ECG  When compared with ECG of 12-MAR-2008 22:03,  Nonspecific T wave abnormality, worse in Anterior leads  Confirmed by SEE ED PROVIDER NOTE FOR, ECG INTERPRETATION ([id number]),  ELINA ESPINOZA ([id number]) on 1/13/2022 11:47:23 AM     INR   Result Value Ref Range    INR 1.02 0.90 - 1.15   Comprehensive metabolic  panel   Result Value Ref Range    Sodium 140 136 - 145 mmol/L    Potassium 3.9 3.5 - 5.0 mmol/L    Chloride 105 98 - 107 mmol/L    Carbon Dioxide (CO2) 25 22 - 31 mmol/L    Anion Gap 10 5 - 18 mmol/L    Urea Nitrogen 20 8 - 28 mg/dL    Creatinine 1.09 0.60 - 1.10 mg/dL    Calcium 9.2 8.5 - 10.5 mg/dL    Glucose 119 70 - 125 mg/dL    Alkaline Phosphatase 56 45 - 120 U/L    AST 15 0 - 40 U/L    ALT <9 0 - 45 U/L    Protein Total 7.0 6.0 - 8.0 g/dL    Albumin 3.7 3.5 - 5.0 g/dL    Bilirubin Total 0.4 0.0 - 1.0 mg/dL    GFR Estimate 49 (L) >60 mL/min/1.73m2   CRP inflammation   Result Value Ref Range    CRP 0.4 0.0-<0.8 mg/dL   CBC (+ platelets, no diff)   Result Value Ref Range    WBC Count 4.9 4.0 - 11.0 10e3/uL    RBC Count 4.54 3.80 - 5.20 10e6/uL    Hemoglobin 13.5 11.7 - 15.7 g/dL    Hematocrit 42.6 35.0 - 47.0 %    MCV 94 78 - 100 fL    MCH 29.7 26.5 - 33.0 pg    MCHC 31.7 31.5 - 36.5 g/dL    RDW 13.8 10.0 - 15.0 %    Platelet Count 212 150 - 450 10e3/uL   Laie Draw    Narrative    The following orders were created for panel order Laie Draw.  Procedure                               Abnormality         Status                     ---------                               -----------         ------                     Extra Red Top Tube[[id number]]                               Final result                 Please view results for these tests on the individual orders.   Extra Red Top Tube   Result Value Ref Range    Hold Specimen JIC    XR Chest Port 1 View    Narrative    EXAM: XR CHEST PORT 1 VIEW  LOCATION: [facility]  DATE/TIME: 1/13/2022 12:04 PM    INDICATION: Hypertension, question of slurred speech.  COMPARISON: 06/18/2019      Impression    IMPRESSION: Shallow inspiration. Mild elevation right hemidiaphragm. No focal pulmonary consolidation or visible pleural fluid. Heart size and pulmonary vascularity within normal limits. Thoracolumbar curvature. No significant change.   CTA  Head Neck with Contrast    Narrative    HEAD AND NECK CT ANGIOGRAM WITH IV CONTRAST  1/13/2022 1:36 PM    INDICATION: Stroke/TIA, assess extracranial arteries. Aphasia, slurred speech. Right facial droop.  TECHNIQUE: Head and neck CT angiogram with IV contrast. Noncontrast head CT followed by axial helical CT images of the head and neck vessels obtained during the arterial phase of intravenous contrast administration. Axial helical 2D reconstructed images   and multiplanar 3D MIP reconstructed images of the head and neck vessels were performed by the technologist. Dose reduction techniques were used.  CONTRAST: Isovue 370, 75 mL.  COMPARISON: Carotid ultrasound 10/14/2016, brain MRI 7/6/2016.     FINDINGS:  NONCONTRAST HEAD CT: No intracranial hemorrhage, extraaxial collection, mass effect or CT evidence of acute infarct.  Mild presumed chronic small vessel ischemic changes. Chronic appearing lacunar infarct in the posterior left putamen is new versus 2016.   Mild generalized volume loss. The ventricles are proportional to the sulci. Osseous structures are intact. The visualized orbits, paranasal sinuses and mastoid air cells are free of significant disease.     HEAD CTA: No proximal large vessel occlusion. Carotid siphon atherosclerosis without narrowing. Severe stenosis of the distal A2 segment of the left anterior cerebral artery. More mild to moderate multifocal narrowing in the more distal branches of the   anterior cerebral arteries. Severe focal stenosis of the mid posterior division M2 branch of the right middle cerebral artery. More mild atherosclerosis distal to this. Mild atherosclerosis in the mid M2 segment of the left middle cerebral artery with   moderate to severe narrowing in the distal posterior division M2 branches. Vertebral and basilar arteries are patent. Prominent posterior communicating arteries are the dominant supply to both posterior cerebral arteries which demonstrate multifocal   areas  of moderate to severe atherosclerotic narrowing, more pronounced on the right than on the left. The dural venous sinuses are not well visualized due to the arterial timing of the contrast bolus.    NECK CTA: Retroesophageal right subclavian artery.  Carotid arteries are patent with mild atherosclerotic change.  No hemodynamically significant stenosis by NASCET criteria in either carotid system.  Patent vertebral arteries. No dissection.      Impression    CONCLUSION:  HEAD CT:  1.  No intracranial hemorrhage, mass, or definite CT evidence of recent ischemia.  2.  Chronic appearing lacunar infarct in the posterior left putamen is new versus 2016.  3.  Mild atrophy and presumed chronic small vessel ischemic changes.    HEAD CTA:  1.  Multifocal intracranial atherosclerosis most pronounced involving the left anterior, right middle and posterior cerebral arteries.  2.  No proximal large vessel occlusion.  3.  No aneurysm.    NECK CTA:  1.  Mild carotid artery atherosclerosis. No dissection or hemodynamically significant narrowing in the neck by NASCET criteria.       MR Brain w/o & w Contrast    Narrative    EXAM: MR BRAIN W/O and W CONTRAST  LOCATION: [facility]  DATE/TIME: 1/13/2022 4:31 PM    INDICATION: Right facial droop and speech issues stroke follow-up.  COMPARISON: 1/13/2022 and 7/6/2006 18.  CONTRAST: 8ml Gadavist  TECHNIQUE: MRI brain without and with contrast.    FINDINGS: On the diffusion-weighted images there is ischemia noted within the posterior left putamen.  There is no other focus of ischemia or restricted diffusion. There is no discrete mass lesion or midline shift. There is no acute extra-axial fluid   collection or acute intraparenchymal hemorrhage. On the susceptibility weighted images there are several foci of dark signal within the posterior fossa which may represent microangiopathy. There are appropriate flow voids within the cavernous portions of   the internal carotid  arteries and the basilar artery. The area of ischemia on the diffusion-weighted images is visualized on the FLAIR and T2-weighted images. Following the administration of contrast no abnormal enhancement is noted. This area has   ischemia is consistent  with being subacute in nature. On the FLAIR and T2-weighted images there are multiple foci of high signal within the periventricular and subcortical white matter consistent with diffuse small vessel ischemic disease. There are   multiple prominent perivascular spaces involving the basal ganglia bilaterally and old lacunar infarcts. The ventricular system, basal cisterns and the cortical sulci are consistent with diffuse volume loss.    There is no evidence of cerebellar tonsillar ectopia. The corpus callosum and the sella region have appropriate configuration and signal intensity for the patient's age. The orbit regions are unremarkable. There is no significant paranasal sinus disease.   The mastoid air cells and middle ear regions are clear bilaterally.      Impression    IMPRESSION:  1. Subacute ischemia posterior putamen.  2. No evidence of a mass lesion, midline shift or acute hemorrhage.  3.  No abnormal enhancement.  4.  Diffuse age related changes.    These findings were communicated by phone to Dr. Rocha at 5:45 AM on 01/13/2022.     IMPRESSION:   Final diagnoses:   Ischemic stroke (H)   Noncompliance with medication regimen   Hypertension, unspecified type       ED COURSE/MEDICAL DECISION MAKING  3:35 PM I introduced myself to the patient and performed my assessment while waiting on MRI findings, will speak with neuro following results   ED Course as of 01/13/22 1824   Thu Jan 13, 2022   1426 Pt signed out to me pending MRI with 2 days intermittent slurred speech episodes, known HTN and elevated BP here but around baseline per family and given lopressor in ED and not taking meds at home for this, patient notes she feels speech is normal, slight right lower  facial droop unknown chronicity, CTA unremarkable except for small left thalamic lacunar possible infarct new compared to many years ago, pending MRI and plan to d/w neuro after MRI if anomaly apparent and reassess   1547 I spoke with Isabel (daughter) at [phone] and will further update upon MRI results, she is amenable to plan. Further metoprolol PO and IV adinistered for improved BP control.   1647 Pt given 0.5mg ativan at her request in MRI for feelings of anxiety   1748 I spoke with Dr Perez from radiology who notes left basal ganglia subacue infarct present on MRI, stroke neuro paged, BP now 197/91    1808 I spoke with stroke neuro who agrees to ASA and admit, hosptialsit paged for admission.   1816 I called daughter Isabel at [phone] and discussed MRI result, she notes she functions as family contact, one daughter noted to Isabel that patient is supposed to be on BP medication and isn't taking it because she had formerly declined. More metoprolol given for further BP control, they are amenable to plan to admit   1820 Daughter Isabel notes patient lives alone at home, they are concerned re: possibility of inability to care for self especially with recent medication noncompliance, I offered SW or care coordinator can speak with pt while here re: situation   1822 Pt admitted to hospitalist Dr Fierro                  I, Kana Torres, am serving as a scribe to document services personally performed by Dr. Cherelle Rocha based on my observation and the provider's statements to me. I, Cherelle Rocha MD attest that Kana Torres is acting in a scribe capacity, has observed my performance of the services and has documented them in accordance with my direction.     Cherelle Rocha MD  01/13/22 1824

## 2022-01-13 NOTE — TELEPHONE ENCOUNTER
Christin Larsen Joseph T, MD 24 minutes ago (9:02 AM)     KK    FYI    Message text      Christin Larsen  [facility] Nurse Triage Pool 24 minutes ago (9:02 AM)     KK       Can you please triage for stroke symptoms.         Documentation      Christin Larsen Mavis J 25 minutes ago (9:01 AM)     KK      Hi Jil,     If she is having slurred speech, drooling, facial droop on one side of her face, confusion, then you need to call 911 as these are symptoms of a stroke. I will have one of our triage nurses reach out as well but she needs to be evaluated immediately.       This [employer] message has not been read.     Barbra Camara routed conversation to [facility] Team Pool 2 hours ago (6:51 AM)     Remedios Vincent Joseph T, MD 13 hours ago (8:23 PM)     MM      Hi this is Jil...Maroseline Rodrigueztays granddaughter. She has started to slur her speech and drool out of the right side of her mouth. She says she woke up in the middle of the night a couple of nights ago and didn't feel right and the drooling had started. My mom and I are not sure what to do. She seems OK otherwise. She's just very tired all of the time. Could you please call my mom (her daughter) Clare Silverman at [phone]. She has power of  over grandma and is in charge of her affairs. Please don't call grandma as she has become very confused and may not know what the call is about.   Thank you,  Jil Vincent

## 2022-01-13 NOTE — TELEPHONE ENCOUNTER
"Telephone Call:     Daughter calling because she thinks patient had a stroke or bells palsy yesterday. She is not with the patient, so triage was not completed.     Pt does not have a hx of bells palsy    Tuesday when daughter called her, patient complained of a \"horrible back ache\". She has a hx of arthritis  When she talked to her she notice that her speech was slurred  Pt is walking OK  Caller has COVID, so she hasn't been able to see her but she sent her grandson there and he stated that patient's speech is slurred but she is walking ok.     Pt's daughter is going have someone bring patient to the ED today if patient is agreeable. Pt has been declining to be seen by provider.     Writer is routing a message to patient's PCP for further advisement for daughter as well.     Cherelle Craft RN  [facility] Nurse Advisor 9:06 AM 1/13/2022            "

## 2022-01-13 NOTE — ED NOTES
"Pt states she hasn't felt well since Tuesday, pt states she usually plays cards onTuesday but just didn't feel up to it.  Pt also states \"i'm still drooling a little bit from my right side\".  "

## 2022-01-13 NOTE — ED NOTES
Bed: JNED-04  Expected date: 1/13/22  Expected time:   Means of arrival: Ambulance  Comments:  WBL  Slurred speech  87 F

## 2022-01-14 ENCOUNTER — APPOINTMENT (OUTPATIENT)
Dept: PHYSICAL THERAPY | Facility: HOSPITAL | Age: 87
DRG: 066 | End: 2022-01-14
Attending: EMERGENCY MEDICINE
Payer: COMMERCIAL

## 2022-01-14 ENCOUNTER — APPOINTMENT (OUTPATIENT)
Dept: CARDIOLOGY | Facility: HOSPITAL | Age: 87
DRG: 066 | End: 2022-01-14
Attending: EMERGENCY MEDICINE
Payer: COMMERCIAL

## 2022-01-14 ENCOUNTER — APPOINTMENT (OUTPATIENT)
Dept: OCCUPATIONAL THERAPY | Facility: HOSPITAL | Age: 87
DRG: 066 | End: 2022-01-14
Attending: EMERGENCY MEDICINE
Payer: COMMERCIAL

## 2022-01-14 ENCOUNTER — APPOINTMENT (OUTPATIENT)
Dept: SPEECH THERAPY | Facility: HOSPITAL | Age: 87
DRG: 066 | End: 2022-01-14
Attending: EMERGENCY MEDICINE
Payer: COMMERCIAL

## 2022-01-14 LAB
ANION GAP SERPL CALCULATED.3IONS-SCNC: 10 MMOL/L (ref 5–18)
BUN SERPL-MCNC: 20 MG/DL (ref 8–28)
CALCIUM SERPL-MCNC: 9.3 MG/DL (ref 8.5–10.5)
CHLORIDE BLD-SCNC: 105 MMOL/L (ref 98–107)
CO2 SERPL-SCNC: 23 MMOL/L (ref 22–31)
CREAT SERPL-MCNC: 0.96 MG/DL (ref 0.6–1.1)
ERYTHROCYTE [DISTWIDTH] IN BLOOD BY AUTOMATED COUNT: 13.8 % (ref 10–15)
GFR SERPL CREATININE-BSD FRML MDRD: 57 ML/MIN/1.73M2
GLUCOSE BLD-MCNC: 82 MG/DL (ref 70–125)
GLUCOSE BLDC GLUCOMTR-MCNC: 83 MG/DL (ref 70–99)
HCT VFR BLD AUTO: 42.9 % (ref 35–47)
HGB BLD-MCNC: 13.7 G/DL (ref 11.7–15.7)
MCH RBC QN AUTO: 29.4 PG (ref 26.5–33)
MCHC RBC AUTO-ENTMCNC: 31.9 G/DL (ref 31.5–36.5)
MCV RBC AUTO: 92 FL (ref 78–100)
PLATELET # BLD AUTO: 233 10E3/UL (ref 150–450)
POTASSIUM BLD-SCNC: 4.2 MMOL/L (ref 3.5–5)
RBC # BLD AUTO: 4.66 10E6/UL (ref 3.8–5.2)
SARS-COV-2 RNA RESP QL NAA+PROBE: NEGATIVE
SODIUM SERPL-SCNC: 138 MMOL/L (ref 136–145)
WBC # BLD AUTO: 6.8 10E3/UL (ref 4–11)

## 2022-01-14 PROCEDURE — 97162 PT EVAL MOD COMPLEX 30 MIN: CPT | Mod: GP

## 2022-01-14 PROCEDURE — 250N000011 HC RX IP 250 OP 636: Performed by: EMERGENCY MEDICINE

## 2022-01-14 PROCEDURE — 92526 ORAL FUNCTION THERAPY: CPT | Mod: GN

## 2022-01-14 PROCEDURE — 85027 COMPLETE CBC AUTOMATED: CPT | Performed by: EMERGENCY MEDICINE

## 2022-01-14 PROCEDURE — 97535 SELF CARE MNGMENT TRAINING: CPT | Mod: GO

## 2022-01-14 PROCEDURE — 96375 TX/PRO/DX INJ NEW DRUG ADDON: CPT

## 2022-01-14 PROCEDURE — 99223 1ST HOSP IP/OBS HIGH 75: CPT | Performed by: INTERNAL MEDICINE

## 2022-01-14 PROCEDURE — 250N000009 HC RX 250: Performed by: EMERGENCY MEDICINE

## 2022-01-14 PROCEDURE — 97110 THERAPEUTIC EXERCISES: CPT | Mod: GP

## 2022-01-14 PROCEDURE — 97116 GAIT TRAINING THERAPY: CPT | Mod: GP

## 2022-01-14 PROCEDURE — 250N000013 HC RX MED GY IP 250 OP 250 PS 637: Performed by: EMERGENCY MEDICINE

## 2022-01-14 PROCEDURE — 93306 TTE W/DOPPLER COMPLETE: CPT

## 2022-01-14 PROCEDURE — 80048 BASIC METABOLIC PNL TOTAL CA: CPT | Performed by: EMERGENCY MEDICINE

## 2022-01-14 PROCEDURE — 96376 TX/PRO/DX INJ SAME DRUG ADON: CPT

## 2022-01-14 PROCEDURE — 120N000001 HC R&B MED SURG/OB

## 2022-01-14 PROCEDURE — 99207 PR CDG-MDM COMPONENT: MEETS MODERATE - DOWN CODED: CPT | Performed by: INTERNAL MEDICINE

## 2022-01-14 PROCEDURE — 97166 OT EVAL MOD COMPLEX 45 MIN: CPT | Mod: GO

## 2022-01-14 PROCEDURE — 36415 COLL VENOUS BLD VENIPUNCTURE: CPT | Performed by: EMERGENCY MEDICINE

## 2022-01-14 PROCEDURE — 96372 THER/PROPH/DIAG INJ SC/IM: CPT

## 2022-01-14 PROCEDURE — 99232 SBSQ HOSP IP/OBS MODERATE 35: CPT | Performed by: INTERNAL MEDICINE

## 2022-01-14 PROCEDURE — 99222 1ST HOSP IP/OBS MODERATE 55: CPT | Performed by: PSYCHIATRY & NEUROLOGY

## 2022-01-14 PROCEDURE — 93306 TTE W/DOPPLER COMPLETE: CPT | Mod: 26 | Performed by: INTERNAL MEDICINE

## 2022-01-14 PROCEDURE — 97530 THERAPEUTIC ACTIVITIES: CPT | Mod: GO

## 2022-01-14 PROCEDURE — 92610 EVALUATE SWALLOWING FUNCTION: CPT | Mod: GN

## 2022-01-14 PROCEDURE — 250N000013 HC RX MED GY IP 250 OP 250 PS 637: Performed by: INTERNAL MEDICINE

## 2022-01-14 PROCEDURE — 250N000013 HC RX MED GY IP 250 OP 250 PS 637: Performed by: PSYCHIATRY & NEUROLOGY

## 2022-01-14 PROCEDURE — 250N000011 HC RX IP 250 OP 636: Performed by: STUDENT IN AN ORGANIZED HEALTH CARE EDUCATION/TRAINING PROGRAM

## 2022-01-14 RX ORDER — METOPROLOL TARTRATE 25 MG/1
25 TABLET, FILM COATED ORAL 2 TIMES DAILY
Status: DISCONTINUED | OUTPATIENT
Start: 2022-01-14 | End: 2022-01-15 | Stop reason: HOSPADM

## 2022-01-14 RX ORDER — CLOPIDOGREL BISULFATE 75 MG/1
75 TABLET ORAL DAILY
Status: DISCONTINUED | OUTPATIENT
Start: 2022-01-15 | End: 2022-01-15 | Stop reason: HOSPADM

## 2022-01-14 RX ORDER — CLOPIDOGREL BISULFATE 75 MG/1
300 TABLET ORAL DAILY
Status: DISCONTINUED | OUTPATIENT
Start: 2022-01-14 | End: 2022-01-15 | Stop reason: CLARIF

## 2022-01-14 RX ADMIN — METOPROLOL TARTRATE 25 MG: 25 TABLET, FILM COATED ORAL at 20:05

## 2022-01-14 RX ADMIN — HYDRALAZINE HYDROCHLORIDE 10 MG: 20 INJECTION INTRAMUSCULAR; INTRAVENOUS at 02:42

## 2022-01-14 RX ADMIN — FAMOTIDINE 20 MG: 10 INJECTION, SOLUTION INTRAVENOUS at 08:53

## 2022-01-14 RX ADMIN — CLOPIDOGREL BISULFATE 300 MG: 75 TABLET ORAL at 16:01

## 2022-01-14 RX ADMIN — FAMOTIDINE 20 MG: 10 INJECTION, SOLUTION INTRAVENOUS at 20:05

## 2022-01-14 RX ADMIN — ATORVASTATIN CALCIUM 40 MG: 40 TABLET, FILM COATED ORAL at 20:08

## 2022-01-14 RX ADMIN — ENOXAPARIN SODIUM 40 MG: 40 INJECTION SUBCUTANEOUS at 20:07

## 2022-01-14 RX ADMIN — LABETALOL HYDROCHLORIDE 10 MG: 5 INJECTION, SOLUTION INTRAVENOUS at 21:06

## 2022-01-14 RX ADMIN — LABETALOL HYDROCHLORIDE 10 MG: 5 INJECTION, SOLUTION INTRAVENOUS at 12:42

## 2022-01-14 ASSESSMENT — ACTIVITIES OF DAILY LIVING (ADL)
ADLS_ACUITY_SCORE: 9
ADLS_ACUITY_SCORE: 8
ADLS_ACUITY_SCORE: 8
ADLS_ACUITY_SCORE: 9
ADLS_ACUITY_SCORE: 8
ADLS_ACUITY_SCORE: 9
PREVIOUS_RESPONSIBILITIES: MEAL PREP;LAUNDRY;MEDICATION MANAGEMENT;FINANCES;DRIVING
ADLS_ACUITY_SCORE: 8
ADLS_ACUITY_SCORE: 8
ADLS_ACUITY_SCORE: 9
ADLS_ACUITY_SCORE: 8
ADLS_ACUITY_SCORE: 8
ADLS_ACUITY_SCORE: 9
ADLS_ACUITY_SCORE: 8
ADLS_ACUITY_SCORE: 9
ADLS_ACUITY_SCORE: 8
ADLS_ACUITY_SCORE: 9

## 2022-01-14 NOTE — ED NOTES
"Boarding Note:    Alert and oriented.  Neuros and NIH SS: has right facial droop.  Speech is clear.  Has RLE drift.  FAILED SWALLOW SCREEN.    On RA.  No distress.  Sats are mid 90s.  HR 60s, NSR.  No CP.  NPO until speech eval.    Using BSC.  Worked with PT.  Had ECHO.    VS obtained: BP (!) 208/95   Pulse 79   Temp 98.6  F (37  C) (Oral)   Resp 30   Ht 1.651 m (5' 5\")   Wt 81.6 kg (180 lb)   SpO2 96%   BMI 29.95 kg/m  .  PRNs available for systolics >220.    Updated family members.    " Advancement Flap (Double) Text: The defect edges were debeveled with a #15 scalpel blade.  Given the location of the defect and the proximity to free margins a double advancement flap was deemed most appropriate.  Using a sterile surgical marker, the appropriate advancement flaps were drawn incorporating the defect and placing the expected incisions within the relaxed skin tension lines where possible.    The area thus outlined was incised deep to adipose tissue with a #15 scalpel blade.  The skin margins were undermined to an appropriate distance in all directions utilizing iris scissors.

## 2022-01-14 NOTE — ED NOTES
Assumed care of pt, pt still voicing desire to go home.  Pt continues to have drooling of right side.

## 2022-01-14 NOTE — PROGRESS NOTES
01/14/22 0850   Quick Adds   Type of Visit Initial PT Evaluation   Living Environment   People in home alone   Current Living Arrangements independent living facility  (condo)   Home Accessibility no concerns   Transportation Anticipated family or friend will provide   Self-Care   Equipment Currently Used at Home walker, rolling;other (see comments)  (4WW also)   Activity/Exercise/Self-Care Comment uses 4WW in community; has RW in condo and building hallways; independent ADL's/IADL's; granddtr assists with cleaning   General Information   Onset of Illness/Injury or Date of Surgery 01/13/22   Referring Physician Dr. Friedman   Patient/Family Therapy Goals Statement (PT) go home   Pertinent History of Current Problem (include personal factors and/or comorbidities that impact the POC) CVA subacute L post. putamen; slurred speech, R facial droop; PMH of HTN, hypothyroid, anxiety/depression   Existing Precautions/Restrictions fall   Cognition   Orientation Status (Cognition) oriented x 4   Affect/Mental Status (Cognition) WFL;other (see comments)  (pt reports short-term memory issues)   Follows Commands (Cognition) WFL   Range of Motion (ROM)   ROM Quick Adds ROM WFL   Strength   Manual Muscle Testing Quick Adds Strength WFL  (B hip flexor weakness 4/5 RLE; 4+/5 LLE; B ankle DF 5/5)   Bed Mobility   Bed Mobility sit-supine   Sit-Supine [place] (Bed Mobility) supervision;verbal cues   Transfers   Transfers sit-stand transfer   Sit-Stand Transfer   Sit-Stand [place] (Transfers) contact guard;verbal cues;supervision   Assistive Device (Sit-Stand Transfers) walker, front-wheeled   Gait/Stairs (Locomotion)   [place] Level (Gait) contact guard;minimum assist (75% patient effort);verbal cues   Assistive Device (Gait) walker, front-wheeled   Distance in Feet (Required for LE Total Joints) 70   Pattern (Gait) step-through   Deviations/Abnormal Patterns (Gait) weight shifting decreased;gait speed decreased;abimbola  decreased;other (see comments)  (decreased R heel strike/step length/foot clearance)   Clinical Impression   Criteria for Skilled Therapeutic Intervention yes, treatment indicated   PT Diagnosis (PT) impaired functional mobility   Influenced by the following impairments decreased strength, balance, endurance   Functional limitations due to impairments transfers, gait, bed mobility   Clinical Presentation Stable/Uncomplicated   Clinical Presentation Rationale Pt presents as medically diagnosed   Clinical Decision Making (Complexity) moderate complexity   Therapy Frequency (PT) Daily   Predicted Duration of Therapy Intervention (days/wks) 7 days   Planned Therapy Interventions (PT) balance training;bed mobility training;gait training;home exercise program;patient/family education;strengthening;transfer training   Anticipated Equipment Needs at Discharge (PT) walker, rolling  (pt has at home)   Risk & Benefits of therapy have been explained evaluation/treatment results reviewed;patient   PT Discharge Planning    PT Discharge Recommendation (DC Rec) Transitional Care Facility;home with assist;home with home care physical therapy   PT Rationale for DC Rec assist of 1 for transfers/amb with rw   Total Evaluation Time   Total Evaluation Time (Minutes) 10

## 2022-01-14 NOTE — H&P
ADMISSION HISTORY & PHYSICAL      Moriarity, Edin JAIMES, [phone]  ASSESSMENT AND PLAN:  87 year old female presenting with:    1.  CVA: MRI shows subacute ischemia posterior putamen.  Given aspirin, ischemic stroke set ordered, neurology consulted.  Lipid panel ordered, started on statin.  PT/OT, echocardiogram ordered.    2. hypertensive urgency: Blood pressures have been as high as 232/131.  Receiving IV beta-blocker in the ED with transient lowering.  She has not been compliant with her home blood pressure medications.  Ischemic stroke blood pressure control protocol ordered.  Last systolic pressure on file was 125, will hold her home lisinopril for now.    3. hypothyroidism: She has not been compliant with taking her thyroid medication, I have ordered a TSH, supposed to be on Synthroid 50 mcg daily but has apparently not taken it for weeks.  We will hold this for now while awaiting TSH.    4. history of anxiety disorder/depression: Supposed to be on Zoloft, she apparently has been noncompliant, will hold on this for right now as she has not taken this in weeks.     Barriers to discharge: Stroke, further work-up      CHIEF COMPLAINT:  Slurred speech    HISTORY OF PRESENTING ILLNESS:  Remedios Vincent is a 87 year old female brought in by EMS for slurred speech.  She feels that it started 4 days ago but it was first noticed yesterday by her family when her grandson visited her and her speech was slurred.  Her daughter then went to visit her today and noticed the slurred speech and called EMS.  When EMS arrived her blood pressure was 220/115 and was persistently in the 200s in the ED despite treatment.  She denies any other symptoms, she states that she did not realize that her speech was slurred.  She feels her speech is better but she still has clearly slight slurring and also has facial droop.  No past history of similar symptoms.  Her daughter has been concerned about her ability to live on her own prior to  this, especially in light of her noncompliance with her medications.    PMH/PSH:  Patient Active Problem List   Diagnosis     Hypothyroidism     Hyperlipidemia     Anxiety     Hypertension     Chronic kidney disease, stage III (moderate) (H)     Lumbar Spondylosis     Lower Back Pain     Osteoporosis     Fatigue     Primary osteoarthritis of both knees     Sternal mass     Pain of left sternoclavicular joint     Ischemic stroke (H)       ALLERGIES:  Allergies   Allergen Reactions     Seafood Unknown       MEDICATIONS:  Reviewed.  Current Facility-Administered Medications   Medication     acetaminophen (TYLENOL) tablet 650 mg     aspirin EC tablet 81 mg    Or     aspirin (ASA) chewable tablet 81 mg     atorvastatin (LIPITOR) tablet 40 mg     enoxaparin ANTICOAGULANT (LOVENOX) injection 40 mg     famotidine (PEPCID) injection 20 mg     labetalol (NORMODYNE/TRANDATE) injection 10-20 mg    Or     hydrALAZINE (APRESOLINE) injection 10-20 mg     lidocaine (LMX4) cream     lidocaine 1 % 0.1-1 mL     medication instruction - No oral meds if patient didn't pass dysphagia screen     Medication Instructions - Avoid dextrose in IV solutions.     ondansetron (ZOFRAN-ODT) ODT tab 4 mg    Or     ondansetron (ZOFRAN) injection 4 mg     sodium chloride (PF) 0.9% PF flush 3 mL     sodium chloride (PF) 0.9% PF flush 3 mL     Current Outpatient Medications   Medication     traZODone (DESYREL) 50 MG tablet     levothyroxine (SYNTHROID/LEVOTHROID) 50 MCG tablet     lisinopril (ZESTRIL) 10 MG tablet     sertraline (ZOLOFT) 100 MG tablet       SOCIAL HISTORY:  Social History     Socioeconomic History     Marital status:      Spouse name: Not on file     Number of children: Not on file     Years of education: Not on file     Highest education level: Not on file   Occupational History     Not on file   Tobacco Use     Smoking status: Never Smoker     Smokeless tobacco: Never Used   Substance and Sexual Activity     Alcohol use: No      "Drug use: Not on file     Sexual activity: Not on file   Other Topics Concern     Not on file   Social History Narrative     Not on file     Social Determinants of Health     Financial Resource Strain: Not on file   Food Insecurity: Not on file   Transportation Needs: Not on file   Physical Activity: Not on file   Stress: Not on file   Social Connections: Not on file   Intimate Partner Violence: Not on file   Housing Stability: Not on file       FAMILY HISTORY:  History reviewed. No pertinent family history.    ROS:  12 point ROS was performed and found to be negative except for the pertinent positives mentioned in the HPI.      PHYSICAL EXAM:  BP (!) 210/125   Pulse 63   Temp 98.3  F (36.8  C)   Resp (!) 31   Ht 1.651 m (5' 5\")   Wt 81.6 kg (180 lb)   SpO2 93%   BMI 29.95 kg/m    No intake/output data recorded.  No intake/output data recorded.  CONSTITUTIONAL: No apparent distress  HEENT:       Sclera- anicteric      Mucous membrane- moist and pink  LUNGS: Clear to auscultation bilaterally  CARDIOVASCULAR: S1S2 regular. No murmurs, rubs or gallops,no pedal edema  GI: Soft. Non-tender, non-distended. No organomegaly. No guarding or rigidity. Bowel sounds- active  NEURO: .  Right facial droop with slight slurring of speech.  Sensation intact bilaterally, no obvious motor abnormalities. No involuntary movements  INTGM: No s pallor no cyanosis or clubbing  LYMPH: n  MSK: no joint swelling or tenderness  PSYCH: alert and oriented to place, no agitation      DIAGNOSTIC DATA:  Recent Results (from the past 24 hour(s))   ECG 12-LEAD WITH MUSE (E)    Collection Time: 01/13/22 11:42 AM   Result Value Ref Range    Systolic Blood Pressure 225 mmHg    Diastolic Blood Pressure 117 mmHg    Ventricular Rate 84 BPM    Atrial Rate 84 BPM    CT Interval 152 ms    QRS Duration 66 ms     ms    QTc 451 ms    P Axis 41 degrees    R AXIS -10 degrees    T Axis -4 degrees    Interpretation ECG       Sinus rhythm  Normal " ECG  When compared with ECG of 12-MAR-2008 22:03,  Nonspecific T wave abnormality, worse in Anterior leads  Confirmed by SEE ED PROVIDER NOTE FOR, ECG INTERPRETATION ([id number]),  ELINA ESPINOZA ([id number]) on 1/13/2022 11:47:23 AM     INR    Collection Time: 01/13/22 11:49 AM   Result Value Ref Range    INR 1.02 0.90 - 1.15   Comprehensive metabolic panel    Collection Time: 01/13/22 11:49 AM   Result Value Ref Range    Sodium 140 136 - 145 mmol/L    Potassium 3.9 3.5 - 5.0 mmol/L    Chloride 105 98 - 107 mmol/L    Carbon Dioxide (CO2) 25 22 - 31 mmol/L    Anion Gap 10 5 - 18 mmol/L    Urea Nitrogen 20 8 - 28 mg/dL    Creatinine 1.09 0.60 - 1.10 mg/dL    Calcium 9.2 8.5 - 10.5 mg/dL    Glucose 119 70 - 125 mg/dL    Alkaline Phosphatase 56 45 - 120 U/L    AST 15 0 - 40 U/L    ALT <9 0 - 45 U/L    Protein Total 7.0 6.0 - 8.0 g/dL    Albumin 3.7 3.5 - 5.0 g/dL    Bilirubin Total 0.4 0.0 - 1.0 mg/dL    GFR Estimate 49 (L) >60 mL/min/1.73m2   CRP inflammation    Collection Time: 01/13/22 11:49 AM   Result Value Ref Range    CRP 0.4 0.0-<0.8 mg/dL   CBC (+ platelets, no diff)    Collection Time: 01/13/22 11:49 AM   Result Value Ref Range    WBC Count 4.9 4.0 - 11.0 10e3/uL    RBC Count 4.54 3.80 - 5.20 10e6/uL    Hemoglobin 13.5 11.7 - 15.7 g/dL    Hematocrit 42.6 35.0 - 47.0 %    MCV 94 78 - 100 fL    MCH 29.7 26.5 - 33.0 pg    MCHC 31.7 31.5 - 36.5 g/dL    RDW 13.8 10.0 - 15.0 %    Platelet Count 212 150 - 450 10e3/uL   Extra Red Top Tube    Collection Time: 01/13/22 11:49 AM   Result Value Ref Range    Hold Specimen JIC    UA with Microscopic reflex to Culture    Collection Time: 01/13/22  6:09 PM    Specimen: Urine, Midstream   Result Value Ref Range    Color Urine Colorless Colorless, Straw, Light Yellow, Yellow    Appearance Urine Clear Clear    Glucose Urine Negative Negative mg/dL    Bilirubin Urine Negative Negative    Ketones Urine Negative Negative mg/dL    Specific Gravity Urine 1.040 (H) 1.001 - 1.030    Blood  Urine Negative Negative    pH Urine 7.0 5.0 - 7.0    Protein Albumin Urine Negative Negative mg/dL    Urobilinogen Urine <2.0 <2.0 mg/dL    Nitrite Urine Positive (A) Negative    Leukocyte Esterase Urine 250 Tila/uL (A) Negative    Bacteria Urine Few (A) None Seen /HPF    RBC Urine 1 <=2 /HPF    WBC Urine 9 (H) <=5 /HPF    Squamous Epithelials Urine <1 <=1 /HPF    Transitional Epithelials Urine <1 <=1 /HPF     All lab studies reviewed personally    XR Chest Port 1 View    Result Date: 1/13/2022  EXAM: XR CHEST PORT 1 VIEW LOCATION: [facility] DATE/TIME: 1/13/2022 12:04 PM INDICATION: Hypertension, question of slurred speech. COMPARISON: 06/18/2019     IMPRESSION: Shallow inspiration. Mild elevation right hemidiaphragm. No focal pulmonary consolidation or visible pleural fluid. Heart size and pulmonary vascularity within normal limits. Thoracolumbar curvature. No significant change.    MR Brain w/o & w Contrast    Result Date: 1/13/2022  EXAM: MR BRAIN W/O and W CONTRAST LOCATION: [facility] DATE/TIME: 1/13/2022 4:31 PM INDICATION: Right facial droop and speech issues stroke follow-up. COMPARISON: 1/13/2022 and 7/6/2006 18. CONTRAST: 8ml Gadavist TECHNIQUE: MRI brain without and with contrast. FINDINGS: On the diffusion-weighted images there is ischemia noted within the posterior left putamen.  There is no other focus of ischemia or restricted diffusion. There is no discrete mass lesion or midline shift. There is no acute extra-axial fluid collection or acute intraparenchymal hemorrhage. On the susceptibility weighted images there are several foci of dark signal within the posterior fossa which may represent microangiopathy. There are appropriate flow voids within the cavernous portions of  the internal carotid arteries and the basilar artery. The area of ischemia on the diffusion-weighted images is visualized on the FLAIR and T2-weighted images. Following the  administration of contrast no abnormal enhancement is noted. This area has ischemia is consistent  with being subacute in nature. On the FLAIR and T2-weighted images there are multiple foci of high signal within the periventricular and subcortical white matter consistent with diffuse small vessel ischemic disease. There are multiple prominent perivascular spaces involving the basal ganglia bilaterally and old lacunar infarcts. The ventricular system, basal cisterns and the cortical sulci are consistent with diffuse volume loss. There is no evidence of cerebellar tonsillar ectopia. The corpus callosum and the sella region have appropriate configuration and signal intensity for the patient's age. The orbit regions are unremarkable. There is no significant paranasal sinus disease.  The mastoid air cells and middle ear regions are clear bilaterally.     IMPRESSION: 1. Subacute ischemia posterior putamen. 2. No evidence of a mass lesion, midline shift or acute hemorrhage. 3.  No abnormal enhancement. 4.  Diffuse age related changes. These findings were communicated by phone to Dr. Rocha at 5:45 AM on 01/13/2022.    CTA Head Neck with Contrast    Result Date: 1/13/2022  HEAD AND NECK CT ANGIOGRAM WITH IV CONTRAST 1/13/2022 1:36 PM INDICATION: Stroke/TIA, assess extracranial arteries. Aphasia, slurred speech. Right facial droop. TECHNIQUE: Head and neck CT angiogram with IV contrast. Noncontrast head CT followed by axial helical CT images of the head and neck vessels obtained during the arterial phase of intravenous contrast administration. Axial helical 2D reconstructed images and multiplanar 3D MIP reconstructed images of the head and neck vessels were performed by the technologist. Dose reduction techniques were used. CONTRAST: Isovue 370, 75 mL. COMPARISON: Carotid ultrasound 10/14/2016, brain MRI 7/6/2016. FINDINGS: NONCONTRAST HEAD CT: No intracranial hemorrhage, extraaxial collection, mass effect or CT evidence of  acute infarct.  Mild presumed chronic small vessel ischemic changes. Chronic appearing lacunar infarct in the posterior left putamen is new versus 2016.  Mild generalized volume loss. The ventricles are proportional to the sulci. Osseous structures are intact. The visualized orbits, paranasal sinuses and mastoid air cells are free of significant disease. HEAD CTA: No proximal large vessel occlusion. Carotid siphon atherosclerosis without narrowing. Severe stenosis of the distal A2 segment of the left anterior cerebral artery. More mild to moderate multifocal narrowing in the more distal branches of the anterior cerebral arteries. Severe focal stenosis of the mid posterior division M2 branch of the right middle cerebral artery. More mild atherosclerosis distal to this. Mild atherosclerosis in the mid M2 segment of the left middle cerebral artery with moderate to severe narrowing in the distal posterior division M2 branches. Vertebral and basilar arteries are patent. Prominent posterior communicating arteries are the dominant supply to both posterior cerebral arteries which demonstrate multifocal areas of moderate to severe atherosclerotic narrowing, more pronounced on the right than on the left. The dural venous sinuses are not well visualized due to the arterial timing of the contrast bolus. NECK CTA: Retroesophageal right subclavian artery.  Carotid arteries are patent with mild atherosclerotic change.  No hemodynamically significant stenosis by NASCET criteria in either carotid system.  Patent vertebral arteries. No dissection.     CONCLUSION: HEAD CT: 1.  No intracranial hemorrhage, mass, or definite CT evidence of recent ischemia. 2.  Chronic appearing lacunar infarct in the posterior left putamen is new versus 2016. 3.  Mild atrophy and presumed chronic small vessel ischemic changes. HEAD CTA: 1.  Multifocal intracranial atherosclerosis most pronounced involving the left anterior, right middle and posterior  cerebral arteries. 2.  No proximal large vessel occlusion. 3.  No aneurysm. NECK CTA: 1.  Mild carotid artery atherosclerosis. No dissection or hemodynamically significant narrowing in the neck by NASCET criteria.     Radiology report reviewed.        Micheal Fierro MD  [facility] Medicine Service

## 2022-01-14 NOTE — CONSULTS
CARDIOLOGY CONSULT NOTE         Assessment:   1.  Abnormal echocardiogram- echo shows Lambl's excrescences.  Review of echo shows small, minimal, minimally mobile.  Doubt this is the etiology of the CVA.  If it was large sized and definitely embolic might consider it as the etiology.  In addition, looks to be possible lacunar stroke secondary to hypertension.  In any event, initial treatment for these is antiplatelet therapy and if recurrent anticoagulation thereafter and lastly surgery.  Patient does not want any aggressive interventions and thus agree with antiplatelet therapy.  2.  Ischemic stroke (H)-Actually appears to be lacunar, involving subacute posterior putamen.  Echo did not evaluate for PFO.  Defer to neurology whether they want this performed.  Aspirin and Plavix.  3.  Hypertension benign essential- significantly elevated blood pressures, on lisinopril and metoprolol and need to uptitrate these as tolerated.     Plan:   1.  No specific additional cardiac therapy.  2.  Control blood pressure per neurology recommendations.  3.  No need for cardiology follow-up or treatment other than what doing for Lambl's excrescences.  4.  Cardiology has nothing further inpatient to add, we will sign off, available as needed.  5.  No reason for cardiology follow-up at this point time.     Current History:   [facility] [place] [facility] has been requested by Dr. 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  Patient was in her usual state of health until the last few days and she is felt somewhat more fatigued than normal.  There might have been a little bit of a headache.  The day before presentation she might of had some slurred speech and her grandson came and visited and felt that her speech was slurred.  She was brought to the emergency department and felt to have a subacute CVA involving the posterior putamen with only some minimally residual  slurred speech.  Echo suggests excrescences on the aortic valve aortic surface.  Cardiology consultation was requested.  Patient denies any significant shortness of breath, PND, orthopnea, chest discomfort, syncope or dizziness.    Past Medical History:     Past Medical History:   Diagnosis Date     Anxiety      CKD (chronic kidney disease), stage III (H)      Depression      HLD (hyperlipidemia)      HTN (hypertension)      Hypothyroidism      Lumbar spondylosis      Osteoporosis      Past history is negative for cancer, tuberculosis, diabetes mellitus, myocardial infarction,  rheumatic fever, cerebrovascular accident, chronic kidney disease, peptic ulcer disease, chronic obstructive pulmonary disease.    Past Surgical History:     Past Surgical History:   Procedure Laterality Date      REMOVAL OF TONSILS,<13 Y/O      Description: Tonsillectomy;  Recorded: 05/22/2014;     [facility] REMOVAL OF OVARY(S)      Description: Oophorectomy;  Recorded: 05/22/2014;     [facility] TOTAL ABDOM HYSTERECTOMY      Description: Hysterectomy;  Recorded: 05/22/2014;     [facility] TOTAL ABDOM HYSTERECTOMY      Description: Total Abdominal Hysterectomy;  Recorded: 05/22/2014;     Family History:   Reviewed, and negative for coronary disease.    Social History:   Reviewed, and she  reports that she has never smoked. She has never used smokeless tobacco. She reports that she does not drink alcohol.  She is , lives independently alone, and primary physician is Dr. Edin Moise.    Meds:       aspirin  81 mg Oral Daily    Or     aspirin  81 mg Oral or NG Tube Daily     atorvastatin  40 mg Oral QPM     clopidogrel  300 mg Oral Daily     [START ON 1/15/2022] clopidogrel  75 mg Oral Daily     enoxaparin ANTICOAGULANT  40 mg Subcutaneous Q24H     famotidine  20 mg Intravenous Q12H     lisinopril  10 mg Oral Daily     metoprolol tartrate  25 mg Oral BID     sodium chloride (PF)  3 mL Intracatheter Q8H     Allergies:   Seafood    Review of Systems:  "  A 12 point comprehensive review of systems was  as follows:   General: negative for fatigue, weight loss or weight gain  Constitutional: negative for anorexia, chills, fevers, malaise, night sweats   Eyes: negative for cataracts, color blindness, contacts/glasses, glaucoma, icterus, irritation, redness and visual disturbance  Ears, nose, mouth, throat, and face: negative for ear drainage, earaches, epistaxis, facial trauma, hearing loss, hoarseness, nasal congestion, snoring, sore mouth, sore throat, tinnitus and voice change  Respiratory: negative for cough, dyspnea on exertion,  hemoptysis, sputum production, pleurisy, stridor, wheezing, PND, orthopnea  Cardiovascular: negative for chest pain, chest pressure/discomfort, claudication, dyspnea, exertional chest pressure/discomfort, fatigue, irregular heart beat, lower extremity edema, near-syncope,  palpitations,  syncope   Gastrointestinal: negative for abdominal pain, change in bowel haibits, constipation, diarrhea, dyspepsia, dysphagia, jaundice, melena, nausea, odynophagia, reflux symptoms and vomiting  Genitourinary: negative for decreased stream  Hematologic/lymphatic: negative for bleeding  Musculoskeletal: negative for arthralgias, back pain, bone pain, muscle weakness, myalgias, neck pain and stiff joints  Neurological: Positive for slurred speech, negative for syncope, presyncope, coordination problems, dizziness, gait problems, headaches, memory problems, paresthesia, seizures,  tremors, vertigo and weakness    Objective:     Physical Exam:  BP (!) 160/88   Pulse 83   Temp 98.6  F (37  C) (Oral)   Resp (!) 32   Ht 1.651 m (5' 5\")   Wt 81.6 kg (180 lb)   SpO2 94%   BMI 29.95 kg/m       Head:    Normocephalic, without obvious abnormality, atraumatic   Eyes:    PERRL, conjunctiva/corneas clear, EOM's intact,           Nose:   Nares normal, septum midline, mucosa normal, no drainage  or sinus tenderness   Throat:   Lips, mucosa, and tongue normal; " teeth and gums normal   Neck:   Supple, symmetrical, trachea midline, no adenopathy;        thyroid:  No enlargement/tenderness/nodules; no carotid    bruit or JVD   Back:     Symmetric, no curvature, ROM normal, no CVA tenderness   Lungs:     Clear to auscultation bilaterally, respirations unlabored   Chest wall:    No tenderness or deformity   Heart:    Regular rate and rhythm, S1 and S2 normal, no murmur, rub   or gallop   Abdomen:     Soft, non-tender, bowel sounds active all four quadrants,     no masses, no organomegaly   Extremities:   Extremities normal, atraumatic, no cyanosis or edema   Pulses:   2+ and symmetric all extremities   Skin:   Skin color, texture, turgor normal, no rashes or lesions   Neurologic:   CNII-XII intact. Normal strength, sensation and reflexes       throughout     Cardiographics and Imaging  Radiology Results: Chest x-ray shows Shallow inspiration. Mild elevation right hemidiaphragm. No focal pulmonary consolidation or visible pleural fluid. Heart size and pulmonary vascularity within normal limits. Thoracolumbar curvature. No significant change.    EKG Results: personally reviewed sinus rhythm, nonspecific ST-T wave changes.    Lab Review   Pertinent Labs  Lab Results: personally reviewed       No results found for: CKTOTAL, CKMB, TROPONINI    Lab Results   Component Value Date    BUN 20 01/14/2022     01/14/2022    CO2 23 01/14/2022       Lab Results   Component Value Date    WBC 6.8 01/14/2022    HGB 13.7 01/14/2022    HCT 42.9 01/14/2022    MCV 92 01/14/2022     01/14/2022       No results found for: BNP

## 2022-01-14 NOTE — PROGRESS NOTES
Occupational Therapy      01/14/22 1135   Quick Adds   Type of Visit Initial Occupational Therapy Evaluation   Living Environment   People in home alone   Current Living Arrangements independent living facility   Home Accessibility no concerns   Transportation Anticipated family or friend will provide   Living Environment Comments W/I shower w/ built in seat/GB   Self-Care   Equipment Currently Used at Home walker, rolling;other (see comments)   Activity/Exercise/Self-Care Comment Pt reports being ind. w/ all self cares.    Instrumental Activities of Daily Living (IADL)   Previous Responsibilities meal prep;laundry;medication management;finances;driving   IADL Comments Pt reports daughter gets groceries    Disability/Function   Hearing Difficulty or Deaf no   Wear Glasses or Blind yes   Vision Management glasses   Concentrating, Remembering or Making Decisions Difficulty no   Walking or Climbing Stairs Difficulty yes   Walking or Climbing Stairs ambulation difficulty, requires equipment   Dressing/Bathing Difficulty no   Toileting issues no   Doing Errands Independently Difficulty (such as shopping) yes   Errands Management Daughter assists    Fall history within last six months no   General Information   Onset of Illness/Injury or Date of Surgery 01/13/22   Referring Physician Mack Friedman MBBS   Patient/Family Therapy Goal Statement (OT) to get back home    Additional Occupational Profile Info/Pertinent History of Current Problem Pt presenting w/ dysarthria and mild R. facial droop, T indicating CVA L. post. putamen. PMHx HTN, hyperthyroidism, anxiety/depression   Cognitive Status Examination   Orientation Status person;place   Range of Motion Comprehensive   General Range of Motion no range of motion deficits identified   Strength Comprehensive (MMT)   General Manual Muscle Testing (MMT) Assessment upper extremity strength deficits identified   Upper Extremity (Manual Muscle Testing)   Upper Extremity:  Manual Muscle Testing (MMT) left shoulder strength deficit;right shoulder strength deficit;left wrist strength deficit   Bed Mobility   Bed Mobility supine-sit;sit-supine   Supine-Sit [place] (Bed Mobility) supervision;verbal cues   Sit-Supine [place] (Bed Mobility) supervision;verbal cues   Assistive Device (Bed Mobility) bed rails   Transfers   Transfers sit-stand transfer   Sit-Stand Transfer   Sit-Stand [place] (Transfers) contact guard;verbal cues;supervision   Assistive Device (Sit-Stand Transfers) walker, standard   Balance   Balance Assessment standing balance: static;standing balance: dynamic   Standing Balance: Static fair balance   Standing Balance: Dynamic fair balance   Activities of Daily Living   BADL Assessment grooming   Grooming Assessment   [place] Level (Grooming) set up;verbal cues   Position (Grooming) unsupported sitting   Clinical Impression   Criteria for Skilled Therapeutic Interventions Met (OT) yes;skilled treatment is necessary   OT Diagnosis weakness/decreased balance, safety awareness   OT Problem List-Impairments impacting ADL problems related to;activity tolerance impaired;balance;cognition;communication;mobility;strength   Assessment of Occupational Performance 3-5 Performance Deficits   Identified Performance Deficits safety awareness, balance/strength, trnf/mobility, toileting/dressing   Planned Therapy Interventions (OT) ADL retraining;IADL retraining;balance training;strengthening   Clinical Decision Making Complexity (OT) moderate complexity   Therapy Frequency (OT) Daily   Predicted Duration of Therapy 5   Risk & Benefits of therapy have been explained evaluation/treatment results reviewed;patient   OT Discharge Planning    OT Discharge Recommendation (DC Rec) Transitional Care Facility;Home with assist   OT Rationale for DC Rec Assistx 1 trnf/mobility/self cares, OT recommending 24 hour supervision/assist for all self cares.    Total Evaluation Time  (Minutes)   Total Evaluation Time (Minutes) 6

## 2022-01-14 NOTE — PHARMACY-ADMISSION MEDICATION HISTORY
Pharmacy Note - Admission Medication History    Pertinent Provider Information: Pt reports has only been taking trazodone for sleep.      ______________________________________________________________________    Prior To Admission (PTA) med list completed and updated in EMR.       PTA Med List   Medication Sig Last Dose     traZODone (DESYREL) 50 MG tablet [TRAZODONE (DESYREL) 50 MG TABLET] TAKE 1-2 TABLETS BY MOUTH EVERY NIGHT AT BEDTIME AS NEEDED . 1/12/2022 at PM       Information source(s): Patient and CareEveryyes[facility]  Method of interview communication: in-person    Summary of Changes to PTA Med List  New: N/A  Discontinued: Synthroid, lisinopril, Zoloft  Changed: N/A    Patient was asked about OTC/herbal products specifically.  PTA med list reflects this.    In the past week, patient estimated taking medication this percent of the time:  less than 50% due to other.    Allergies were reviewed, assessed, and updated with the patient.      Patient does not use any multi-dose medications prior to admission.    The information provided in this note is only as accurate as the sources available at the time of the update(s).    Thank you for the opportunity to participate in the care of this patient.    Koko Gonzalez [facility]  1/13/2022 6:48 PM

## 2022-01-14 NOTE — CONSULTS
"    [facility]    Stroke Telephone Note    I was called by Cherelle Rocha on 01/13/22 regarding patient Remedios Vincent. The patient is a 87 year old female presents to ED with slurred speech. Patient first thought to have slurred speech yesterday when interacting with family members.  Daughter saw her today and thought the speech issues persisted and prompted evaluation    Imaging Findings   Ct head showed L basal ganglia hypodensity  CTA showed multifocal intracranial stenosis  MRI showed L BG infarct    Impression  Acute ischemic stroke of basal ganaglia on left due to undetermined etiology       Recommendations   - Use orderset: \"Ischemic Stroke Routine Admission\" or \"Ischemic Stroke No Thrombolytics/No Thrombectomy ICU Admission\"  - Neurochecks and Vital Signs every 4 hours   - Permissive HTN; goal SBP < 220 mmHg  - Daily aspirin 325 mg for secondary stroke prevention  - Statin: Lipitor 40 mg daily. Titrate to target LDL <70  - MRI Brain with and without contrast  - TTE (with Bubble Study if age 60 yrs or less)  - Telemetry, EKG  - Bedside Glucose Monitoring  - A1c, Lipid Panel, Troponin x 3  - PT/OT/SLP  - Stroke Education  - Euthermia, Euglycemia    My recommendations are based on the information provided over the phone by Remedios Vincent's in-person providers. They are not intended to replace the clinical judgment of her in-person providers. I was not requested to personally see or examine the patient at this time.    Cornelius Naidu MD  Vascular Neurology  To page me or covering stroke neurology team member, click here: [email or web address]   Choose \"On Call\" tab at top, then search dropdown box for \"Neurology Adult\", select location, press Enter, then look for stroke/neuro ICU/telestroke.         "

## 2022-01-14 NOTE — PROGRESS NOTES
Daily Progress Note        CODE STATUS:  Full Code    01/14/22  Assessment/Plan:   88 YO female with h/o hypertension, hyperlipidemia, CKD-III who was brought to our ED or evaluation of slurred speech    Subacute CVA:   -- MRI shows subacute ischemia posterior putamen.    -- Given aspirin, ischemic stroke set ordered, neurology consulted.  Lipid panel reviewed, started on statin.    -- ASA and Plavic per neurology. Appreciate the consult.  -- PT/OT, echocardiogram ordered.  -- Passed swallow study     Hypertensive urgency:   -- Blood pressures have been as high as 232/131.  Receiving IV beta-blocker in the ED with transient lowering.  She has not been compliant with her home blood pressure medications. -- Resumed lisinopril. Added metoprolol 25mg BID.      Abnormal echo:  -- Echo showed aortic valve Lambl's excrescences  -- Consult cardiology    Hypothyroidism:   -- Patient never started synthroid per report. TSH- normal. Free T4 was normal in July 2021     History of anxiety disorder/depression:   -- Supposed to be on Zoloft, she apparently has been noncompliant, will hold on this for right now as she has not taken this in weeks.      Barriers to discharge: Stroke, further work-up, PT/OT eval     LOS: 1 day     Subjective:  Interval History: Patient seen and examined. Notes, labs, imaging reports personally reviewed. Patient seen in ED- hallway 3. She reported continued slurred speech that she could tell. Denies any swallowing difficulty. No headaches. No nausea/vomiting. Denies any focal weakness.     Review of Systems:   As mentioned in subjective.    Patient Active Problem List   Diagnosis     Hypothyroidism     Hyperlipidemia     Anxiety     Hypertension     Chronic kidney disease, stage III (moderate) (H)     Lumbar Spondylosis     Lower Back Pain     Osteoporosis     Fatigue     Primary osteoarthritis of both knees     Sternal mass     Pain of left sternoclavicular joint     Ischemic stroke (H)        Scheduled Meds:    aspirin  81 mg Oral Daily    Or     aspirin  81 mg Oral or NG Tube Daily     atorvastatin  40 mg Oral QPM     clopidogrel  300 mg Oral Daily     [START ON 1/15/2022] clopidogrel  75 mg Oral Daily     enoxaparin ANTICOAGULANT  40 mg Subcutaneous Q24H     famotidine  20 mg Intravenous Q12H     [Held by provider] lisinopril  10 mg Oral Daily     sodium chloride (PF)  3 mL Intracatheter Q8H     Continuous Infusions:    - MEDICATION INSTRUCTIONS -       - MEDICATION INSTRUCTIONS -       PRN Meds:.acetaminophen, labetalol **OR** hydrALAZINE, lidocaine 4%, lidocaine (buffered or not buffered), - MEDICATION INSTRUCTIONS -, - MEDICATION INSTRUCTIONS -, ondansetron **OR** ondansetron, sodium chloride (PF), traZODone    Objective:  Vital signs in last 24 hours:  Temp:  [98.6  F (37  C)] 98.6  F (37  C)  Pulse:  [54-89] 81  Resp:  [12-47] 25  BP: (125-232)/() 186/102  SpO2:  [92 %-97 %] 97 %      No intake or output data in the 24 hours ending 01/14/22 1425    Physical Exam:    General: Not in obvious distress.  HEENT: NC, AT   Chest: Clear to auscultation bilaterally  Heart: S1S2 normal, regular. No M/R/G  Abdomen: Soft. NT, ND. Bowel sounds- active.  Extremities: No legs swelling  Neuro: Alert and awake, grossly non-focal. Slurred speech      Lab Results:(I have personally reviewed the results)    Recent Results (from the past 24 hour(s))   UA with Microscopic reflex to Culture    Collection Time: 01/13/22  6:09 PM    Specimen: Urine, Midstream   Result Value Ref Range    Color Urine Colorless Colorless, Straw, Light Yellow, Yellow    Appearance Urine Clear Clear    Glucose Urine Negative Negative mg/dL    Bilirubin Urine Negative Negative    Ketones Urine Negative Negative mg/dL    Specific Gravity Urine 1.040 (H) 1.001 - 1.030    Blood Urine Negative Negative    pH Urine 7.0 5.0 - 7.0    Protein Albumin Urine Negative Negative mg/dL    Urobilinogen Urine <2.0 <2.0 mg/dL    Nitrite Urine  Positive (A) Negative    Leukocyte Esterase Urine 250 Tila/uL (A) Negative    Bacteria Urine Few (A) None Seen /HPF    RBC Urine 1 <=2 /HPF    WBC Urine 9 (H) <=5 /HPF    Squamous Epithelials Urine <1 <=1 /HPF    Transitional Epithelials Urine <1 <=1 /HPF   Asymptomatic COVID-19 Virus (Coronavirus) by PCR Nasopharyngeal    Collection Time: 01/13/22  8:40 PM    Specimen: Nasopharyngeal; Swab   Result Value Ref Range    SARS CoV2 PCR Negative Negative   Glucose by meter    Collection Time: 01/13/22 11:30 PM   Result Value Ref Range    GLUCOSE BY METER POCT 77 70 - 99 mg/dL   Glucose by meter    Collection Time: 01/14/22  4:47 AM   Result Value Ref Range    GLUCOSE BY METER POCT 83 70 - 99 mg/dL   CBC with platelets    Collection Time: 01/14/22  8:43 AM   Result Value Ref Range    WBC Count 6.8 4.0 - 11.0 10e3/uL    RBC Count 4.66 3.80 - 5.20 10e6/uL    Hemoglobin 13.7 11.7 - 15.7 g/dL    Hematocrit 42.9 35.0 - 47.0 %    MCV 92 78 - 100 fL    MCH 29.4 26.5 - 33.0 pg    MCHC 31.9 31.5 - 36.5 g/dL    RDW 13.8 10.0 - 15.0 %    Platelet Count 233 150 - 450 10e3/uL   Basic metabolic panel    Collection Time: 01/14/22  8:43 AM   Result Value Ref Range    Sodium 138 136 - 145 mmol/L    Potassium 4.2 3.5 - 5.0 mmol/L    Chloride 105 98 - 107 mmol/L    Carbon Dioxide (CO2) 23 22 - 31 mmol/L    Anion Gap 10 5 - 18 mmol/L    Urea Nitrogen 20 8 - 28 mg/dL    Creatinine 0.96 0.60 - 1.10 mg/dL    Calcium 9.3 8.5 - 10.5 mg/dL    Glucose 82 70 - 125 mg/dL    GFR Estimate 57 (L) >60 mL/min/1.73m2       All laboratory data reviewed  Serum Glucose range:   Recent Labs   Lab 01/14/22  0843 01/14/22  0447 01/13/22  2330 01/13/22  1149   GLC 82 83 77 119     ABG: No lab results found in last 7 days.  CBC:   Recent Labs   Lab 01/14/22  0843 01/13/22  1149   WBC 6.8 4.9   HGB 13.7 13.5   HCT 42.9 42.6   MCV 92 94    212     Chemistry:   Recent Labs   Lab 01/14/22  0843 01/13/22  1149    140   POTASSIUM 4.2 3.9   CHLORIDE 105 105    CO2 23 25   BUN 20 20   CR 0.96 1.09   GFRESTIMATED 57* 49*   LUBNA 9.3 9.2   PROTTOTAL  --  7.0   ALBUMIN  --  3.7   AST  --  15   ALT  --  <9   ALKPHOS  --  56   BILITOTAL  --  0.4     Coags:  Recent Labs   Lab 22  1149   INR 1.02     Cardiac Markers:  No results for input(s): CKTOTAL, TROPONINI in the last 168 hours.       Echocardiogram Complete - For age > 60 yrs    Result Date: 2022  [id number]^ROBLES^[place] [address]  Name: REYNOLD KHAN MRN: [id number] : 1934 Study Date: 2022 07:00 AM Age: 87 yrs Gender: Female Patient Location: [facility] Reason For Study: Cerebrovascular Incident Ordering Physician: THU ARBOLEDA Performed By: JAKE  BSA: 1.9 m2 Height: 65 in Weight: 180 lb HR: 65 BP: 157/78 mmHg ______________________________________________________________________________ Procedure Complete Portable Echo Adult. ______________________________________________________________________________ Interpretation Summary  1. Normal left ventricular size and systolic performance with a visually estimated ejection fraction of 55-60%. 2. There is mild basal septal hypertrophy. 3. There is a small mobile echo associated with the aortic valve. The anatomic location and echocardiographic features are consistent with Lambl's excrescences. 4. Normal right ventricular size and systolic performance. ______________________________________________________________________________ Left ventricle: Normal left ventricular size and systolic performance with a visually estimated ejection fraction of 55%. There is normal regional wall motion. There is mild basal septal hypertrophy.  Assessment of LV Diastolic Function: The cumulative findings suggest normal diastolic filling [The septal e' velocity is < 7 cm/s & lateral e' velocity is < 10 cm/s. The average E/e' is < 14. TR velocity is < 2.8 m/s. Left atrial volume index is  less than 34 mL/mÂ ].  Right ventricle: Normal right ventricular size and systolic performance.  Left atrium: The left atrium is of normal size.  Right atrium: The right atrium is of normal size.  IVC: The aortic valve is comprised of three cusps.  Aortic valve: The aortic valve is comprised of three cusps. No significant aortic stenosis or aortic insufficiency is detected on this study. There is a small mobile echo associated with the aortic valve. The anatomic location and echocardiographic features are consistent with Lambl's excrescences.  Mitral valve: The mitral valve appears morphologically normal. There is mild mitral insufficiency.  Tricuspid valve: The tricuspid valve is grossly morphologically normal. There is mild tricuspid insufficiency.  Pulmonic valve: The pulmonic valve is grossly morphologically normal.  Thoracic aorta: The aortic root and proximal ascending aorta are of normal dimension.  Pericardium: There is no significant pericardial effusion. ______________________________________________________________________________ ______________________________________________________________________________ MMode/2D Measurements & Calculations IVSd: 1.6 cm LVIDd: 3.8 cm LVIDs: 2.2 cm LVPWd: 1.0 cm FS: 41.2 % LV mass(C)d: 170.0 grams LV mass(C)dI: 89.9 grams/m2 Ao root diam: 3.1 cm LA dimension: 3.7 cm asc Aorta Diam: 3.4 cm LA/Ao: 1.2 LVOT diam: 1.9 cm LVOT area: 2.7 cm2 LA Volume (BP): 47.9 ml  LA Volume Index (BP): 25.3 ml/m2 LA Volume Indexed (AL/bp): 24.8 ml/m2 RWT: 0.53  Doppler Measurements & Calculations MV E max tanika: 74.2 cm/sec MV A max tanika: 126.1 cm/sec MV E/A: 0.59 MV dec slope: 236.8 cm/sec2 MV dec time: 0.31 sec Ao V2 max: 179.2 cm/sec Ao max P.0 mmHg Ao V2 mean: 118.4 cm/sec Ao mean P.3 mmHg Ao V2 VTI: 39.2 cm JATIN(I,D): 1.7 cm2 JATIN(V,D): 1.7 cm2 LV V1 max P.9 mmHg LV V1 max: 110.6 cm/sec LV V1 VTI: 24.1 cm  SV(LVOT): 66.2 ml SI(LVOT): 35.0 ml/m2 PA V2 max: 86.9 cm/sec PA max  PG: 3.0 mmHg PA acc time: 0.15 sec TR max demarcus: 270.1 cm/sec TR max P.2 mmHg AV Demarcus Ratio (DI): 0.62 JATIN Index (cm2/m2): 0.89 E/E' av.5 Lateral E/e': 5.8 Medial E/e': 21.3  ______________________________________________________________________________ Report approved by: Melissa Wiley 2022 09:46 AM       XR Chest Port 1 View    Result Date: 2022  EXAM: XR CHEST PORT 1 VIEW LOCATION: [facility] DATE/TIME: 2022 12:04 PM INDICATION: Hypertension, question of slurred speech. COMPARISON: 2019     IMPRESSION: Shallow inspiration. Mild elevation right hemidiaphragm. No focal pulmonary consolidation or visible pleural fluid. Heart size and pulmonary vascularity within normal limits. Thoracolumbar curvature. No significant change.    MR Brain w/o & w Contrast    Result Date: 2022  EXAM: MR BRAIN W/O and W CONTRAST LOCATION: [facility] DATE/TIME: 2022 4:31 PM INDICATION: Right facial droop and speech issues stroke follow-up. COMPARISON: 2022 and 2006 18. CONTRAST: 8ml Gadavist TECHNIQUE: MRI brain without and with contrast. FINDINGS: On the diffusion-weighted images there is ischemia noted within the posterior left putamen.  There is no other focus of ischemia or restricted diffusion. There is no discrete mass lesion or midline shift. There is no acute extra-axial fluid collection or acute intraparenchymal hemorrhage. On the susceptibility weighted images there are several foci of dark signal within the posterior fossa which may represent microangiopathy. There are appropriate flow voids within the cavernous portions of  the internal carotid arteries and the basilar artery. The area of ischemia on the diffusion-weighted images is visualized on the FLAIR and T2-weighted images. Following the administration of contrast no abnormal enhancement is noted. This area has ischemia is consistent  with being subacute in nature.  On the FLAIR and T2-weighted images there are multiple foci of high signal within the periventricular and subcortical white matter consistent with diffuse small vessel ischemic disease. There are multiple prominent perivascular spaces involving the basal ganglia bilaterally and old lacunar infarcts. The ventricular system, basal cisterns and the cortical sulci are consistent with diffuse volume loss. There is no evidence of cerebellar tonsillar ectopia. The corpus callosum and the sella region have appropriate configuration and signal intensity for the patient's age. The orbit regions are unremarkable. There is no significant paranasal sinus disease.  The mastoid air cells and middle ear regions are clear bilaterally.     IMPRESSION: 1. Subacute ischemia posterior putamen. 2. No evidence of a mass lesion, midline shift or acute hemorrhage. 3.  No abnormal enhancement. 4.  Diffuse age related changes. These findings were communicated by phone to Dr. Rocha at 5:45 AM on 01/13/2022.    CTA Head Neck with Contrast    Result Date: 1/13/2022  HEAD AND NECK CT ANGIOGRAM WITH IV CONTRAST 1/13/2022 1:36 PM INDICATION: Stroke/TIA, assess extracranial arteries. Aphasia, slurred speech. Right facial droop. TECHNIQUE: Head and neck CT angiogram with IV contrast. Noncontrast head CT followed by axial helical CT images of the head and neck vessels obtained during the arterial phase of intravenous contrast administration. Axial helical 2D reconstructed images and multiplanar 3D MIP reconstructed images of the head and neck vessels were performed by the technologist. Dose reduction techniques were used. CONTRAST: Isovue 370, 75 mL. COMPARISON: Carotid ultrasound 10/14/2016, brain MRI 7/6/2016. FINDINGS: NONCONTRAST HEAD CT: No intracranial hemorrhage, extraaxial collection, mass effect or CT evidence of acute infarct.  Mild presumed chronic small vessel ischemic changes. Chronic appearing lacunar infarct in the posterior left  putamen is new versus 2016.  Mild generalized volume loss. The ventricles are proportional to the sulci. Osseous structures are intact. The visualized orbits, paranasal sinuses and mastoid air cells are free of significant disease. HEAD CTA: No proximal large vessel occlusion. Carotid siphon atherosclerosis without narrowing. Severe stenosis of the distal A2 segment of the left anterior cerebral artery. More mild to moderate multifocal narrowing in the more distal branches of the anterior cerebral arteries. Severe focal stenosis of the mid posterior division M2 branch of the right middle cerebral artery. More mild atherosclerosis distal to this. Mild atherosclerosis in the mid M2 segment of the left middle cerebral artery with moderate to severe narrowing in the distal posterior division M2 branches. Vertebral and basilar arteries are patent. Prominent posterior communicating arteries are the dominant supply to both posterior cerebral arteries which demonstrate multifocal areas of moderate to severe atherosclerotic narrowing, more pronounced on the right than on the left. The dural venous sinuses are not well visualized due to the arterial timing of the contrast bolus. NECK CTA: Retroesophageal right subclavian artery.  Carotid arteries are patent with mild atherosclerotic change.  No hemodynamically significant stenosis by NASCET criteria in either carotid system.  Patent vertebral arteries. No dissection.     CONCLUSION: HEAD CT: 1.  No intracranial hemorrhage, mass, or definite CT evidence of recent ischemia. 2.  Chronic appearing lacunar infarct in the posterior left putamen is new versus 2016. 3.  Mild atrophy and presumed chronic small vessel ischemic changes. HEAD CTA: 1.  Multifocal intracranial atherosclerosis most pronounced involving the left anterior, right middle and posterior cerebral arteries. 2.  No proximal large vessel occlusion. 3.  No aneurysm. NECK CTA: 1.  Mild carotid artery atherosclerosis.  No dissection or hemodynamically significant narrowing in the neck by NASCET criteria.       Latest radiology report personally reviewed.    Note created using dragon voice recognition software so sounds alike errors may have escaped editing.      01/14/2022   Mack Friedman MD  Hospitalist, [facility]  Pager: [phone]

## 2022-01-14 NOTE — ED NOTES
"Answered pt's call light and pt states \"I want to go home\" Pt continued to voice concerns about not wanting to go live with Cassia says I'm not ready nor at that point where I need to give up my place.  I live in a [address] and older building that is secure, I have friends there, I play cards twice a week and I don't even go out for groceries I give my list to Cassia and she orders it for me and they deliver it.  I made the same mistake with my mom when she was 88 we thought it would be best to  her out of her place and that was a mistake we stripped her of ever thing and I can see the same thing happening to me.  Beside Clare and I are to much alike and she is a drinker and it just wouldn't work, it would revert to when she was a teenager and it just won't work.  I just want to go home.  I hope that when its time for me to give up my place that I can do it willingly but right now I'm not at that point.\".  "

## 2022-01-14 NOTE — ED NOTES
-Updated granddaughter Steph via phone about patient status after getting verbal consent to share information from patient.

## 2022-01-14 NOTE — ED NOTES
Pt helped to commode; urine obtained and sent to lab. Pt is able to stand with assist of one; she states that her knees are bad. Pt placed on cardiac monitor, pulse oximetry; her BP continues to be elevated; ED provider is aware.

## 2022-01-14 NOTE — PROGRESS NOTES
01/14/22 1415   General Information   Onset of Illness/Injury or Date of Surgery 01/13/22   Patient/Family Therapy Goal Statement (SLP) Eat lunch   Pertinent History of Current Problem Per MD note: 87-year-old female who presents with stroke.  NIH stroke scale two for dysarthria and mild right facial droop.  MRI confirms a small left putaminal stroke.  Etiology of stroke is not entirely clear.    General Observations Alert and cooperative; Jocular   Type of Evaluation   Type of Evaluation Swallow Evaluation   Oral Motor   Oral Musculature generally intact   Dentition (Oral Motor)   Dentition (Oral Motor) adequate dentition   Facial Symmetry (Oral Motor)   Facial Symmetry (Oral Motor)   (WFL; Slight right upper lip droop intermittently)   Right Side Facial Asymmetry other (see comments)  (Slight; specific movements only; smile symmetrical)   Lip Function (Oral Motor)   Lip Strength (Oral Motor) right side;mild impairment  ('a little lets out on right')   Tongue Function (Oral Motor)   Tongue ROM (Oral Motor) WNL   General Swallowing Observations   Current Diet/Method of Nutritional Intake (General Swallowing Observations, NIS) NPO   Swallowing Evaluation Clinical swallow evaluation   Clinical Swallow Evaluation   Clinical Swallow Evaluation Textures Trialed thin liquids;pureed;solid foods   Clinical Swallow Eval: Thin Liquid Texture Trial   Mode of Presentation, Thin Liquids cup;straw   Volume of Liquid or Food Presented 4 ounces   Oral Phase of Swallow WFL  (mild oral loss with cup more than straw)   Pharyngeal Phase of Swallow intact   Diagnostic Statement No s/s aspiration   Clinical Swallow Evaluation: Puree Solid Texture Trial   Mode of Presentation, Puree spoon   Volume of Puree Presented 3 ounces   Oral Phase, Puree WFL   Pharyngeal Phase, Puree intact   Diagnostic Statement No s/s aspiration   Clinical Swallow Evaluation: Solid Food Texture Trial   Mode of Presentation self-fed   Volume Presented 1 cracker  and .5 sandwich   Oral Phase WFL   Pharyngeal Phase intact   Diagnostic Statement Mastication safe and complete. No oral loss or significant residual.   Swallowing Recommendations   Diet Consistency Recommendations thin liquids (level 0);regular diet   Supervision Level for Intake patient independent   Mode of Delivery Recommendations bolus size, small;slow rate of intake   Monitoring/Assistance Required (Eating/Swallowing) monitor for cough or change in vocal quality with intake   Medication Administration Recommendations, Swallowing (SLP) per patient preference   Instrumental Assessment Recommendations instrumental evaluation not recommended at this time   Comment, Swallowing Recommendations BSS completed. Patient had no s/s aspiration with any intake. Mastication was safe and complete.    General Therapy Interventions   Planned Therapy Interventions Dysphagia Treatment   Dysphagia treatment Compensatory strategies for swallowing;Instruction of safe swallow strategies   Intervention Comments Diet tolerance and f/u oral loss   SLP Therapy Assessment/Plan   Criteria for Skilled Therapeutic Interventions Met (SLP Eval) yes;treatment indicated   SLP Diagnosis mild oral dysphagia   Rehab Potential (SLP Eval) good, to achieve stated therapy goals   Therapy Frequency (SLP Eval) 5 times/wk   Predicted Duration of Therapy Intervention (SLP Eval) 1-2 sessions   Comment, Therapy Assessment/Plan (SLP) Diet f/u, SL eval if concerns noted    Total Evaluation Time   Total Evaluation Time (Minutes) 15

## 2022-01-14 NOTE — CONSULTS
[facility]      Neurology Stroke Consult    Patient Name: Remedios Vincent  : 1934 MRN#: [id number]    STROKE DATA    Stroke Code:  Stroke code not activated.  Time patient seen:  2022 0800  Last known normal (pt's baseline):  1/10/22 730    TPA treatment:  Not given due to Outside time window with well-established infarct.     National Institutes of Health Stroke Scale (at presentation)  NIHSS done at:  time patient seen      Score    Level of consciousness:  (0)   Alert, keenly responsive     LOC questions:  (0)   Answers both questions correctly    LOC commands:  (0)   Performs both tasks correctly    Best gaze:  (0)   Normal    Visual:  (0)   No visual loss    Facial palsy:  (1)   Minor paralysis (flat nasolabial fold, smile asymmetry)    Motor arm (left):  (0)   No drift    Motor arm (right):  (0)   No drift    Motor leg (left):  (0)   No drift    Motor leg (right):  (0)   No drift    Limb ataxia:  (0)   Absent    Sensory:  (0)   Normal- no sensory loss    Best language:  (0)   Normal- no aphasia    Dysarthria:  (1)   Mild to moderate dysarthria    Extinction and inattention:  (0)   No abnormality        NIHSS Total Score:  2        Dysphagia Screen  Time of screening: Facial droop and dysarthria, n.p.o. until speech eval     ASSESSMENT & RECOMMENDATIONS     The patient was seen for stroke.     Impression:  #Acute ischemic stroke, suspected 2/2 small vessel disease  #Hypertension  #Hyperlipidemia  #Non compliance  #Lambl's excrescences    Ms. Vincent is a 87-year-old female who presents with stroke.  NIH stroke scale two for dysarthria and mild right facial droop.  MRI confirms a small left putaminal stroke.  Etiology of stroke is not entirely clear.  My suspicion is this represents small vessel stroke based on size and location.  However, her stroke work-up did show a few abnormalities.  Most notably her CTA of the head and neck showed significant intracranial athero and  multiple vascular territories.  However in the territory of the left middle cerebral artery it is not the most severe.  Echocardiogram also showed a lambls excercense which can be associated with stroke.  That being said the appearance does not appear embolic or associated with her intracranial athero-.  At this time given these findings would treat a little more aggressively with dual antiplatelets for 3 months, as dual antiplatelets have been proposed as a treatment for the echocardiographic findings.  She also needs to start a high-dose statin.  Would discuss with cardiology if any follow-up for JOSHUA needed to confirm the echocardiographic finding.            Recommendations:  - Permissive HTN, okay to start lowering BP by 10-20%.    - PT/OT/SLP  - NPO until speech evaluation  - Would discuss with cardiology if any follow up needed for lambl's excrescences or if JOSHUA needed to confirm as I suspect this is incidental finding in this case.    -Dual antiplatelets with aspirin and Plavix for 3 months, followed by just high-dose aspirin  -Ordered Plavix load of 300 mg.  -Start atorvastatin 40 mg  -Follow-up in neurology needed on discharge  -Inpatient neurology will sign off at this time      Prophylaxis          For VTE Prevention:  - enoxaparin         HPI  Remedios Vincent is a 87 year old female with history of anxiety, CKD, hypertension, hyperlipidemia who was brought to the emergency room for 4 days of slurred speech.  Patient states she first noticed it this past weekend.  Family noted some changes the day prior to admission, but when persisted recommended to come to the emergency room.  In the emergency room she was found to have an NIH stroke scale of 2.  Head CT showed a probable infarct and subsequent MRI confirmed stroke in the posterior putamen with late acute or early subacute appearance.  Patient denies any previous history of stroke.  She states she is not taking any blood pressure medications despite  lisinopril being prescribed.  She states she is not on aspirin.  Her daughter has had some concerns with her ability to live independently in light of her noncompliance.    Patient states she had a daughter who had a stroke with residual left-sided hemiparesis in her 30s.  Denies any other family history of stroke.  She denies any tobacco, alcohol, or substance abuse.  She states she lives independently in a [facility] in [place].    Currently she states she feels well except she noticed that her speech was slurred.  She denies headache, vision changes, weakness, numbness, bowel or bladder symptoms, fevers, chills, recent travel.    Pertinent Past Medical/Surgical History  Past Medical History:   Diagnosis Date     Anxiety      CKD (chronic kidney disease), stage III (H)      Depression      HLD (hyperlipidemia)      HTN (hypertension)      Hypothyroidism      Lumbar spondylosis      Osteoporosis        Past Surgical History:   Procedure Laterality Date     [facility] REMOVAL OF TONSILS,<13 Y/O      Description: Tonsillectomy;  Recorded: 05/22/2014;     [facility] REMOVAL OF OVARY(S)      Description: Oophorectomy;  Recorded: 05/22/2014;     [facility] TOTAL ABDOM HYSTERECTOMY      Description: Hysterectomy;  Recorded: 05/22/2014;     [facility] TOTAL ABDOM HYSTERECTOMY      Description: Total Abdominal Hysterectomy;  Recorded: 05/22/2014;       Medications: I have reviewed this patient's current medications  As well as medications prior to admission.    Allergies: All allergies reviewed and addressed.    Family History: Family history of a daughter with stroke in her 30s.  No other family history of stroke.  Does have family history of cardiovascular disease..    Social History: Denies any history of tobacco, drug use, alcohol use..      ROS:  The 10 point Review of Systems is negative other than noted in the HPI or here.     PHYSICAL EXAMINATION  Vital Signs:  B/P: 208/95,  T: 98.6,  P: 79,  R: 30    General:  patient lying in bed  without any acute distress    HEENT:  normocephalic/atraumatic  Cardio:  RRR  Pulmonary:  no respiratory distress, No wheezing heard  Abdomen:  soft  Extremities:  no edema  Skin:  intact, warm/dry     Neurologic  Mental Status:  fully alert, attentive and oriented, follows commands, Speech fluent.  Able to name simple objects and read a sentence  Cranial Nerves:  visual fields intact, PERRL, facial sensation intact and symmetric, hearing not formally tested but intact to conversation, palate elevation symmetric and uvula midline, shoulder shrug strong bilaterally, tongue protrusion midline, Extraocular muscles intact but there is poor smooth pursuit.  No spontaneous nystagmus.  Dysarthria present.  Mild right facial droop.  Motor:  no abnormal movements, normal tone throughout, normal muscle bulk, no pronator drift, able to move all limbs spontaneously, strength 5/5 throughout upper and lower extremities, No satelliting with rapid arm roll  Reflexes:  2+ and symmetric throughout, no clonus, toes downgoing  Sensory:  Intact and symmetric to light touch throughout upper and lower extremities.  Proprioception intact.  No sensory extinction.  Coordination:  FNF and HS intact without dysmetria  Station/Gait:  Deferred, while awaiting PT eval    Labs  Labs and Imaging reviewed and used in developing the plan; pertinent results included.     Lab Results   Component Value Date    GLC 82 01/14/2022       Hemoglobin A1c 5.6      MRI brain:IMPRESSION:  1. Subacute ischemia posterior putamen.  2. No evidence of a mass lesion, midline shift or acute hemorrhage.  3.  No abnormal enhancement.  4.  Diffuse age related changes.    CTA Head and neck:  HEAD CTA: No proximal large vessel occlusion. Carotid siphon atherosclerosis without narrowing. Severe stenosis of the distal A2 segment of the left anterior cerebral artery. More mild to moderate multifocal narrowing in the more distal branches of the   anterior cerebral  arteries. Severe focal stenosis of the mid posterior division M2 branch of the right middle cerebral artery. More mild atherosclerosis distal to this. Mild atherosclerosis in the mid M2 segment of the left middle cerebral artery with   moderate to severe narrowing in the distal posterior division M2 branches. Vertebral and basilar arteries are patent. Prominent posterior communicating arteries are the dominant supply to both posterior cerebral arteries which demonstrate multifocal   areas of moderate to severe atherosclerotic narrowing, more pronounced on the right than on the left. The dural venous sinuses are not well visualized due to the arterial timing of the contrast bolus.     NECK CTA: Retroesophageal right subclavian artery.  Carotid arteries are patent with mild atherosclerotic change.  No hemodynamically significant stenosis by NASCET criteria in either carotid system.  Patent vertebral arteries. No dissection.                                                                      CONCLUSION:  HEAD CT:  1.  No intracranial hemorrhage, mass, or definite CT evidence of recent ischemia.  2.  Chronic appearing lacunar infarct in the posterior left putamen is new versus 2016.  3.  Mild atrophy and presumed chronic small vessel ischemic changes.     HEAD CTA:  1.  Multifocal intracranial atherosclerosis most pronounced involving the left anterior, right middle and posterior cerebral arteries.  2.  No proximal large vessel occlusion.  3.  No aneurysm.     NECK CTA:  1.  Mild carotid artery atherosclerosis. No dissection or hemodynamically significant narrowing in the neck by NASCET criteria.       Ashvin Hanna, DO  Neurology

## 2022-01-15 ENCOUNTER — APPOINTMENT (OUTPATIENT)
Dept: PHYSICAL THERAPY | Facility: HOSPITAL | Age: 87
DRG: 066 | End: 2022-01-15
Payer: COMMERCIAL

## 2022-01-15 ENCOUNTER — APPOINTMENT (OUTPATIENT)
Dept: OCCUPATIONAL THERAPY | Facility: HOSPITAL | Age: 87
DRG: 066 | End: 2022-01-15
Payer: COMMERCIAL

## 2022-01-15 VITALS
OXYGEN SATURATION: 94 % | TEMPERATURE: 98.6 F | WEIGHT: 180 LBS | HEIGHT: 65 IN | RESPIRATION RATE: 17 BRPM | BODY MASS INDEX: 29.99 KG/M2 | HEART RATE: 73 BPM | DIASTOLIC BLOOD PRESSURE: 70 MMHG | SYSTOLIC BLOOD PRESSURE: 147 MMHG

## 2022-01-15 LAB — BACTERIA UR CULT: ABNORMAL

## 2022-01-15 PROCEDURE — 250N000013 HC RX MED GY IP 250 OP 250 PS 637: Performed by: EMERGENCY MEDICINE

## 2022-01-15 PROCEDURE — 250N000009 HC RX 250: Performed by: EMERGENCY MEDICINE

## 2022-01-15 PROCEDURE — 99239 HOSP IP/OBS DSCHRG MGMT >30: CPT | Performed by: INTERNAL MEDICINE

## 2022-01-15 PROCEDURE — 97129 THER IVNTJ 1ST 15 MIN: CPT | Mod: GO

## 2022-01-15 PROCEDURE — 97535 SELF CARE MNGMENT TRAINING: CPT | Mod: GO

## 2022-01-15 PROCEDURE — 250N000013 HC RX MED GY IP 250 OP 250 PS 637: Performed by: INTERNAL MEDICINE

## 2022-01-15 PROCEDURE — 96376 TX/PRO/DX INJ SAME DRUG ADON: CPT

## 2022-01-15 PROCEDURE — 250N000013 HC RX MED GY IP 250 OP 250 PS 637: Performed by: PSYCHIATRY & NEUROLOGY

## 2022-01-15 PROCEDURE — 97530 THERAPEUTIC ACTIVITIES: CPT | Mod: GP

## 2022-01-15 RX ORDER — CLOPIDOGREL BISULFATE 75 MG/1
75 TABLET ORAL DAILY
Qty: 30 TABLET | Refills: 0 | Status: SHIPPED | OUTPATIENT
Start: 2022-01-16 | End: 2022-02-09

## 2022-01-15 RX ORDER — METOPROLOL TARTRATE 25 MG/1
25 TABLET, FILM COATED ORAL 2 TIMES DAILY
Qty: 60 TABLET | Refills: 0 | Status: SHIPPED | OUTPATIENT
Start: 2022-01-15 | End: 2022-02-09

## 2022-01-15 RX ORDER — LISINOPRIL 10 MG/1
10 TABLET ORAL DAILY
Qty: 30 TABLET | Refills: 0 | Status: SHIPPED | OUTPATIENT
Start: 2022-01-15 | End: 2022-07-17

## 2022-01-15 RX ORDER — ATORVASTATIN CALCIUM 40 MG/1
40 TABLET, FILM COATED ORAL EVERY EVENING
Qty: 30 TABLET | Refills: 0 | Status: SHIPPED | OUTPATIENT
Start: 2022-01-15 | End: 2022-02-09

## 2022-01-15 RX ORDER — SERTRALINE HYDROCHLORIDE 100 MG/1
100 TABLET, FILM COATED ORAL DAILY
Qty: 30 TABLET | Refills: 0 | Status: SHIPPED | OUTPATIENT
Start: 2022-01-15 | End: 2022-10-26

## 2022-01-15 RX ADMIN — LISINOPRIL 10 MG: 5 TABLET ORAL at 10:43

## 2022-01-15 RX ADMIN — TRAZODONE HYDROCHLORIDE 50 MG: 50 TABLET ORAL at 00:41

## 2022-01-15 RX ADMIN — CLOPIDOGREL BISULFATE 75 MG: 75 TABLET ORAL at 10:42

## 2022-01-15 RX ADMIN — METOPROLOL TARTRATE 25 MG: 25 TABLET, FILM COATED ORAL at 10:45

## 2022-01-15 RX ADMIN — ASPIRIN 81 MG: 81 TABLET, COATED ORAL at 08:58

## 2022-01-15 RX ADMIN — FAMOTIDINE 20 MG: 10 INJECTION, SOLUTION INTRAVENOUS at 09:03

## 2022-01-15 ASSESSMENT — ACTIVITIES OF DAILY LIVING (ADL)
ADLS_ACUITY_SCORE: 8
DEPENDENT_IADLS:: CLEANING;COOKING;LAUNDRY;SHOPPING;MEAL PREPARATION;TRANSPORTATION
ADLS_ACUITY_SCORE: 8

## 2022-01-15 NOTE — PLAN OF CARE
Occupational Therapy Discharge Summary    Reason for therapy discharge:    Discharged to home.Rec. family assist/supervision at home.     Progress towards therapy goal(s). See goals on Care Plan in [facility] electronic health record for goal details.  Goals partially met.  Barriers to achieving goals:   discharge from facility.    Therapy recommendation(s):    No further therapy is recommended.  Mary Reyes, OTR/L  1/15/2022

## 2022-01-15 NOTE — ED NOTES
amb to restroom with asst.  Granddaughter here for discharge.  Spoke with Hannah on phone about plan for home.

## 2022-01-15 NOTE — ED NOTES
"[facility] ED Handoff Report    ED Chief Complaint: Aphasia    ED Diagnosis:  (I63.9) Ischemic stroke (H)  Comment:   Plan:     (Z91.14) Noncompliance with medication regimen  Comment:   Plan:     (I10) Hypertension, unspecified type  Comment:   Plan:        PMH:    Past Medical History:   Diagnosis Date     Anxiety      CKD (chronic kidney disease), stage III (H)      Depression      HLD (hyperlipidemia)      HTN (hypertension)      Hypothyroidism      Lumbar spondylosis      Osteoporosis         Code Status:  Full Code     Falls Risk: Yes Band: Applied    Current Living Situation/Residence: lives alone     Elimination Status: Continent: Yes     Activity Level: SBA    Patients Preferred Language:  English     Needed: No    Vital Signs:  BP (!) 191/91   Pulse 67   Temp 98.6  F (37  C) (Oral)   Resp 17   Ht 1.651 m (5' 5\")   Wt 81.6 kg (180 lb)   SpO2 96%   BMI 29.95 kg/m       Cardiac Rhythm: Sinus Rhythm    Pain Score: 0/10    Is the Patient Confused:  No    Last Food or Drink: 1/14/22 at     Focused Assessment:  Patient grandson noticed slurred speech, right facial droop.  Patient has generalized weakness. Alert x 2.  Patient is Assist of one.  Fall and Aspiration precaution.     Tests Performed: Done: Labs and Imaging    Treatments Provided:      Family Dynamics/Concerns: No    Family Updated On Visitor Policy: Yes    Plan of Care Communicated to Family: Yes    Who Was Updated about Plan of Care: daughter    Belongings Checklist Done and Signed by Patient: Yes    Medications sent with patient:     Covid: asymptomatic , negative    Additional Information:     RN: Halie Eagle   1/15/2022 6:41 AM     "

## 2022-01-15 NOTE — ED NOTES
Spoke with Karla (daughter) [phone] about plan and update.  She would like info about setting up home health for pt.  Pt resting now with eyes closed.  nad

## 2022-01-15 NOTE — DISCHARGE SUMMARY
[facility] MEDICINE  DISCHARGE SUMMARY     Primary Care Physician: Edin Dailey  Admission Date: 1/13/2022   Discharge Provider: CLAUDY Brown Discharge Date: 1/15/2022   Diet: Low salt diet   Code Status: Full Code   Activity: DCACTIVITY: Activity as tolerated        Condition at Discharge: Stable     REASON FOR PRESENTATION(See Admission Note for Details)   Slurred speech    PRINCIPAL & ACTIVE DISCHARGE DIAGNOSES     Active Problems:    Ischemic stroke (H)      PENDING LABS     Unresulted Labs Ordered in the Past 30 Days of this Admission     Date and Time Order Name Status Description    1/13/2022  7:14 PM Urine Culture Preliminary             PROCEDURES ( this hospitalization only)          RECOMMENDATIONS TO OUTPATIENT PROVIDER FOR F/U VISIT     Follow-up Appointments     Follow-up and recommended labs and tests       Follow up with primary care provider, Edin Dailey, within 7 days                   DISPOSITION     Home with [facility]. Pateint adamantly refused TCUs    SUMMARY OF HOSPITAL COURSE:       88 YO female with h/o hypertension, hyperlipidemia, CKD-III, medication non-compliance who was brought to our ED or evaluation of slurred speech     Subacute CVA:   -- MRI shows subacute ischemia posterior putamen.    -- Given aspirin, ischemic stroke set ordered, neurology consulted.  Lipid panel reviewed, started on statin.    -- ASA and Plavix per neurology. Got a loading dose of plavix yesterday. Neurology recommends DAPT for 3 months, then she can be just on high dose aspirin. Appreciate the consult.  -- PT/OT consulted, echocardiogram reviewed  -- Passed swallow study     Hypertensive urgency:   -- Blood pressures have been as high as 232/131.  Receiving IV beta-blocker in the ED with transient lowering.  She has not been compliant with her home blood pressure medications.   -- Resumed lisinopril. Added metoprolol 25mg BID.      Abnormal echo:  -- Echo  showed aortic valve Lambl's excrescences  -- Consulted cardiology. Nothing specific to be done about it      Hypothyroidism:   -- Patient never started synthroid per report. TSH- normal. Free T4 was normal in July 2021     History of anxiety disorder/depression:   -- Supposed to be on Zoloft, she apparently has been noncompliant       Patient was worked up this morning, adamant about going home. She would not take any meds if we are not discharging her home. Refused all her morning medications. At one point her BP was 217/105. BP is now coming down. Agreed to take her morning BP meds after I told her that she can go home if BP come down. Patient is alert and oriented. She doesn't want to go to TCU, and doesn't want to stay in the hospital any longer (she has been in the hallway stretcher bed for the last 2 days due to lack of bed, and probably didn't get much sleep). I had some reservations about her going home. Therefore, asked for PT eval. PT is ok with home discharge with [facility]. I spoke with her daughter, Cassia and updated her about her mother's condition. She is aware that the [facility] would like not see her on the weekend.       Discharge Medications with Med changes:     Current Discharge Medication List      START taking these medications    Details   aspirin (ASA) 81 MG EC tablet Take 1 tablet (81 mg) by mouth daily  Qty: 30 tablet, Refills: 0    Associated Diagnoses: Ischemic stroke (H)      atorvastatin (LIPITOR) 40 MG tablet Take 1 tablet (40 mg) by mouth every evening  Qty: 30 tablet, Refills: 0    Associated Diagnoses: Ischemic stroke (H)      clopidogrel (PLAVIX) 75 MG tablet Take 1 tablet (75 mg) by mouth daily  Qty: 30 tablet, Refills: 0    Associated Diagnoses: Ischemic stroke (H)      metoprolol tartrate (LOPRESSOR) 25 MG tablet Take 1 tablet (25 mg) by mouth 2 times daily  Qty: 60 tablet, Refills: 0    Associated Diagnoses: Ischemic stroke (H)         CONTINUE these medications which have CHANGED     Details   lisinopril (ZESTRIL) 10 MG tablet Take 1 tablet (10 mg) by mouth daily  Qty: 30 tablet, Refills: 0    Associated Diagnoses: Essential hypertension      sertraline (ZOLOFT) 100 MG tablet Take 1 tablet (100 mg) by mouth daily  Qty: 30 tablet, Refills: 0    Associated Diagnoses: Mood disorder (H)         STOP taking these medications       levothyroxine (SYNTHROID/LEVOTHROID) 50 MCG tablet Comments:   Reason for Stopping:         traZODone (DESYREL) 50 MG tablet Comments:   Reason for Stopping:                     Rationale for medication changes:      Please see the discharge summary        Consults   Neurology and Cardiology      Immunizations given this encounter     Most Recent Immunizations   Administered Date(s) Administered     COVID-19,PF,Pfizer (12+ Yrs) 12/04/2021     Flu, Unspecified 11/01/2013     Influenza (High Dose) 3 valent vaccine 09/17/2018     Influenza Vaccine, 6+MO IM (QUADRIVALENT W/PRESERVATIVES) 11/04/2019     Influenza, Quad, High Dose, Pf, 65yr+ (Fluzone HD) 12/04/2021     Pneumo Conj 13-V (2010&after) 03/14/2016     Pneumococcal 23 valent 12/08/2017     TD (ADULT, 7+) 03/13/2011     Td,adult,historic,unspecified 04/06/2009     Zoster vaccine, live 08/19/2008           Anticoagulation Information      Recent INR results:   Recent Labs   Lab 01/13/22  1149   INR 1.02     Warfarin doses (if applicable) or name of other anticoagulant: NA      SIGNIFICANT IMAGING FINDINGS     Results for orders placed or performed during the hospital encounter of 01/13/22   XR Chest Port 1 View    Impression    IMPRESSION: Shallow inspiration. Mild elevation right hemidiaphragm. No focal pulmonary consolidation or visible pleural fluid. Heart size and pulmonary vascularity within normal limits. Thoracolumbar curvature. No significant change.   CTA Head Neck with Contrast    Impression    CONCLUSION:  HEAD CT:  1.  No intracranial hemorrhage, mass, or definite CT evidence of recent ischemia.  2.  Chronic  appearing lacunar infarct in the posterior left putamen is new versus 2016.  3.  Mild atrophy and presumed chronic small vessel ischemic changes.    HEAD CTA:  1.  Multifocal intracranial atherosclerosis most pronounced involving the left anterior, right middle and posterior cerebral arteries.  2.  No proximal large vessel occlusion.  3.  No aneurysm.    NECK CTA:  1.  Mild carotid artery atherosclerosis. No dissection or hemodynamically significant narrowing in the neck by NASCET criteria.       MR Brain w/o & w Contrast    Impression    IMPRESSION:  1. Subacute ischemia posterior putamen.  2. No evidence of a mass lesion, midline shift or acute hemorrhage.  3.  No abnormal enhancement.  4.  Diffuse age related changes.    These findings were communicated by phone to Dr. Rocha at 5:45 AM on 01/13/2022.       SIGNIFICANT LABORATORY FINDINGS     Most Recent 3 CBC's:Recent Labs   Lab Test 01/14/22  0843 01/13/22  1149 07/13/21  1759   WBC 6.8 4.9 5.8   HGB 13.7 13.5 12.0   MCV 92 94 90    212 211     Most Recent 3 BMP's:Recent Labs   Lab Test 01/14/22  0843 01/14/22  0447 01/13/22  2330 01/13/22  1149 07/13/21  1759     --   --  140 139   POTASSIUM 4.2  --   --  3.9 4.4   CHLORIDE 105  --   --  105 104   CO2 23  --   --  25 23   BUN 20  --   --  20 25   CR 0.96  --   --  1.09 0.96   ANIONGAP 10  --   --  10 12   LUBNA 9.3  --   --  9.2 8.6   GLC 82 83 77 119 97         Discharge Orders        Home care nursing referral      Home Care PT Referral for Hospital Discharge      Home Care OT Referral for Hospital Discharge      Reason for your hospital stay    Subacute stroke     Follow-up and recommended labs and tests     Follow up with primary care provider, Edin Dailey, within 7 days     Activity    Your activity upon discharge: activity as tolerated     MD face to face encounter    Documentation of Face to Face and Certification for Home Health Services    I certify that patient: Remedios Vincent is  under my care and that I, or a nurse practitioner or physician's assistant working with me, had a face-to-face encounter that meets the physician face-to-face encounter requirements with this patient on: 1/15/2022  .    This encounter with the patient was in whole, or in part, for the following medical condition, which is the primary reason for home health care: Ischemic stroke.    I certify that, based on my findings, the following services are medically necessary home health services: Nursing, Occupational Therapy, and Physical Therapy.    My clinical findings support the need for the above services because: Nurse is needed: To provide assessment and oversight required in the home to assure adherence to the medical plan due to: non-compliance, Occupational Therapy Services are needed to assess and treat cognitive ability and address ADL safety due to impairment in Fait., and Physical Therapy Services are needed to assess and treat the following functional impairments: Weakness.    Further, I certify that my clinical findings support that this patient is homebound (i.e. absences from home require considerable and taxing effort and are for medical reasons or [employer] services or infrequently or of short duration when for other reasons) because: Unable to drive.    Based on the above findings. I certify that this patient is confined to the home and needs intermittent skilled nursing care, physical therapy and/or speech therapy.  The patient is under my care, and I have initiated the establishment of the plan of care.  This patient will be followed by a physician who will periodically review the plan of care.  Physician/Provider to provide follow up care: Edin Dailey    Attending hospital physician (the Medicare certified [place] provider): CLAUDY Brown  Physician Signature: See electronic signature associated with these discharge orders.  Date: 1/15/2022     Diet    Follow this diet upon discharge:  "Orders Placed This Encounter      Regular Diet Adult       Examination   BP (!) 156/96   Pulse 79   Temp 98.6  F (37  C) (Oral)   Resp 17   Ht 1.651 m (5' 5\")   Wt 81.6 kg (180 lb)   SpO2 97%   BMI 29.95 kg/m      General: Not in obvious distress.  HEENT: NC, AT   Chest: Clear to auscultation bilaterally  Heart: S1S2 normal, regular. No M/R/G  Abdomen: Soft. NT, ND. Bowel sounds- active.  Extremities: No legs swelling  Neuro: Alert and awake, grossly non-focal. Dysarthria is better  Please see EMR for more detailed significant labs, imaging, consultant notes etc.    I, CLAUDY Brown, personally saw the patient today and spent greater than 30 minutes discharging this patient.    CLAUDY Brown  [employer]    CC:Edin Dailey    "

## 2022-01-15 NOTE — ED NOTES
Patient wishing to leave. Spoke to Dr. Friedman, he would like PT eval to assess patients ability to return home safely. Patient refused PT this morning, but is agreeable at this time as it was explained this would help with discharge planning. Wallace from PT contacted who stated he would re-attempt eval.

## 2022-01-15 NOTE — ED NOTES
Care manager was able to set up care with accent. Granddaughter is coming to pick patient up and will call once she arrives.

## 2022-01-15 NOTE — ED NOTES
"Pt climbing out of bed.  When advised to stay in bed pt shouted \"NO! I'm going to put my pants on.  Leave me alone.  I don't want your help.\"  Offered warm blanket but pt declined.  Pt standing at the end of bed.  Unsteady but holding on to rail as needed.  asst'd back into bed when done getting her pants on.  Awaits bed assignment on floor.   "

## 2022-01-15 NOTE — PLAN OF CARE
Physical Therapy Discharge Summary    Reason for therapy discharge:    Patient/family request discontinuation of services.    Progress towards therapy goal(s). See goals on Care Plan in [facility] electronic health record for goal details.  Goals partially met.  Barriers to achieving goals:   Pt requests no further PT.    Therapy recommendation(s):    Continued therapy is recommended.  Rationale/Recommendations:  For safety, independence, full return to PLOF..       I attempted to encourage pt to participate in therapy to assess mobility and activity tolerance, but she declined. She reported she had been ambulating safely with a walker to and from the bathroom. RN had confirmed this. I spent some time answering pt's questions. She is very eager to go home, asked writer several times when she could go. Informed pt that this is up to the medical team and they would also look to my clearance as well. She did eventually participate and ambulated 150 ft with 4WW, SBA, safe and steady. Very much wants to go home. I am OK with this plan, but would recommend HHPT for further assessment if pt is agreeable.

## 2022-01-15 NOTE — ED NOTES
Pt gets up without assistance and refusing to ask for help.  Refusing to take ordered meds.  States I'll go see my doctor when I get a chance when I go home.  Attending paged regarding pt's refusal of meds and cares and desire to go home.

## 2022-01-15 NOTE — CONSULTS
Care Management Initial Consult    General Information  Assessment completed with: Patient,Children, pt and dtr Flower  Type of CM/SW Visit: Initial Assessment    Primary Care Provider verified and updated as needed: Yes   Readmission within the last 30 days: no previous admission in last 30 days   Return Category: Progression of disease  Reason for Consult: discharge planning,community resources  Advance Care Planning: Advance Care Planning Reviewed: no concerns identified     General Information Comments: pt lives alone and needs a litle help at home,     Communication Assessment  Patient's communication style: spoken language (English or Bilingual)    Hearing Difficulty or Deaf: no   Wear Glasses or Blind: yes    Cognitive  Cognitive/Neuro/Behavioral: WDL  Level of Consciousness: alert  Arousal Level: opens eyes spontaneously  Orientation: oriented x 4  Mood/Behavior: calm,cooperative  Best Language: 0 - No aphasia  Speech: fluent    Living Environment:   People in home: alone     Current living Arrangements: apartment      Able to return to prior arrangements: yes       Family/Social Support:  Care provided by: self,child(lula)  Provides care for: no one  Marital Status:   Children          Description of Support System: Supportive,Involved    Support Assessment: Adequate social supports,Adequate family and caregiver support    Current Resources:   Patient receiving home care services: No     Community Resources: DME  Equipment currently used at home: walker, rolling  Supplies currently used at home: None    Employment/Financial:  Employment Status:          Financial Concerns: No concerns identified   Referral to Financial Counselor: No       Lifestyle & Psychosocial Needs:  Social Determinants of Health     Tobacco Use: Low Risk      Smoking Tobacco Use: Never Smoker     Smokeless Tobacco Use: Never Used   Alcohol Use: Not on file   Financial Resource Strain: Not on file   Food Insecurity: Not on file    Transportation Needs: Not on file   Physical Activity: Not on file   Stress: Not on file   Social Connections: Not on file   Intimate Partner Violence: Not on file   Depression: Not at risk     PHQ-2 Score: 0   Housing Stability: Not on file       Functional Status:  Prior to admission patient needed assistance:   Dependent ADLs:: Ambulation-walker,Eating,Dressing,Bathing  Dependent IADLs:: Cleaning,Cooking,Laundry,Shopping,Meal Preparation,Transportation  Assesssment of Functional Status: Not at baseline with ADL Functioning,Not at baseline with mobility,Not at  functional baseline    Mental Health Status:  Mental Health Status: No Current Concerns       Chemical Dependency Status:                Values/Beliefs:  Spiritual, Cultural Beliefs, Holiness Practices, Values that affect care:                 Additional Information:  JOSÉ MIGUEL assessed, lives alone and needs homecare RN/PT/OT at discharge. Pt is independent w/walker up to about 6 months ago and has not been eating well and getting weak. Spoke to dtr Flower and they are all taking turns to help her w/cares but patient declines a lot of help and not taking her meds. [facility], granddaughter Jil to , Flower will help w/organizing cares w/[facility]. They are ok w/midweek start of care.    Please megan Flower for homecare start of care and set up appointments.    Dylon Cuba RN

## 2022-01-17 ENCOUNTER — PATIENT OUTREACH (OUTPATIENT)
Dept: CARE COORDINATION | Facility: CLINIC | Age: 87
End: 2022-01-17
Payer: COMMERCIAL

## 2022-01-17 DIAGNOSIS — Z71.89 OTHER SPECIFIED COUNSELING: ICD-10-CM

## 2022-01-17 NOTE — PROGRESS NOTES
"Clinic Care Coordination Contact  [facility]: Post-Discharge Note  SITUATION                                                      Admission:    Admission Date: 01/13/22   Reason for Admission: Aphasia  Discharge:   Discharge Date: 01/15/22  Discharge Diagnosis: Ischemic stroke    BACKGROUND                                                      Remedios Vincent is a 87 year old female with a pertient medical history of hypertension, hyperlipidemia, depression, and anxiety who presents to the ED for evaluation of slurred speech.     Per EMS report via RN, the patient lives independently. The patient's grandson visited her yesterday and noticed that her speech was slurred. Her daughter then went to go visit her today and also noticed that the patient's speech was slurred and daughter urged her to come to the ED today. Blood pressure was 220/115 for EMS.     The patient reports feeling generally weak and she did notice her speech slurring yesterday. The patient notes that she has never experienced this before. Denies headaches, numbness, tingling. Currently she states that her speech is normal now and that she feels \"okay.\" The patient reports that she does not think she has ever been on any medications for high blood pressure. Patient is on Trazodone for sleep. No other symptoms or complaints at this time.       ASSESSMENT      Enrollment  Primary Care Care Coordination Status: Declined    Discharge Assessment  How are you doing now that you are home?: \" I'm doing well \"  How are your symptoms? (Red Flag symptoms escalate to triage hotline per guidelines): Improved  Do you feel your condition is stable enough to be safe at home until your provider visit?: Yes  Does the patient have their discharge instructions? : Yes  Does the patient have questions regarding their discharge instructions? : No  Were you started on any new medications or were there changes to any of your previous medications? : Yes  Does the " patient have all of their medications?: Yes  Do you have questions regarding any of your medications? : No  Do you have all of your needed medical supplies or equipment (DME)?  (i.e. oxygen tank, CPAP, cane, etc.): Yes  Discharge follow-up appointment scheduled within 14 calendar days? : Yes  Discharge Follow Up Appointment Date: 01/24/22  Discharge Follow Up Appointment Scheduled with?: Primary Care Provider    Post-op ([facility] Only)  If the patient had a surgery or procedure, do they have any questions for a nurse?: No             PLAN                                                      Outpatient Plan: Follow up    Future Appointments   Date Time Provider Department Center   1/24/2022  1:00 PM Edin Dailey MD [facility]         For any urgent concerns, please contact our 24 hour nurse triage line: [phone] ([phone])         Constance Caro MA

## 2022-01-19 ENCOUNTER — MEDICAL CORRESPONDENCE (OUTPATIENT)
Dept: HEALTH INFORMATION MANAGEMENT | Facility: CLINIC | Age: 87
End: 2022-01-19
Payer: COMMERCIAL

## 2022-01-19 ENCOUNTER — TELEPHONE (OUTPATIENT)
Dept: FAMILY MEDICINE | Facility: CLINIC | Age: 87
End: 2022-01-19
Payer: COMMERCIAL

## 2022-01-19 PROCEDURE — G0180 MD CERTIFICATION HHA PATIENT: HCPCS | Performed by: FAMILY MEDICINE

## 2022-01-19 NOTE — TELEPHONE ENCOUNTER
Reason for Call:  Home Health Care    Ofelia with [facility] called regarding (reason for call): verbal orders    Orders are needed for this patient. PT, OT, Skilled Nursing, Social Work and Homehealth Aide    PT: Eval and treat for strengthening and endurance    OT: Eval and treat for home safety and cognition    Skilled Nursinx a week for 4 weeks, 1x a week for 2 weeks, 4 as needed for med management, education, disease management and fall prevention     Medical Social Work for long term planning.    Home Health Aide for 1x a week for 4 weeks for safe bathing by herself    Pt Provider: Dr. Dailey    Phone Number Homecare Nurse can be reached at: [phone]    Can we leave a detailed message on this number? YES - it is a confidential line      Call taken on 2022 at 2:27 PM by Scott Bonilla

## 2022-01-24 ENCOUNTER — OFFICE VISIT (OUTPATIENT)
Dept: FAMILY MEDICINE | Facility: CLINIC | Age: 87
End: 2022-01-24
Payer: COMMERCIAL

## 2022-01-24 VITALS
DIASTOLIC BLOOD PRESSURE: 89 MMHG | RESPIRATION RATE: 16 BRPM | HEART RATE: 64 BPM | BODY MASS INDEX: 25.16 KG/M2 | WEIGHT: 151.2 LBS | SYSTOLIC BLOOD PRESSURE: 151 MMHG | TEMPERATURE: 97.5 F | OXYGEN SATURATION: 96 %

## 2022-01-24 DIAGNOSIS — N18.31 STAGE 3A CHRONIC KIDNEY DISEASE (H): ICD-10-CM

## 2022-01-24 DIAGNOSIS — I63.9 ISCHEMIC STROKE (H): Primary | ICD-10-CM

## 2022-01-24 PROCEDURE — 99213 OFFICE O/P EST LOW 20 MIN: CPT | Performed by: FAMILY MEDICINE

## 2022-01-24 NOTE — PATIENT INSTRUCTIONS
Remedios,    It was good to see you and Clare  I recommend that you start aspirin 81 mg a day as recommended by the neurologist  Continue Plavix  Continue to take your blood pressure medicines  You may take trazodone one half of the 50 mg pill at night.  Make sure that does not make you too groggy  I would like to see you in the clinic for a visit in 2-3 months.  We will do laboratory testing then.  In the meantime, make sure to take all of your medications.  Please let me know if you experience any problems.    Edin Dailey MD

## 2022-01-26 DIAGNOSIS — Z53.9 DIAGNOSIS NOT YET DEFINED: Primary | ICD-10-CM

## 2022-01-26 PROCEDURE — G0180 MD CERTIFICATION HHA PATIENT: HCPCS | Performed by: FAMILY MEDICINE

## 2022-01-31 ENCOUNTER — TELEPHONE (OUTPATIENT)
Dept: FAMILY MEDICINE | Facility: CLINIC | Age: 87
End: 2022-01-31
Payer: COMMERCIAL

## 2022-01-31 NOTE — PROGRESS NOTES
Assessment/ Plan     1. Ischemic stroke (H)    Reviewed the hospitalization notes and discharge summary   She has been treated with Plavix as advised to continue aspirin 81 mg as well  Continue atorvastatin   Continue lisinopril and metoprolol  Follow-up with neurology as planned    2. Stage 3a chronic kidney disease (H)    Continue to monitor laboratory testing  Patient should avoid NSAIDs, specially given that she is treated with Plavix and aspirin  Recommend good control of blood pressure    Reviewed hospital discharge summary and laboratory testing as well as MRI and CT results    Post Discharge Medication Reconciliation Status: discharge medications reconciled, continue medications without change.        Subjective:      Remedios Vincent is a 87 year old female who presents to the clinic following a recent hospitalization for a stroke. She began to experience a constellation of symptoms including slurred speech and was noted to have a right facial droop. She states that she felt like her tongue was fat and she experienced drooling. Unfortunately, she had the symptoms over the course of a day. The following day she was encouraged to call 911 and was taken to the hospital by paramedics where an MRI was abnormal and revealed subacute ischemia involving the posterior putamen. Neurology was consulted and he was admitted for observation. She was started on aspirin as well as Plavix. For blood pressure she has been treated with metoprolol and lisinopril. Atorvastatin was also started. She does have history of hypertension but unfortunately was not taking her blood pressure medication as recommended. She follows up today stating that she is feeling better. She still has some mild facial weakness. She denies significant weakness in her extremities. She denies chest pain or shortness of breath. She will follow up physical therapy.    One significant concern is that her sleep has been quite poor. Trazodone was discontinued  and she would like start that if possible as she has not been sleeping well but she has tolerated that previously without difficulty.    She continues to live alone and has good family support. She would like to continue to live independently and her daughter will continue to check on her.    MRI Brain:    IMPRESSION:  1. Subacute ischemia posterior putamen.  2. No evidence of a mass lesion, midline shift or acute hemorrhage.  3.  No abnormal enhancement.  4.  Diffuse age related changes.       The following portions of the patient's history were reviewed and updated as appropriate: allergies, current medications, past family history, past medical history, past social history, past surgical history and problem list. Medications have been reconciled    Review of Systems   A 12 point comprehensive review of systems was negative except as noted.      Current Outpatient Medications   Medication Sig Dispense Refill     aspirin (ASA) 81 MG EC tablet Take 1 tablet (81 mg) by mouth daily 30 tablet 0     atorvastatin (LIPITOR) 40 MG tablet Take 1 tablet (40 mg) by mouth every evening 30 tablet 0     clopidogrel (PLAVIX) 75 MG tablet Take 1 tablet (75 mg) by mouth daily 30 tablet 0     lisinopril (ZESTRIL) 10 MG tablet Take 1 tablet (10 mg) by mouth daily 30 tablet 0     metoprolol tartrate (LOPRESSOR) 25 MG tablet Take 1 tablet (25 mg) by mouth 2 times daily 60 tablet 0     sertraline (ZOLOFT) 100 MG tablet Take 1 tablet (100 mg) by mouth daily 30 tablet 0       Objective:     BP (!) 151/89   Pulse 64   Temp 97.5  F (36.4  C) (Oral)   Resp 16   Wt 68.6 kg (151 lb 3.2 oz)   SpO2 96%   BMI 25.16 kg/m      General appearance: alert, appears stated age   Head: There is a mild right facial droop  Eyes: conjunctivae/corneas clear. PERRL, EOM's intact.   Nose: nares normal. Septum midline. Mucosa normal.  Throat: lips, mucosa, and tongue normal; teeth and gums normal  Neck: no adenopathy, supple, symmetrical, trachea midline  and thyroid not enlarged, symmetric   Lungs: clear to auscultation bilaterally  Heart: regular rate and rhythm, S1, S2 normal, no murmur, click, rub or gallop  Abdomen: soft, non-tender  Extremities: extremities normal, atraumatic, no cyanosis or edema  Skin: skin color, texture, turgor normal  Lymph nodes: Cervical nodes normal.  Neurologic: Alert and oriented X 3  There is a mild right facial droop   strength on the right is mildly reduced  She has mild reduction in strength 5 -/5 in testing the right biceps as well           No results found for this or any previous visit (from the past 168 hour(s)).       This note has been dictated using voice recognition software. Any grammatical or context distortions are unintentional and inherent to the software    Edin Dailey MD

## 2022-01-31 NOTE — TELEPHONE ENCOUNTER
Reason for Call:  Home Health Care    Tamiko with [facility] called regarding (reason for call): verbal order    Orders are needed for this patient. OT    PT: N/A    OT: 2x2wk and 1x2wk    Skilled Nursing: N/A    Pt Provider: Dr. Dailey    Phone Number Homecare Nurse can be reached at: [phone]    Can we leave a detailed message on this number? YES - it is a secure #    Call taken on 1/31/2022 at 11:34 AM by Scott Bonilla

## 2022-02-02 ENCOUNTER — MEDICAL CORRESPONDENCE (OUTPATIENT)
Dept: HEALTH INFORMATION MANAGEMENT | Facility: CLINIC | Age: 87
End: 2022-02-02
Payer: COMMERCIAL

## 2022-02-09 ENCOUNTER — MEDICAL CORRESPONDENCE (OUTPATIENT)
Dept: HEALTH INFORMATION MANAGEMENT | Facility: CLINIC | Age: 87
End: 2022-02-09
Payer: COMMERCIAL

## 2022-02-09 ENCOUNTER — TELEPHONE (OUTPATIENT)
Dept: FAMILY MEDICINE | Facility: CLINIC | Age: 87
End: 2022-02-09
Payer: COMMERCIAL

## 2022-02-09 DIAGNOSIS — Z53.9 DIAGNOSIS NOT YET DEFINED: Primary | ICD-10-CM

## 2022-02-09 NOTE — TELEPHONE ENCOUNTER
Karena with [facility] calling to let Dr Dailey know that the patient is refusing PT and OT.  They are cancelling those orders but skilled nursing will still be used.

## 2022-02-15 ENCOUNTER — MEDICAL CORRESPONDENCE (OUTPATIENT)
Dept: HEALTH INFORMATION MANAGEMENT | Facility: CLINIC | Age: 87
End: 2022-02-15
Payer: COMMERCIAL

## 2022-03-29 ENCOUNTER — HOSPITAL ENCOUNTER (INPATIENT)
Facility: HOSPITAL | Age: 87
LOS: 2 days | Discharge: HOME HEALTH | DRG: 392 | End: 2022-03-31
Attending: EMERGENCY MEDICINE | Admitting: INTERNAL MEDICINE
Payer: COMMERCIAL

## 2022-03-29 ENCOUNTER — APPOINTMENT (OUTPATIENT)
Dept: CT IMAGING | Facility: HOSPITAL | Age: 87
DRG: 392 | End: 2022-03-29
Attending: EMERGENCY MEDICINE
Payer: COMMERCIAL

## 2022-03-29 DIAGNOSIS — D51.9 ANEMIA DUE TO VITAMIN B12 DEFICIENCY, UNSPECIFIED B12 DEFICIENCY TYPE: Primary | ICD-10-CM

## 2022-03-29 DIAGNOSIS — N39.0 URINARY TRACT INFECTION WITHOUT HEMATURIA, SITE UNSPECIFIED: ICD-10-CM

## 2022-03-29 DIAGNOSIS — D52.9 ANEMIA DUE TO FOLIC ACID DEFICIENCY, UNSPECIFIED DEFICIENCY TYPE: ICD-10-CM

## 2022-03-29 DIAGNOSIS — R53.81 MALAISE: ICD-10-CM

## 2022-03-29 DIAGNOSIS — D64.9 ANEMIA, UNSPECIFIED TYPE: ICD-10-CM

## 2022-03-29 DIAGNOSIS — K52.9 GASTROENTERITIS: ICD-10-CM

## 2022-03-29 DIAGNOSIS — I63.9 ISCHEMIC STROKE (H): ICD-10-CM

## 2022-03-29 DIAGNOSIS — N18.31 STAGE 3A CHRONIC KIDNEY DISEASE (H): ICD-10-CM

## 2022-03-29 DIAGNOSIS — D50.9 IRON DEFICIENCY ANEMIA, UNSPECIFIED IRON DEFICIENCY ANEMIA TYPE: ICD-10-CM

## 2022-03-29 LAB
ABO/RH(D): NORMAL
ABO/RH(D): NORMAL
ALBUMIN SERPL-MCNC: 3.3 G/DL (ref 3.5–5)
ALBUMIN UR-MCNC: NEGATIVE MG/DL
ALP SERPL-CCNC: 53 U/L (ref 45–120)
ALT SERPL W P-5'-P-CCNC: <9 U/L (ref 0–45)
ANION GAP SERPL CALCULATED.3IONS-SCNC: 7 MMOL/L (ref 5–18)
ANTIBODY SCREEN: NEGATIVE
APPEARANCE UR: CLEAR
AST SERPL W P-5'-P-CCNC: 14 U/L (ref 0–40)
BACTERIA #/AREA URNS HPF: ABNORMAL /HPF
BILIRUB SERPL-MCNC: 0.3 MG/DL (ref 0–1)
BILIRUB UR QL STRIP: NEGATIVE
BUN SERPL-MCNC: 17 MG/DL (ref 8–28)
CALCIUM SERPL-MCNC: 7.9 MG/DL (ref 8.5–10.5)
CHLORIDE BLD-SCNC: 107 MMOL/L (ref 98–107)
CO2 SERPL-SCNC: 23 MMOL/L (ref 22–31)
COLOR UR AUTO: ABNORMAL
CREAT SERPL-MCNC: 0.93 MG/DL (ref 0.6–1.1)
ERYTHROCYTE [DISTWIDTH] IN BLOOD BY AUTOMATED COUNT: 14.5 % (ref 10–15)
GFR SERPL CREATININE-BSD FRML MDRD: 59 ML/MIN/1.73M2
GLUCOSE BLD-MCNC: 120 MG/DL (ref 70–125)
GLUCOSE UR STRIP-MCNC: NEGATIVE MG/DL
HCT VFR BLD AUTO: 29 % (ref 35–47)
HGB BLD-MCNC: 8.8 G/DL (ref 11.7–15.7)
HGB UR QL STRIP: NEGATIVE
HOLD SPECIMEN: NORMAL
INR PPP: 1.03 (ref 0.85–1.15)
KETONES UR STRIP-MCNC: NEGATIVE MG/DL
LACTATE SERPL-SCNC: 0.8 MMOL/L (ref 0.7–2)
LEUKOCYTE ESTERASE UR QL STRIP: ABNORMAL
LIPASE SERPL-CCNC: 47 U/L (ref 0–52)
MAGNESIUM SERPL-MCNC: 2 MG/DL (ref 1.8–2.6)
MCH RBC QN AUTO: 27.5 PG (ref 26.5–33)
MCHC RBC AUTO-ENTMCNC: 30.3 G/DL (ref 31.5–36.5)
MCV RBC AUTO: 91 FL (ref 78–100)
MUCOUS THREADS #/AREA URNS LPF: PRESENT /LPF
NITRATE UR QL: POSITIVE
PH UR STRIP: 8 [PH] (ref 5–7)
PLATELET # BLD AUTO: 227 10E3/UL (ref 150–450)
POTASSIUM BLD-SCNC: 4.4 MMOL/L (ref 3.5–5)
PROT SERPL-MCNC: 6.3 G/DL (ref 6–8)
RBC # BLD AUTO: 3.2 10E6/UL (ref 3.8–5.2)
RBC URINE: <1 /HPF
SARS-COV-2 RNA RESP QL NAA+PROBE: NEGATIVE
SODIUM SERPL-SCNC: 137 MMOL/L (ref 136–145)
SP GR UR STRIP: 1.02 (ref 1–1.03)
SPECIMEN EXPIRATION DATE: NORMAL
SPECIMEN EXPIRATION DATE: NORMAL
SQUAMOUS EPITHELIAL: 1 /HPF
TRANSITIONAL EPI: 1 /HPF
UROBILINOGEN UR STRIP-MCNC: <2 MG/DL
WBC # BLD AUTO: 7.7 10E3/UL (ref 4–11)
WBC CLUMPS #/AREA URNS HPF: PRESENT /HPF
WBC URINE: 11 /HPF

## 2022-03-29 PROCEDURE — 250N000011 HC RX IP 250 OP 636: Performed by: EMERGENCY MEDICINE

## 2022-03-29 PROCEDURE — 36415 COLL VENOUS BLD VENIPUNCTURE: CPT | Performed by: EMERGENCY MEDICINE

## 2022-03-29 PROCEDURE — 80053 COMPREHEN METABOLIC PANEL: CPT | Performed by: EMERGENCY MEDICINE

## 2022-03-29 PROCEDURE — 120N000001 HC R&B MED SURG/OB

## 2022-03-29 PROCEDURE — 99285 EMERGENCY DEPT VISIT HI MDM: CPT | Mod: 25

## 2022-03-29 PROCEDURE — 96365 THER/PROPH/DIAG IV INF INIT: CPT

## 2022-03-29 PROCEDURE — 86901 BLOOD TYPING SEROLOGIC RH(D): CPT | Performed by: EMERGENCY MEDICINE

## 2022-03-29 PROCEDURE — 96361 HYDRATE IV INFUSION ADD-ON: CPT

## 2022-03-29 PROCEDURE — C9803 HOPD COVID-19 SPEC COLLECT: HCPCS

## 2022-03-29 PROCEDURE — 258N000003 HC RX IP 258 OP 636: Performed by: INTERNAL MEDICINE

## 2022-03-29 PROCEDURE — 81001 URINALYSIS AUTO W/SCOPE: CPT | Performed by: EMERGENCY MEDICINE

## 2022-03-29 PROCEDURE — 74177 CT ABD & PELVIS W/CONTRAST: CPT

## 2022-03-29 PROCEDURE — 85027 COMPLETE CBC AUTOMATED: CPT | Performed by: EMERGENCY MEDICINE

## 2022-03-29 PROCEDURE — 86923 COMPATIBILITY TEST ELECTRIC: CPT | Performed by: FAMILY MEDICINE

## 2022-03-29 PROCEDURE — 83735 ASSAY OF MAGNESIUM: CPT | Performed by: EMERGENCY MEDICINE

## 2022-03-29 PROCEDURE — 85610 PROTHROMBIN TIME: CPT | Performed by: EMERGENCY MEDICINE

## 2022-03-29 PROCEDURE — 83690 ASSAY OF LIPASE: CPT | Performed by: EMERGENCY MEDICINE

## 2022-03-29 PROCEDURE — 258N000003 HC RX IP 258 OP 636: Performed by: EMERGENCY MEDICINE

## 2022-03-29 PROCEDURE — 99223 1ST HOSP IP/OBS HIGH 75: CPT | Performed by: INTERNAL MEDICINE

## 2022-03-29 PROCEDURE — 87186 SC STD MICRODIL/AGAR DIL: CPT | Performed by: EMERGENCY MEDICINE

## 2022-03-29 PROCEDURE — 83605 ASSAY OF LACTIC ACID: CPT | Performed by: EMERGENCY MEDICINE

## 2022-03-29 PROCEDURE — 87635 SARS-COV-2 COVID-19 AMP PRB: CPT | Performed by: EMERGENCY MEDICINE

## 2022-03-29 PROCEDURE — 250N000013 HC RX MED GY IP 250 OP 250 PS 637: Performed by: INTERNAL MEDICINE

## 2022-03-29 PROCEDURE — 96375 TX/PRO/DX INJ NEW DRUG ADDON: CPT

## 2022-03-29 RX ORDER — ONDANSETRON 2 MG/ML
4 INJECTION INTRAMUSCULAR; INTRAVENOUS ONCE
Status: COMPLETED | OUTPATIENT
Start: 2022-03-29 | End: 2022-03-29

## 2022-03-29 RX ORDER — ONDANSETRON 2 MG/ML
4 INJECTION INTRAMUSCULAR; INTRAVENOUS EVERY 6 HOURS PRN
Status: DISCONTINUED | OUTPATIENT
Start: 2022-03-29 | End: 2022-03-31 | Stop reason: HOSPADM

## 2022-03-29 RX ORDER — IOPAMIDOL 755 MG/ML
75 INJECTION, SOLUTION INTRAVASCULAR ONCE
Status: COMPLETED | OUTPATIENT
Start: 2022-03-29 | End: 2022-03-29

## 2022-03-29 RX ORDER — LISINOPRIL 5 MG/1
10 TABLET ORAL DAILY
Status: DISCONTINUED | OUTPATIENT
Start: 2022-03-30 | End: 2022-03-31 | Stop reason: HOSPADM

## 2022-03-29 RX ORDER — HYDRALAZINE HYDROCHLORIDE 20 MG/ML
10 INJECTION INTRAMUSCULAR; INTRAVENOUS EVERY 6 HOURS PRN
Status: DISCONTINUED | OUTPATIENT
Start: 2022-03-29 | End: 2022-03-31 | Stop reason: HOSPADM

## 2022-03-29 RX ORDER — LIDOCAINE 40 MG/G
CREAM TOPICAL
Status: DISCONTINUED | OUTPATIENT
Start: 2022-03-29 | End: 2022-03-31 | Stop reason: HOSPADM

## 2022-03-29 RX ORDER — CEFTRIAXONE 1 G/1
1 INJECTION, POWDER, FOR SOLUTION INTRAMUSCULAR; INTRAVENOUS ONCE
Status: COMPLETED | OUTPATIENT
Start: 2022-03-29 | End: 2022-03-29

## 2022-03-29 RX ORDER — ATORVASTATIN CALCIUM 40 MG/1
40 TABLET, FILM COATED ORAL EVERY EVENING
Status: DISCONTINUED | OUTPATIENT
Start: 2022-03-29 | End: 2022-03-31 | Stop reason: HOSPADM

## 2022-03-29 RX ORDER — METOPROLOL TARTRATE 25 MG/1
25 TABLET, FILM COATED ORAL 2 TIMES DAILY
Status: DISCONTINUED | OUTPATIENT
Start: 2022-03-29 | End: 2022-03-31 | Stop reason: HOSPADM

## 2022-03-29 RX ORDER — CEFTRIAXONE 1 G/1
1 INJECTION, POWDER, FOR SOLUTION INTRAMUSCULAR; INTRAVENOUS EVERY 24 HOURS
Status: DISCONTINUED | OUTPATIENT
Start: 2022-03-30 | End: 2022-03-31

## 2022-03-29 RX ORDER — SODIUM CHLORIDE 9 MG/ML
INJECTION, SOLUTION INTRAVENOUS CONTINUOUS
Status: DISCONTINUED | OUTPATIENT
Start: 2022-03-29 | End: 2022-03-30

## 2022-03-29 RX ADMIN — SODIUM CHLORIDE: 9 INJECTION, SOLUTION INTRAVENOUS at 22:06

## 2022-03-29 RX ADMIN — IOPAMIDOL 75 ML: 755 INJECTION, SOLUTION INTRAVENOUS at 18:29

## 2022-03-29 RX ADMIN — Medication 1 MG: at 22:10

## 2022-03-29 RX ADMIN — ONDANSETRON 4 MG: 2 INJECTION INTRAMUSCULAR; INTRAVENOUS at 19:00

## 2022-03-29 RX ADMIN — ATORVASTATIN CALCIUM 40 MG: 40 TABLET, FILM COATED ORAL at 22:06

## 2022-03-29 RX ADMIN — SODIUM CHLORIDE 1000 ML: 9 INJECTION, SOLUTION INTRAVENOUS at 16:21

## 2022-03-29 RX ADMIN — CEFTRIAXONE SODIUM 1 G: 1 INJECTION, POWDER, FOR SOLUTION INTRAMUSCULAR; INTRAVENOUS at 17:52

## 2022-03-29 RX ADMIN — METOPROLOL TARTRATE 25 MG: 25 TABLET, FILM COATED ORAL at 22:06

## 2022-03-29 ASSESSMENT — ENCOUNTER SYMPTOMS
DYSURIA: 0
COLOR CHANGE: 0
DIFFICULTY URINATING: 0
BACK PAIN: 0
EYE REDNESS: 0
DIARRHEA: 1
CONFUSION: 0
FLANK PAIN: 0
BLOOD IN STOOL: 0
FEVER: 0
NAUSEA: 1
VOMITING: 1
CHILLS: 0
ABDOMINAL PAIN: 0
WEAKNESS: 1
HEADACHES: 0
SHORTNESS OF BREATH: 0
COUGH: 0
HEMATURIA: 0
FATIGUE: 1

## 2022-03-29 ASSESSMENT — ACTIVITIES OF DAILY LIVING (ADL)
ADLS_ACUITY_SCORE: 12
TOILETING_ISSUES: NO
DIFFICULTY_COMMUNICATING: NO
DOING_ERRANDS_INDEPENDENTLY_DIFFICULTY: NO
ADLS_ACUITY_SCORE: 7
ADLS_ACUITY_SCORE: 12
ADLS_ACUITY_SCORE: 16
CONCENTRATING,_REMEMBERING_OR_MAKING_DECISIONS_DIFFICULTY: NO
DIFFICULTY_EATING/SWALLOWING: NO
HEARING_DIFFICULTY_OR_DEAF: NO
WEAR_GLASSES_OR_BLIND: NO
ADLS_ACUITY_SCORE: 12
DRESSING/BATHING_DIFFICULTY: NO
WALKING_OR_CLIMBING_STAIRS_DIFFICULTY: NO
CHANGE_IN_FUNCTIONAL_STATUS_SINCE_ONSET_OF_CURRENT_ILLNESS/INJURY: NO
FALL_HISTORY_WITHIN_LAST_SIX_MONTHS: NO

## 2022-03-29 NOTE — ED NOTES
"[facility] ED Handoff Report    ED Chief Complaint: Nausea, Vomiting, Diarrhea    ED Diagnosis:  (K52.9) Gastroenteritis    (D64.9) Anemia, unspecified type    (R53.81) Malaise    (N39.0) Urinary tract infection without hematuria, site unspecified       PMH:    Past Medical History:   Diagnosis Date     Anxiety      CKD (chronic kidney disease), stage III (H)      Depression      HLD (hyperlipidemia)      HTN (hypertension)      Hypothyroidism      Lumbar spondylosis      Osteoporosis         Code Status:  Prior     Falls Risk: Yes Band: Applied    Current Living Situation/Residence: lives alone     Elimination Status: Continent: Yes     Activity Level: SBA w/ walker    Patients Preferred Language:  English     Needed: No    Vital Signs:  BP (!) 178/78   Pulse 86   Temp 98.5  F (36.9  C) (Oral)   Resp 20   Ht 1.626 m (5' 4\")   Wt 68 kg (150 lb)   SpO2 92%   BMI 25.75 kg/m       Cardiac Rhythm: NSR    Pain Score: 0/10    Is the Patient Confused:  No    Last Food or Drink: 03/29/22 water at 1730    Focused Assessment:  Pt came in via EMS ambulance with N/V/D for 24 hours.  Reportedly had a temp.  Blood cultures drawn.  Pt has n/v with movement.  Pt has not been out of bed today.  UA sent.  Pt positive for UTI.  Pt transfers well to the commode.  Fluid bolus administered.  A&Ox4.      Tests Performed: Done: Labs and Imaging    Treatments Provided:  IV fluids and antibiotics    Family Dynamics/Concerns: No    Family Updated On Visitor Policy: Yes    Plan of Care Communicated to Family: Yes    Who Was Updated about Plan of Care: daughter, Flower at bedside.    Belongings Checklist Done and Signed by Patient: Yes    Medications sent with patient: NA    Covid: asymptomatic , pending      RN: Luzma Ryan   3/29/2022 6:49 PM       "

## 2022-03-29 NOTE — ED NOTES
Bed: JNED-09  Expected date: 3/29/22  Expected time: 3:47 PM  Means of arrival: Ambulance  Comments:  WBL   85 F n/v/d Temp 103.

## 2022-03-29 NOTE — ED TRIAGE NOTES
"Pt arrived via [facility].  EMS called due to pt having N/V/D for 24 hours.  Pt has reportedly not been out of bed in that time.  Pt reports \"not feeling well\" since stroke 6 weeks ago.  Pt received 400 mL bolus of fluid and 4 mg of Zofran.  .  Pt lives at home alone.  Pt A&Ox4.  "

## 2022-03-29 NOTE — ED PROVIDER NOTES
EMERGENCY DEPARTMENT ENCOUNTER      NAME: Remedios Vincent  YOB: 1934  MRN: [id number]      FINAL IMPRESSION  1. Gastroenteritis    2. Anemia, unspecified type    3. Malaise    4. Urinary tract infection without hematuria, site unspecified        MEDICAL DECISION MAKING   Pertinent Labs & Imaging studies reviewed. (See chart for details)    ED Course as of 03/29/22 1929   Tue Mar 29, 2022   1618 Remedios Vincent Is an 87 year old who presented for evaluation of nausea, vomiting, diarrhea, and generalized malaise/fatigue. Symptoms began yesterday with generalized fatigue. Today, she started to experience GI symptoms. Patient attended a birthday party the day before she started to feel tired and notes that a few family members ended up not coming because they were sick. No fever, cough, chest pain, abdominal pain, urinary symptoms. Patient was noted to be vomiting on medics' arrival and was given zofran and small fluid bolus with mild improvement. Medics reported temp 103 but on arrival here, patient was afebrile. BP and HR stable. Remainder of history and exam, as below.    1621 I considered a broad differential diagnosis including, but not limited to: viral gastroenteritis, gastritis, food intolerance, IBS, IBD. Abdominal exam benign with no focal tenderness or peritoneal signs to suggest acute appendicitis, pancreatitis, diverticulitis, obstruction, perforated viscus, or other acute surgical process. Given the lack of blood in the stools or fever, seems less likely bacterial infection or IBD. No history of DM or gastroparesis. Overall, symptoms most consistent with viral gastroenteritis. Discussed options for workup and management with patient and daughter. We have agreed on plan to check labs, UA, and manage symptoms with IVF and IV analgesic/antiemetic if nausea/pain recur. Will hold on imaging for now given patient has no complaints of abdominal pain and a very benign abdomen.    1850 Comprehensive  metabolic panel(!)  CMP reassuring. No evidence of TRINA, acidosis, or significant electrolyte derangement. No acute elevation of bilirubin or transaminates to suggest acute hepatobiliary process.   1850 Lipase  Wnl. Symptoms less likely related to acute pancreatitis.   1851 Lactic acid whole blood  Wnl. Less likely end-organ ischemia or systemic infectious process.    1851 Hemoglobin(!): 8.8  CBC notable for an acute drop in HGB as compared to most recent in January 2022, nearly 5 points. Patient denies dark/bloody stools. She does have a history of anemia requiring transfusion. Will add on INR, type and screen, and CT scan.    1852 UA with Microscopic reflex to Culture(!)  UA revealed evidence of infection with nitrite, LE, bacteria, WBCs and clumps. Unclear if this is contributing to her generalized malaise. Will send for culture and start on rocephin for now.       Discussed the case with Dr. George of hospital medicine team who agreed to facilitate admission to inpatient. He will follow up on results of CT scan. COVID was ordered for screening/hospitalization purposes. Patient did have recurrent nausea/emesis and was given a dose of zofran just prior to being moved upstairs. She was transferred to the floor in stable condition. No acute events under my care.       ED COURSE  4:12 PM I met with the patient, obtained history, performed an initial exam, and discussed options and plan for diagnostics and treatment here in the ED.  5:38 PM Checked on patient and updated on results. Patient's daughter is wanting the patient to stay for admission.  5:45 PM Updated patient with results of UA.   6:00 PM Discussed case with Dr. George who will admit patient    MEDICATIONS GIVEN IN THE ED  Medications   0.9% sodium chloride BOLUS (0 mLs Intravenous Stopped 3/29/22 1747)   cefTRIAXone (ROCEPHIN) 1 g vial to attach to  mL bag for ADULTS or NS 50 mL bag for PEDS (0 g Intravenous Stopped 3/29/22 1821)   iopamidol  (ISOVUE-370) solution 75 mL (75 mLs Intravenous Given 3/29/22 1829)   ondansetron (ZOFRAN) injection 4 mg (4 mg Intravenous Given 3/29/22 1900)       NEW PRESCRIPTIONS STARTED AT TODAY'S VISIT  New Prescriptions    No medications on file     =================================================================    Chief Complaint   Patient presents with     Nausea, Vomiting, & Diarrhea       HPI:    Patient information was obtained from: Patient and daughter     Use of : N/A     Remedios Vincent is a 87 year old female who presents for evaluation of nausea, vomiting, diarrhea, and malaise/generalized fatigue. She reports onset of symptoms yesterday starting with generalized ill feeling. Today, she started to experience nausea and had episodes of non-bloody diarrhea and vomiting. She lives alone but called her daughter. When daughter arrived, she did have an episode of emesis. Patient notes that she went to a birthday party 2 days ago so might have been exposed to ill people there but is not sure. She denies associated chest pain, dyspnea, fever, chills, abdominal pain, dysuria, hematuria, or focal neuro symptoms. She notes that she was admitted in January for a stroke. She currently lives alone. She has been taking all of her medications as prescribed but did not take today's doses yet. No other new complaints.       RELEVANT HISTORY, MEDICATIONS, & ALLERGIES   Past medical history, surgical history, family history, medications, and allergies reviewed and pertinent noted in HPI. See end of note for comprehensive list.    REVIEW OF SYSTEMS:  Review of Systems   Constitutional: Positive for fatigue. Negative for chills and fever.   HENT: Negative for congestion.    Eyes: Negative for redness.   Respiratory: Negative for cough and shortness of breath.    Cardiovascular: Negative for chest pain.   Gastrointestinal: Positive for diarrhea, nausea and vomiting. Negative for abdominal pain and blood in stool.  "  Genitourinary: Negative for difficulty urinating, dysuria, flank pain and hematuria.   Musculoskeletal: Negative for back pain.   Skin: Negative for color change.   Neurological: Positive for weakness (generalized). Negative for headaches.   Psychiatric/Behavioral: Negative for confusion.       PHYSICAL EXAM:    Vitals: BP (!) 177/78   Pulse 87   Temp 98.5  F (36.9  C) (Oral)   Resp 21   Ht 1.626 m (5' 4\")   Wt 68 kg (150 lb)   SpO2 94%   BMI 25.75 kg/m     General: Alert and interactive, comfortable appearing.  HENT: Oropharynx without erythema or exudates. MMM.   Eyes: Pupils mid-sized and equally reactive.   Neck: Full AROM.   Cardiovascular: Regular rate and rhythm. Peripheral pulses 2+ bilaterally.  Chest/Pulmonary: Normal work of breathing. Lung sounds clear and equal throughout, no wheezes or crackles. No chest wall tenderness or deformities.  Abdomen: Soft, nondistended. Nontender without guarding or rebound.  Back/Spine: No CVA or midline tenderness.  Extremities: Normal ROM of all major joints. No lower extremity edema.   Skin: Warm and dry. Normal skin color.   Neuro: Speech clear. CNs grossly intact. Moves all extremities appropriately. Strength and sensation grossly intact to all extremities.   Psych: Normal affect/mood, cooperative. Mild memory deficits, baseline per daughter.      LAB  Labs Ordered and Resulted from Time of ED Arrival to Time of ED Departure   COMPREHENSIVE METABOLIC PANEL - Abnormal       Result Value    Sodium 137      Potassium 4.4      Chloride 107      Carbon Dioxide (CO2) 23      Anion Gap 7      Urea Nitrogen 17      Creatinine 0.93      Calcium 7.9 (*)     Glucose 120      Alkaline Phosphatase 53      AST 14      ALT <9      Protein Total 6.3      Albumin 3.3 (*)     Bilirubin Total 0.3      GFR Estimate 59 (*)    CBC WITH PLATELETS - Abnormal    WBC Count 7.7      RBC Count 3.20 (*)     Hemoglobin 8.8 (*)     Hematocrit 29.0 (*)     MCV 91      MCH 27.5      MCHC " 30.3 (*)     RDW 14.5      Platelet Count 227     ROUTINE UA WITH MICROSCOPIC REFLEX TO CULTURE - Abnormal    Color Urine Light Yellow      Appearance Urine Clear      Glucose Urine Negative      Bilirubin Urine Negative      Ketones Urine Negative      Specific Gravity Urine 1.018      Blood Urine Negative      pH Urine 8.0 (*)     Protein Albumin Urine Negative      Urobilinogen Urine <2.0      Nitrite Urine Positive (*)     Leukocyte Esterase Urine 75 Tila/uL (*)     Bacteria Urine Few (*)     WBC Clumps Urine Present (*)     Mucus Urine Present (*)     RBC Urine <1      WBC Urine 11 (*)     Squamous Epithelials Urine 1      Transitional Epithelials Urine 1     LACTIC ACID WHOLE BLOOD - Normal    Lactic Acid 0.8     LIPASE - Normal    Lipase 47     MAGNESIUM - Normal    Magnesium 2.0     INR - Normal    INR 1.03     COVID-19 VIRUS (CORONAVIRUS) BY PCR   TYPE AND SCREEN, ADULT    ABO/RH(D) O POS      Antibody Screen Negative      SPECIMEN EXPIRATION DATE [id number]     ABO AND RH   URINE CULTURE   ABO/RH TYPE AND SCREEN       RADIOLOGY  CT Abdomen Pelvis w Contrast   Final Result   IMPRESSION:       1.  Few regions of asymmetric small bowel wall hyperenhancement, exaggerated by decompression. Additional mild liquid colonic stool. Findings may be sequela of an enterocolitis.      2.  Colonic diverticulosis without definitive diverticulitis.      3.  Moderate hiatus hernia.      4.  Slight urinary bladder wall hyperenhancement may reflect cystitis.      5.  Small amount of free pelvic fluid. No free air. No abscess.      6.  Cystic change in the pancreatic head up to roughly 18 mm, possibly IPMN formation. Recommend 12 month follow-up CT or MRI per guidelines below.      7.  Other findings in the report.      REFERENCE:   International Consensus Guidelines for Management of IPMN and MCN of the Pancreas. Pancreatology 12 (2012) 183-197.      Asymptomatic patient without high-risk stigmata of malignancy  (obstructive jaundice with cystic lesion in head of pancreas, enhancing solid component within cyst, or main pancreatic duct greater than 10mm), and without worrisome features (cyst greater    than 3 cm, thickened/enhancing cyst walls, main pancreatic duct 5-9 mm, mural nodule, or abrupt change in caliber of pancreatic duct with distal pancreatic atrophy).      Size of largest cyst:   Less than 1 cm: CT or MRI with contrast in 2-3 years.      1-2 cm: CT or MRI with contrast yearly for 2 years, then lengthen interval if no change.      2-3 cm: EUS in 3-6 months, then can alternate MRI with EUS as appropriate.      Greater than 3 cm: Close surveillance alternating MRI with EUS every 3-6 months. Consider surgery in young, fit patients.      Consider Gastroenterology consultation for all categories of pancreatic cysts.          Comprehensive outline of [facility] chart Hx  PAST MEDICAL HISTORY    Past Medical History:   Diagnosis Date     Anxiety      CKD (chronic kidney disease), stage III (H)      Depression      HLD (hyperlipidemia)      HTN (hypertension)      Hypothyroidism      Lumbar spondylosis      Osteoporosis      Past Surgical History:   Procedure Laterality Date      REMOVAL OF TONSILS,<11 Y/O      Description: Tonsillectomy;  Recorded: 05/22/2014;     [facility] REMOVAL OF OVARY(S)      Description: Oophorectomy;  Recorded: 05/22/2014;     [facility] TOTAL ABDOM HYSTERECTOMY      Description: Hysterectomy;  Recorded: 05/22/2014;     [facility] TOTAL ABDOM HYSTERECTOMY      Description: Total Abdominal Hysterectomy;  Recorded: 05/22/2014;       CURRENT MEDICATIONS    Current Outpatient Medications   Medication Instructions     aspirin (ASA) 81 mg, Oral, DAILY     atorvastatin (LIPITOR) 40 mg, Oral, EVERY EVENING     clopidogrel (PLAVIX) 75 mg, Oral, DAILY     lisinopril (ZESTRIL) 10 mg, Oral, DAILY     metoprolol tartrate (LOPRESSOR) 25 mg, Oral, 2 TIMES DAILY     sertraline (ZOLOFT) 100 mg, Oral, DAILY       ALLERGIES    Allergies    Allergen Reactions     Seafood Unknown       FAMILY HISTORY    No family history on file.    SOCIAL HISTORY      No history of tobacco use  No alcohol use  Lives independently       Samreen Negro M.D.  Emergency Medicine  [facility] EMERGENCY DEPARTMENT  [address] [place] [id number] [phone]  Dept: [phone]     Samreen Negro MD  03/29/22 1932

## 2022-03-29 NOTE — PHARMACY-ADMISSION MEDICATION HISTORY
Pharmacy Note - Admission Medication History    Pertinent Provider Information: Nothing has changed since last stay in hospital      ______________________________________________________________________    Prior To Admission (PTA) med list completed and updated in EMR.       PTA Med List   Medication Sig Last Dose     aspirin (ASA) 81 MG EC tablet Take 1 tablet (81 mg) by mouth daily 3/28/2022 at Unknown time     atorvastatin (LIPITOR) 40 MG tablet Take 1 tablet (40 mg) by mouth every evening 3/28/2022 at Unknown time     clopidogrel (PLAVIX) 75 MG tablet Take 1 tablet (75 mg) by mouth daily 3/28/2022 at Unknown time     lisinopril (ZESTRIL) 10 MG tablet Take 1 tablet (10 mg) by mouth daily 3/28/2022 at Unknown time     metoprolol tartrate (LOPRESSOR) 25 MG tablet Take 1 tablet (25 mg) by mouth 2 times daily 3/28/2022 at Unknown time     sertraline (ZOLOFT) 100 MG tablet Take 1 tablet (100 mg) by mouth daily 3/28/2022 at Unknown time       Information source(s): Patient, Family member, Hospital records and [facility]  Method of interview communication: in-person    Summary of Changes to PTA Med List  New: -  Discontinued: -  Changed: -    Patient was asked about OTC/herbal products specifically.  PTA med list reflects this.    In the past week, patient estimated taking medication this percent of the time:  greater than 90%.    Allergies were reviewed, assessed, and updated with the patient.      Patient does not use any multi-dose medications prior to admission.    The information provided in this note is only as accurate as the sources available at the time of the update(s).    Thank you for the opportunity to participate in the care of this patient.    JESSY AMARAL RPH  3/29/2022 5:58 PM

## 2022-03-30 ENCOUNTER — APPOINTMENT (OUTPATIENT)
Dept: OCCUPATIONAL THERAPY | Facility: HOSPITAL | Age: 87
DRG: 392 | End: 2022-03-30
Attending: INTERNAL MEDICINE
Payer: COMMERCIAL

## 2022-03-30 ENCOUNTER — APPOINTMENT (OUTPATIENT)
Dept: PHYSICAL THERAPY | Facility: HOSPITAL | Age: 87
DRG: 392 | End: 2022-03-30
Attending: INTERNAL MEDICINE
Payer: COMMERCIAL

## 2022-03-30 LAB
ANION GAP SERPL CALCULATED.3IONS-SCNC: 8 MMOL/L (ref 5–18)
BASOPHILS # BLD AUTO: 0 10E3/UL (ref 0–0.2)
BASOPHILS NFR BLD AUTO: 0 %
BUN SERPL-MCNC: 13 MG/DL (ref 8–28)
CALCIUM SERPL-MCNC: 7.4 MG/DL (ref 8.5–10.5)
CHLORIDE BLD-SCNC: 108 MMOL/L (ref 98–107)
CO2 SERPL-SCNC: 21 MMOL/L (ref 22–31)
CREAT SERPL-MCNC: 0.88 MG/DL (ref 0.6–1.1)
EOSINOPHIL # BLD AUTO: 0.1 10E3/UL (ref 0–0.7)
EOSINOPHIL NFR BLD AUTO: 2 %
ERYTHROCYTE [DISTWIDTH] IN BLOOD BY AUTOMATED COUNT: 14.6 % (ref 10–15)
ERYTHROCYTE [DISTWIDTH] IN BLOOD BY AUTOMATED COUNT: 14.7 % (ref 10–15)
FERRITIN SERPL-MCNC: 20 NG/ML (ref 10–130)
FOLATE SERPL-MCNC: 3.4 NG/ML
GFR SERPL CREATININE-BSD FRML MDRD: 63 ML/MIN/1.73M2
GLUCOSE BLD-MCNC: 102 MG/DL (ref 70–125)
HCT VFR BLD AUTO: 24 % (ref 35–47)
HCT VFR BLD AUTO: 24.2 % (ref 35–47)
HGB BLD-MCNC: 7.2 G/DL (ref 11.7–15.7)
HGB BLD-MCNC: 7.3 G/DL (ref 11.7–15.7)
IMM GRANULOCYTES # BLD: 0 10E3/UL
IMM GRANULOCYTES NFR BLD: 1 %
IRON SATN MFR SERPL: 8 % (ref 20–50)
IRON SERPL-MCNC: 22 UG/DL (ref 42–175)
LYMPHOCYTES # BLD AUTO: 0.7 10E3/UL (ref 0.8–5.3)
LYMPHOCYTES NFR BLD AUTO: 20 %
MCH RBC QN AUTO: 27.3 PG (ref 26.5–33)
MCH RBC QN AUTO: 27.7 PG (ref 26.5–33)
MCHC RBC AUTO-ENTMCNC: 30 G/DL (ref 31.5–36.5)
MCHC RBC AUTO-ENTMCNC: 30.2 G/DL (ref 31.5–36.5)
MCV RBC AUTO: 91 FL (ref 78–100)
MCV RBC AUTO: 92 FL (ref 78–100)
MONOCYTES # BLD AUTO: 0.3 10E3/UL (ref 0–1.3)
MONOCYTES NFR BLD AUTO: 10 %
NEUTROPHILS # BLD AUTO: 2.4 10E3/UL (ref 1.6–8.3)
NEUTROPHILS NFR BLD AUTO: 67 %
NRBC # BLD AUTO: 0 10E3/UL
NRBC BLD AUTO-RTO: 0 /100
PLATELET # BLD AUTO: 174 10E3/UL (ref 150–450)
PLATELET # BLD AUTO: 182 10E3/UL (ref 150–450)
POTASSIUM BLD-SCNC: 4.2 MMOL/L (ref 3.5–5)
RBC # BLD AUTO: 2.6 10E6/UL (ref 3.8–5.2)
RBC # BLD AUTO: 2.67 10E6/UL (ref 3.8–5.2)
RETICS # AUTO: 0.06 10E6/UL (ref 0.01–0.11)
RETICS/RBC NFR AUTO: 2.1 % (ref 0.8–2.7)
SODIUM SERPL-SCNC: 137 MMOL/L (ref 136–145)
TIBC SERPL-MCNC: 285 UG/DL (ref 313–563)
TRANSFERRIN SERPL-MCNC: 228 MG/DL (ref 212–360)
VIT B12 SERPL-MCNC: 185 PG/ML (ref 213–816)
WBC # BLD AUTO: 3.5 10E3/UL (ref 4–11)
WBC # BLD AUTO: 5.1 10E3/UL (ref 4–11)

## 2022-03-30 PROCEDURE — 85045 AUTOMATED RETICULOCYTE COUNT: CPT | Performed by: FAMILY MEDICINE

## 2022-03-30 PROCEDURE — 250N000013 HC RX MED GY IP 250 OP 250 PS 637: Performed by: INTERNAL MEDICINE

## 2022-03-30 PROCEDURE — 97166 OT EVAL MOD COMPLEX 45 MIN: CPT | Mod: GO

## 2022-03-30 PROCEDURE — 97162 PT EVAL MOD COMPLEX 30 MIN: CPT | Mod: GP | Performed by: PHYSICAL THERAPIST

## 2022-03-30 PROCEDURE — 84466 ASSAY OF TRANSFERRIN: CPT | Performed by: FAMILY MEDICINE

## 2022-03-30 PROCEDURE — 999N000127 HC STATISTIC PERIPHERAL IV START W US GUIDANCE

## 2022-03-30 PROCEDURE — 97535 SELF CARE MNGMENT TRAINING: CPT | Mod: GO

## 2022-03-30 PROCEDURE — 120N000001 HC R&B MED SURG/OB

## 2022-03-30 PROCEDURE — 85027 COMPLETE CBC AUTOMATED: CPT | Performed by: FAMILY MEDICINE

## 2022-03-30 PROCEDURE — 97116 GAIT TRAINING THERAPY: CPT | Mod: GP | Performed by: PHYSICAL THERAPIST

## 2022-03-30 PROCEDURE — 250N000011 HC RX IP 250 OP 636: Performed by: FAMILY MEDICINE

## 2022-03-30 PROCEDURE — C9113 INJ PANTOPRAZOLE SODIUM, VIA: HCPCS | Performed by: FAMILY MEDICINE

## 2022-03-30 PROCEDURE — 97110 THERAPEUTIC EXERCISES: CPT | Mod: GP | Performed by: PHYSICAL THERAPIST

## 2022-03-30 PROCEDURE — 82607 VITAMIN B-12: CPT | Performed by: FAMILY MEDICINE

## 2022-03-30 PROCEDURE — 82746 ASSAY OF FOLIC ACID SERUM: CPT | Performed by: FAMILY MEDICINE

## 2022-03-30 PROCEDURE — 82728 ASSAY OF FERRITIN: CPT | Performed by: FAMILY MEDICINE

## 2022-03-30 PROCEDURE — 80048 BASIC METABOLIC PNL TOTAL CA: CPT | Performed by: INTERNAL MEDICINE

## 2022-03-30 PROCEDURE — 36415 COLL VENOUS BLD VENIPUNCTURE: CPT | Performed by: INTERNAL MEDICINE

## 2022-03-30 PROCEDURE — 85025 COMPLETE CBC W/AUTO DIFF WBC: CPT | Performed by: INTERNAL MEDICINE

## 2022-03-30 PROCEDURE — 36415 COLL VENOUS BLD VENIPUNCTURE: CPT | Performed by: FAMILY MEDICINE

## 2022-03-30 PROCEDURE — 99232 SBSQ HOSP IP/OBS MODERATE 35: CPT | Performed by: FAMILY MEDICINE

## 2022-03-30 PROCEDURE — 250N000011 HC RX IP 250 OP 636: Performed by: INTERNAL MEDICINE

## 2022-03-30 RX ORDER — ONDANSETRON 2 MG/ML
4 INJECTION INTRAMUSCULAR; INTRAVENOUS
Status: DISCONTINUED | OUTPATIENT
Start: 2022-03-30 | End: 2022-03-31 | Stop reason: HOSPADM

## 2022-03-30 RX ORDER — LIDOCAINE 40 MG/G
CREAM TOPICAL
Status: DISCONTINUED | OUTPATIENT
Start: 2022-03-30 | End: 2022-03-31 | Stop reason: HOSPADM

## 2022-03-30 RX ORDER — MAGNESIUM CARB/ALUMINUM HYDROX 105-160MG
296 TABLET,CHEWABLE ORAL ONCE
Status: DISCONTINUED | OUTPATIENT
Start: 2022-03-30 | End: 2022-03-31

## 2022-03-30 RX ORDER — BISACODYL 5 MG
10 TABLET, DELAYED RELEASE (ENTERIC COATED) ORAL ONCE
Status: COMPLETED | OUTPATIENT
Start: 2022-03-30 | End: 2022-03-30

## 2022-03-30 RX ORDER — LANOLIN ALCOHOL/MO/W.PET/CERES
3 CREAM (GRAM) TOPICAL
Status: DISCONTINUED | OUTPATIENT
Start: 2022-03-30 | End: 2022-03-31 | Stop reason: HOSPADM

## 2022-03-30 RX ORDER — POLYETHYLENE GLYCOL 3350 17 G/17G
238 POWDER, FOR SOLUTION ORAL ONCE
Status: DISCONTINUED | OUTPATIENT
Start: 2022-03-30 | End: 2022-03-31

## 2022-03-30 RX ADMIN — PANTOPRAZOLE SODIUM 40 MG: 40 INJECTION, POWDER, FOR SOLUTION INTRAVENOUS at 08:09

## 2022-03-30 RX ADMIN — METOPROLOL TARTRATE 25 MG: 25 TABLET, FILM COATED ORAL at 20:35

## 2022-03-30 RX ADMIN — CEFTRIAXONE SODIUM 1 G: 1 INJECTION, POWDER, FOR SOLUTION INTRAMUSCULAR; INTRAVENOUS at 19:05

## 2022-03-30 RX ADMIN — PANTOPRAZOLE SODIUM 40 MG: 40 INJECTION, POWDER, FOR SOLUTION INTRAVENOUS at 20:35

## 2022-03-30 RX ADMIN — SERTRALINE HYDROCHLORIDE 100 MG: 50 TABLET ORAL at 08:09

## 2022-03-30 RX ADMIN — METOPROLOL TARTRATE 25 MG: 25 TABLET, FILM COATED ORAL at 08:10

## 2022-03-30 RX ADMIN — LISINOPRIL 10 MG: 5 TABLET ORAL at 08:09

## 2022-03-30 RX ADMIN — BISACODYL 10 MG: 5 TABLET, COATED ORAL at 11:46

## 2022-03-30 RX ADMIN — ATORVASTATIN CALCIUM 40 MG: 40 TABLET, FILM COATED ORAL at 20:35

## 2022-03-30 ASSESSMENT — ACTIVITIES OF DAILY LIVING (ADL)
ADLS_ACUITY_SCORE: 7
ADLS_ACUITY_SCORE: 9
ADLS_ACUITY_SCORE: 7
ADLS_ACUITY_SCORE: 9
ADLS_ACUITY_SCORE: 7
ADLS_ACUITY_SCORE: 9
ADLS_ACUITY_SCORE: 7
ADLS_ACUITY_SCORE: 9
ADLS_ACUITY_SCORE: 7
ADLS_ACUITY_SCORE: 9
ADLS_ACUITY_SCORE: 7
ADLS_ACUITY_SCORE: 7
ADLS_ACUITY_SCORE: 9
ADLS_ACUITY_SCORE: 7
ADLS_ACUITY_SCORE: 7

## 2022-03-30 NOTE — PROGRESS NOTES
03/30/22 0830   Quick Adds   Type of Visit Initial PT Evaluation   Living Environment   People in Home alone   Current Living Arrangements condominium   Home Accessibility no concerns  (elevator)   Self-Care   Equipment Currently Used at Home walker, rolling  (4WW)   Fall history within last six months no   Activity/Exercise/Self-Care Comment Pt denies falls at home, per chart review, daughter reports patient has been falling more frequently.   General Information   Onset of Illness/Injury or Date of Surgery 03/29/22   Referring Physician Mack Friedman MBBS   Patient/Family Therapy Goals Statement (PT) None stated.   Pertinent History of Current Problem (include personal factors and/or comorbidities that impact the POC) Pt admitted with nausea, vomiting, diarrhea.   Existing Precautions/Restrictions fall   Range of Motion (ROM)   ROM Comment Decreased ROM with L knee extension secondary to chronic pain.   Strength (Manual Muscle Testing)   Strength (Manual Muscle Testing) Deficits observed during functional mobility   Transfers   Transfers sit-stand transfer   Transfer Safety Concerns Noted decreased weight-shifting ability   Impairments Contributing to Impaired Transfers impaired balance;decreased strength   Sit-Stand Transfer   Sit-Stand Traverse (Transfers) minimum assist (75% patient effort);verbal cues   Assistive Device (Sit-Stand Transfers) walker, front-wheeled   Gait/Stairs (Locomotion)   Traverse Level (Gait) contact guard;verbal cues   Assistive Device (Gait) walker, front-wheeled   Distance in Feet (Required for LE Total Joints) 150'   Pattern (Gait) step-through   Deviations/Abnormal Patterns (Gait) abimbola decreased;gait speed decreased   Clinical Impression   Criteria for Skilled Therapeutic Intervention Yes, treatment indicated   PT Diagnosis (PT) impaired functional mobility   Influenced by the following impairments decreased strength, impaired balance, decreased endurance    Functional limitations due to impairments difficulty with transfers, gait   Clinical Presentation (PT Evaluation Complexity) Evolving/Changing   Clinical Presentation Rationale Pt presents as medically diagnosed.   Clinical Decision Making (Complexity) moderate complexity   Planned Therapy Interventions (PT) balance training;bed mobility training;gait training;home exercise program;neuromuscular re-education;patient/family education;strengthening;transfer training   Risk & Benefits of therapy have been explained evaluation/treatment results reviewed;participants voiced agreement with care plan;patient;participants included   PT Discharge Planning   PT Discharge Recommendation (DC Rec) Transitional Care Facility;home with assist;home with home care physical therapy   PT Rationale for DC Rec Pt requiring min assist for sit <> stand. Pt lives alone at baseline. Recommend TCU at discharge. If patient discharges home, will need 24 hour supervision and home PT.   Plan of Care Review   Plan of Care Reviewed With patient   Total Evaluation Time   Total Evaluation Time (Minutes) 10   Physical Therapy Goals   PT Frequency Daily   PT Predicated Duration/Target Date for Goal Attainment 04/06/22   PT Goals Bed Mobility;Gait;Transfers;Stairs   PT: Bed Mobility Independent;Supine to/from sit   PT: Transfers Supervision/stand-by assist;Sit to/from stand;Assistive device   PT: Gait Supervision/stand-by assist;Assistive device;Rolling walker;Greater than 200 feet   PT: Stairs Completed     Malaika Sands, PT  3/30/2022

## 2022-03-30 NOTE — H&P
ADMISSION HISTORY & PHYSICAL    CODE STATUS:  Full code    Edin Dailey, [phone]    Patient YOB: 1934  Admit date: 3/29/2022  Date of Service: 3/29/2022    ASSESSMENT AND PLAN:  87 year old female with PMH of hypertension, HPL, medication non-compliance, ischemic stoke (Jan 2022) who was brought to our ED for evaluation of nausea, vomiting and diarrhea    Nausea, vomiting and diarrhea:  Acute febrile illness  -- CT showed findings consistent with enterocolitis  -- Symptomatic treatment with IVF, anti-emetics  -- Will send stool for enteric pathogens. Check stool for Cdiff given recent hospitalization    Normocytic anemia  -- Hgb of 8.8 noted. It was 13.7 in Jan 2022  -- Check FOBT.   -- Consult GI    Acute cystitis  -- CT showed findings consistent with cystitis  -- Start rocephin awaiting U/C report    Hypertensive urgency:  -- /115. Patient reported to be non-complaint with her meds in the past  -- Resume home meds. IV hydralazine prn    Recent ischemic stroke:  -- Cont with statin. Hold aspirin and plavix due to drop in hgb    Disposition:  -Anticipated Length of Stay in midnights and medical necessity (including a midnight in the Emergency Department after triage if applicable): >2      CHIEF COMPLAINT:  Nausea, vomiting and diarrhea    HISTORY OF PRESENTING ILLNESS:  87 year old female with PMH of hypertension, HPL, CKD-3, medication non-compliance, ischemic stoke (Jan 2022) who was brought to our ED for evaluation of nausea, vomiting and diarrhea    Patient reports feeling ill for the last 24 hours. She reports she went to a birthday party about 2 days ago. Yesterday, she didn't fell well, and started to have some nausea. She vomited multiple times, and also had diarrhea multiple times. She didn't look at the stool. It was watery, but unsure it was melanotic or not. Denies any hematemesis. Endorses having some abdominal discomfort. Denies urinary frequency or dysuria. Reports  feeling feverish, but didn't take the temperature at home.     PMH/PSH:  Patient Active Problem List   Diagnosis     Hypothyroidism     Hyperlipidemia     Anxiety     Essential hypertension     Chronic kidney disease, stage III (moderate) (H)     Lumbar Spondylosis     Lower Back Pain     Osteoporosis     Fatigue     Primary osteoarthritis of both knees     Sternal mass     Pain of left sternoclavicular joint     Ischemic stroke (H)     Gastroenteritis     Malaise     Urinary tract infection without hematuria, site unspecified     Anemia, unspecified type       Past Medical History:   Diagnosis Date     Anxiety      CKD (chronic kidney disease), stage III (H)      Depression      HLD (hyperlipidemia)      HTN (hypertension)      Hypothyroidism      Lumbar spondylosis      Osteoporosis         Past Surgical History:   Procedure Laterality Date      REMOVAL OF TONSILS,<11 Y/O      Description: Tonsillectomy;  Recorded: 05/22/2014;     [facility] REMOVAL OF OVARY(S)      Description: Oophorectomy;  Recorded: 05/22/2014;     [facility] TOTAL ABDOM HYSTERECTOMY      Description: Hysterectomy;  Recorded: 05/22/2014;     [facility] TOTAL ABDOM HYSTERECTOMY      Description: Total Abdominal Hysterectomy;  Recorded: 05/22/2014;          ALLERGIES:  Allergies   Allergen Reactions     Seafood Unknown       MEDICATIONS:  Reviewed.  No current facility-administered medications for this encounter.     Current Outpatient Medications   Medication     aspirin (ASA) 81 MG EC tablet     atorvastatin (LIPITOR) 40 MG tablet     clopidogrel (PLAVIX) 75 MG tablet     lisinopril (ZESTRIL) 10 MG tablet     metoprolol tartrate (LOPRESSOR) 25 MG tablet     sertraline (ZOLOFT) 100 MG tablet     No current facility-administered medications for this encounter.    Current Outpatient Medications:      aspirin (ASA) 81 MG EC tablet, Take 1 tablet (81 mg) by mouth daily, Disp: 30 tablet, Rfl: 0     atorvastatin (LIPITOR) 40 MG tablet, Take 1 tablet (40 mg) by mouth  "every evening, Disp: 90 tablet, Rfl: 1     clopidogrel (PLAVIX) 75 MG tablet, Take 1 tablet (75 mg) by mouth daily, Disp: 90 tablet, Rfl: 1     lisinopril (ZESTRIL) 10 MG tablet, Take 1 tablet (10 mg) by mouth daily, Disp: 30 tablet, Rfl: 0     metoprolol tartrate (LOPRESSOR) 25 MG tablet, Take 1 tablet (25 mg) by mouth 2 times daily, Disp: 180 tablet, Rfl: 1     sertraline (ZOLOFT) 100 MG tablet, Take 1 tablet (100 mg) by mouth daily, Disp: 30 tablet, Rfl: 0   Scheduled Meds:  Continuous Infusions:  PRN Meds:.    SOCIAL HISTORY:  Social History     Socioeconomic History     Marital status:      Spouse name: Not on file     Number of children: Not on file     Years of education: Not on file     Highest education level: Not on file   Occupational History     Not on file   Tobacco Use     Smoking status: Never Smoker     Smokeless tobacco: Never Used   Substance and Sexual Activity     Alcohol use: No     Drug use: Not on file     Sexual activity: Not on file   Other Topics Concern     Not on file   Social History Narrative     Not on file     Social Determinants of Health     Financial Resource Strain: Not on file   Food Insecurity: Not on file   Transportation Needs: Not on file   Physical Activity: Not on file   Stress: Not on file   Social Connections: Not on file   Intimate Partner Violence: Not on file   Housing Stability: Not on file       FAMILY HISTORY:  Reviewed and is not pertinent to this admission    ROS:  12 point review of systems reviewed and is negative except for what has already been mentioned in HPI.       PHYSICAL EXAM:  GENRL:  Not in acute distress   BP (!) 213/98   Pulse 86   Temp 98.5  F (36.9  C) (Oral)   Resp 25   Ht 1.626 m (5' 4\")   Wt 68 kg (150 lb)   SpO2 90%   BMI 25.75 kg/m    No intake/output data recorded.  I/O this shift:  In: 1100 [IV Piggyback:1100]  Out: -   HEENT: NC/AT      Eyes-  Pupil round and reactive to light bilaterally       Neck- supple, no JVP elevation, "       Sclera- anicteric      Oropharynx- dry and pink  CHEST: Clear to auscultation bilateral anteriorly, no ronchi or wheezing  HEART: S1S2 normal, regular.   ABDMN: Soft. Non--distended. Mild diffuse tenderness No guarding or rigidity. Bowel sounds- active  EXTRM: No pedal edema   INTGM: No skin rash, no cyanosis or clubbing  MUSCULOSKELETAL: no joint tenderness or swelling on upper and lower extremities  NEURO: Alert and awake. Cranial nerves II-XII grossly intact. No focal neurological deficit.  PSYCH:     GENITOURINARY:       DIAGNOSTIC DATA:    Recent Labs   Lab 03/29/22  1616   WBC 7.7   HGB 8.8*   HCT 29.0*          Recent Labs   Lab 03/29/22  1616      CO2 23   BUN 17       Recent Labs   Lab 03/29/22  1616   INR 1.03       Recent Labs   Lab 03/29/22  1616      CO2 23   BUN 17   ALBUMIN 3.3*   ALKPHOS 53   ALT <9   AST 14       [unfilled]    CT Abdomen Pelvis w Contrast    Result Date: 3/29/2022  EXAM: CT ABDOMEN PELVIS W CONTRAST LOCATION: [facility] DATE/TIME: 3/29/2022 6:29 PM INDICATION: Nausea/vomiting. COMPARISON: Chest CT 06/18/2019. TECHNIQUE: CT scan of the abdomen and pelvis was performed following injection of IV contrast. Multiplanar reformats were obtained. Dose reduction techniques were used. CONTRAST: isovue 370 75ml FINDINGS: LOWER CHEST: Moderate hiatus hernia. Mild basilar bronchiectasis. Coronary calcifications. HEPATOBILIARY: Few stable incidental small hepatic cysts requiring no routine follow-up. Borderline common bile duct enlargement at 8 mm. No obvious opaque gallstones. PANCREAS: Irregular or clustered cystic change along the pancreatic head measuring roughly 12 x 18 mm (series 3, image 62). SPLEEN: Stable incidental 10 mm splenic hypodensity. ADRENAL GLANDS: Stable mild left adrenal thickening. KIDNEYS/BLADDER: Incidental renal cortical and parapelvic cysts requiring no routine follow-up. No hydronephrosis. Slight urinary bladder  wall hyperenhancement and surrounding stranding. BOWEL: Extensive colonic diverticulosis pronounced in the sigmoid colon. Mild liquid colonic stool. Regions of mildly asymmetric small bowel wall hyperenhancement. Decompressed stomach. LYMPH NODES: No adenopathy. VASCULATURE: Tortuous and mildly atherosclerotic aorta without focal aneurysm. PELVIC ORGANS: Hysterectomy. MUSCULOSKELETAL: Bony demineralization and degenerative changes. Thoracolumbar scoliosis.     IMPRESSION: 1.  Few regions of asymmetric small bowel wall hyperenhancement, exaggerated by decompression. Additional mild liquid colonic stool. Findings may be sequela of an enterocolitis. 2.  Colonic diverticulosis without definitive diverticulitis. 3.  Moderate hiatus hernia. 4.  Slight urinary bladder wall hyperenhancement may reflect cystitis. 5.  Small amount of free pelvic fluid. No free air. No abscess. 6.  Cystic change in the pancreatic head up to roughly 18 mm, possibly IPMN formation. Recommend 12 month follow-up CT or MRI per guidelines below. 7.  Other findings in the report. REFERENCE: International Consensus Guidelines for Management of IPMN and MCN of the Pancreas. Pancreatology 12 (2012) 183-197. Asymptomatic patient without high-risk stigmata of malignancy (obstructive jaundice with cystic lesion in head of pancreas, enhancing solid component within cyst, or main pancreatic duct greater than 10mm), and without worrisome features (cyst greater than 3 cm, thickened/enhancing cyst walls, main pancreatic duct 5-9 mm, mural nodule, or abrupt change in caliber of pancreatic duct with distal pancreatic atrophy). Size of largest cyst: Less than 1 cm: CT or MRI with contrast in 2-3 years. 1-2 cm: CT or MRI with contrast yearly for 2 years, then lengthen interval if no change. 2-3 cm: EUS in 3-6 months, then can alternate MRI with EUS as appropriate. Greater than 3 cm: Close surveillance alternating MRI with EUS every 3-6 months. Consider surgery in  young, fit patients. Consider Gastroenterology consultation for all categories of pancreatic cysts.      Patient's new lab studies reviewed personally.  Patient's new radiology reports reviewed personally.  I personally viewed and personally interpreted patient's EKG:     Note created using dragon voice recognition software.  Errors in spelling or words which seems out of context are unintentional.  Sounds alike errors may have escaped editing.     03/29/2022  Mack Friedman MD  HOSPITALIST, [facility]  PAGER NO. [phone]

## 2022-03-30 NOTE — PLAN OF CARE
Pt denies any abdominal pain, nausea, vomiting, or diarrhea overnight. No stool to collect for culture. Patient's daughter called this AM to check on patient and wanted to pass along to care team she feels patient is not able to manage caring for herself at home, she reports pt not eating well, falling frequently. Noted pt has case management consult as well as PT/OT ordered, not left regarding this.

## 2022-03-30 NOTE — PLAN OF CARE
Problem: Plan of Care - These are the overarching goals to be used throughout the patient stay.    Goal: Plan of Care Review/Shift Note  Description: The Plan of Care Review/Shift note should be completed every shift.  The Outcome Evaluation is a brief statement about your assessment that the patient is improving, declining, or no change.  This information will be displayed automatically on your shift note.  Outcome: Ongoing, Progressing   Goal Outcome Evaluation:        Pt is alert and orient x 4. Denies any pain at this time. V.S.S. Lungs are diminished and on room air. Pt denies any nausea or vomiting. No diarrhea. Pt is a SBA with walker to the bathroom. Pt is going to have EGD and colonoscopy tomorrow. Will continue to monitor.

## 2022-03-30 NOTE — CONSULTS
[facility] DIGESTIVE HEALTH CONSULTATION    Remedios Vincent   [address]  [facility] [address]  87 year old female  Admission Date/Time: 3/29/2022  4:02 PM    Primary Care Provider:  Edin Dailey    Requesting Physician: Yordan Verdin MD      CHIEF COMPLAINT:   I was asked to see Ms. Remedios Vincent by Dr. Yordan Verdin for evaluation of anemia, vomiting and diarrhea.    REASON FOR THE CONSULT:  Anemia, diarrhea, vomiting    HPI:     Remedios is an 88 yo woman with CVA on DAPT, CKD, hypothyroidism, and anxiety who presents with complaint of vomiting and diarrhea. She felt well this weekend and visited with grandchildren on Sunday. Ate a small meal there. Felt exhausted on Monday. On Tuesday (yesterday) she had diarrhea and emesis multiple times throughout the day and could not take much PO. This prompted family to bring her to the ER.    Diarrhea was yellow/green without any melena or hematochezia.  Emesis was bilious, no coffee ground material or blood.  Was having abdominal cramping, now resolved.  Does not believe any children were sick. No new foods or meds.    In the ER she was noted to be anemic.  CT showed cystitis, pancreatic cyst, some mild small bowel hyperenhancement, and colonic diverticulosis, but otherwise nothing acute.    Today she feels well. No pain. No further diarrhea or emesis since admission.       REVIEW OF SYSTEMS: 13 point review of systems is negative except that noted above.    PAST MEDICAL HISTORY:   Past Medical History:   Diagnosis Date     Anxiety      CKD (chronic kidney disease), stage III (H)      Depression      HLD (hyperlipidemia)      HTN (hypertension)      Hypothyroidism      Lumbar spondylosis      Osteoporosis        PAST SURGICAL HISTORY:   Past Surgical History:   Procedure Laterality Date     [facility] REMOVAL OF TONSILS,<13 Y/O      Description: Tonsillectomy;  Recorded: 05/22/2014;     [facility] REMOVAL OF OVARY(S)      Description: Oophorectomy;  Recorded:  05/22/2014;     [facility] TOTAL ABDOM HYSTERECTOMY      Description: Hysterectomy;  Recorded: 05/22/2014;     [facility] TOTAL ABDOM HYSTERECTOMY      Description: Total Abdominal Hysterectomy;  Recorded: 05/22/2014;       FAMILY HISTORY: No family history on file. no gi malignancy or IBD    SOCIAL HISTORY:   Social History     Tobacco Use     Smoking status: Never Smoker     Smokeless tobacco: Never Used   Substance Use Topics     Alcohol use: No        MEDS:  Medications Prior to Admission   Medication Sig Dispense Refill Last Dose     aspirin (ASA) 81 MG EC tablet Take 1 tablet (81 mg) by mouth daily 30 tablet 0 3/28/2022 at Unknown time     atorvastatin (LIPITOR) 40 MG tablet Take 1 tablet (40 mg) by mouth every evening 90 tablet 1 3/28/2022 at Unknown time     clopidogrel (PLAVIX) 75 MG tablet Take 1 tablet (75 mg) by mouth daily 90 tablet 1 3/28/2022 at Unknown time     lisinopril (ZESTRIL) 10 MG tablet Take 1 tablet (10 mg) by mouth daily 30 tablet 0 3/28/2022 at Unknown time     metoprolol tartrate (LOPRESSOR) 25 MG tablet Take 1 tablet (25 mg) by mouth 2 times daily 180 tablet 1 3/28/2022 at Unknown time     sertraline (ZOLOFT) 100 MG tablet Take 1 tablet (100 mg) by mouth daily 30 tablet 0 3/28/2022 at Unknown time       ALLERGIES/SENSITIVITIES: Seafood    MEDICATIONS:  No current outpatient medications on file.       PHYSICAL EXAM:  Temp:  [97.8  F (36.6  C)-99  F (37.2  C)] 99  F (37.2  C)  Pulse:  [] 76  Resp:  [16-31] 18  BP: (105-230)/() 122/58  SpO2:  [90 %-98 %] 91 %  Body mass index is 25.75 kg/m .  GEN: Well developed, well nourished 87 year old in no acute distress.  HEENT: sclera anicteric, moist mucous membranes.   LYMPH: No cervical lymphadenopathy  PULM: Nonlabored breathing. Breath sounds equal.   CARDIO: Regular rate  GI: Non-distended. Soft.  Non-tender to palpation.  No guarding. No rebound tenderness.  EXT: warm, no lower extremity edema  NEURO: Alert. No focal defects.   PSYCH: Mental  status appropriate, mood and affect normal.    SKIN: No rashes  MSK: No joint abnormalities    LABORATORY DATA:  CBC RESULTS:   Recent Labs   Lab Test 03/30/22  0614   WBC 5.1   RBC 2.67*   HGB 7.3*   HCT 24.2*   MCV 91   MCH 27.3   MCHC 30.2*   RDW 14.7           CMP Results:   Recent Labs   Lab Test 03/30/22  0614 03/29/22  1616    137   POTASSIUM 4.2 4.4   CHLORIDE 108* 107   CO2 21* 23   ANIONGAP 8 7    120   BUN 13 17   CR 0.88 0.93   BILITOTAL  --  0.3   ALKPHOS  --  53   ALT  --  <9   AST  --  14        INR Results:   Recent Labs   Lab Test 03/29/22  1616   INR 1.03          RELEVANT IMAGING:      EXAM: CT ABDOMEN PELVIS W CONTRAST  LOCATION: [facility]  DATE/TIME: 3/29/2022 6:29 PM     INDICATION: Nausea/vomiting.  COMPARISON: Chest CT 06/18/2019.  TECHNIQUE: CT scan of the abdomen and pelvis was performed following injection of IV contrast. Multiplanar reformats were obtained. Dose reduction techniques were used.  CONTRAST: isovue 370 75ml     FINDINGS:   LOWER CHEST: Moderate hiatus hernia. Mild basilar bronchiectasis. Coronary calcifications.     HEPATOBILIARY: Few stable incidental small hepatic cysts requiring no routine follow-up. Borderline common bile duct enlargement at 8 mm. No obvious opaque gallstones.     PANCREAS: Irregular or clustered cystic change along the pancreatic head measuring roughly 12 x 18 mm (series 3, image 62).     SPLEEN: Stable incidental 10 mm splenic hypodensity.     ADRENAL GLANDS: Stable mild left adrenal thickening.     KIDNEYS/BLADDER: Incidental renal cortical and parapelvic cysts requiring no routine follow-up. No hydronephrosis. Slight urinary bladder wall hyperenhancement and surrounding stranding.     BOWEL: Extensive colonic diverticulosis pronounced in the sigmoid colon. Mild liquid colonic stool. Regions of mildly asymmetric small bowel wall hyperenhancement. Decompressed stomach.     LYMPH NODES: No  adenopathy.     VASCULATURE: Tortuous and mildly atherosclerotic aorta without focal aneurysm.     PELVIC ORGANS: Hysterectomy.     MUSCULOSKELETAL: Bony demineralization and degenerative changes. Thoracolumbar scoliosis.                                                                      IMPRESSION:      1.  Few regions of asymmetric small bowel wall hyperenhancement, exaggerated by decompression. Additional mild liquid colonic stool. Findings may be sequela of an enterocolitis.     2.  Colonic diverticulosis without definitive diverticulitis.     3.  Moderate hiatus hernia.     4.  Slight urinary bladder wall hyperenhancement may reflect cystitis.     5.  Small amount of free pelvic fluid. No free air. No abscess.     6.  Cystic change in the pancreatic head up to roughly 18 mm, possibly IPMN formation. Recommend 12 month follow-up CT or MRI per guidelines below.     7.  Other findings in the report.    ASSESSMENT:     Remedios is an 88 yo woman with CVA on DAPT, CKD, hypothyroidism, and anxiety who presents with complaint of vomiting and diarrhea that appears to be self-limited and now resolved, found to have anemia.    As above, GI symptoms have resolved. Almost certainly a viral or toxin-mediated gastroenteritis. Unlikely that pathogen will be found, nor should we be looking very closely given clinical resolution of symptoms.    The anemia is of greater concern to me. HGB began to drop after initiation of DAPT in January after CVA. Highly suggestive of GI tract blood loss. No sign of hemolysis. Unlikely for hematopoiesis issue to start this suddenly. Discussed with Remedios and she understood plan below for GI evaluation for anemia.    PLAN:    1) Anemia  -EGD and colonoscopy. Scheduled in OR on 3/31. Prep today. Clear liquid diet. NPO mn.  -OK to check HGB daily.  -Daily PPI ok for now as this does not seem like active GIB.  -Rec holding ASA/Plavix today. Will restart asap given recency of CVA.    2)  Gastroenteritis  -Clinically resolved. No further workup recommended unless symptoms recur.    3) Pancreatic cyst  -Monitor radiographically. 12 month MRI.    4) Cystitis  -Antibiotics per primary team.               Burt Veras MD  Thank you for the opportunity to participate in the care of this patient.   Please feel free to call me with any questions or concerns.  Phone number [phone].            CC: [facility], [facility]Edin lawrence

## 2022-03-30 NOTE — PLAN OF CARE
"  Problem: Plan of Care - These are the overarching goals to be used throughout the patient stay.    Goal: Plan of Care Review/Shift Note  Description: The Plan of Care Review/Shift note should be completed every shift.  The Outcome Evaluation is a brief statement about your assessment that the patient is improving, declining, or no change.  This information will be displayed automatically on your shift note.  3/29/2022 2304 by Ross Barnes RN  Outcome: Ongoing, Progressing  3/29/2022 2304 by Ross Barnes RN  Outcome: Ongoing, Progressing  Flowsheets (Taken 3/29/2022 2304)  Plan of Care Reviewed With: patient  Goal: Patient-Specific Goal (Individualized)  Description: You can add care plan individualizations to a care plan. Examples of Individualization might be:  \"Parent requests to be called daily at 9am for status\", \"I have a hard time hearing out of my right ear\", or \"Do not touch me to wake me up as it startles me\".  3/29/2022 2304 by Ross Barnes RN  Outcome: Ongoing, Progressing  3/29/2022 2304 by Ross Barnes RN  Outcome: Ongoing, Progressing  Goal: Absence of Hospital-Acquired Illness or Injury  3/29/2022 2304 by Ross Barnes RN  Outcome: Ongoing, Progressing  3/29/2022 2304 by Ross Barnes RN  Outcome: Ongoing, Progressing  Intervention: Identify and Manage Fall Risk  Recent Flowsheet Documentation  Taken 3/29/2022 2257 by Ross Barnes RN  Safety Promotion/Fall Prevention: activity supervised  Goal: Optimal Comfort and Wellbeing  3/29/2022 2304 by Ross Barnes RN  Outcome: Ongoing, Progressing  3/29/2022 2304 by Ross Barnes RN  Outcome: Ongoing, Progressing  Goal: Readiness for Transition of Care  3/29/2022 2304 by Ross Barnes RN  Outcome: Ongoing, Progressing  3/29/2022 2304 by Ross Barnes RN  Outcome: Ongoing, Progressing  Intervention: Mutually Develop Transition Plan  Recent Flowsheet Documentation  Taken 3/29/2022 2300 by Ross Barnes RN  Equipment Currently Used at Home: " none     Problem: Risk for Delirium  Goal: Optimal Coping  3/29/2022 2304 by Ross Barnes RN  Outcome: Ongoing, Progressing  3/29/2022 2304 by Ross Barnes RN  Outcome: Ongoing, Progressing  Goal: Improved Behavioral Control  3/29/2022 2304 by Ross Barnes RN  Outcome: Ongoing, Progressing  3/29/2022 2304 by Ross Barnes RN  Outcome: Ongoing, Progressing  Goal: Improved Attention and Thought Clarity  3/29/2022 2304 by Ross Barnes RN  Outcome: Ongoing, Progressing  3/29/2022 2304 by Ross Barnes RN  Outcome: Ongoing, Progressing  Goal: Improved Sleep  3/29/2022 2304 by Ross Barnes RN  Outcome: Ongoing, Progressing  3/29/2022 2304 by Ross Barnes RN  Outcome: Ongoing, Progressing     Problem: Nausea and Vomiting  Goal: Fluid and Electrolyte Balance  Outcome: Ongoing, Progressing     Problem: Infection  Goal: Absence of Infection Signs and Symptoms  Outcome: Ongoing, Progressing   Goal Outcome Evaluation:    Plan of Care Reviewed With: patient

## 2022-03-30 NOTE — PROGRESS NOTES
Occupational Therapy       03/30/22 0748   Quick Adds   Type of Visit Initial Occupational Therapy Evaluation   Living Environment   People in Home alone  (daughters stop by 1-2x a week)   Current Living Arrangements condominium   Home Accessibility no concerns   Living Environment Comments Patient independent with ADLs   Self-Care   Equipment Currently Used at Home walker, rolling   Fall history within last six months yes   Number of times patient has fallen within last six months 2   Activity/Exercise/Self-Care Comment walk-in shower, with shower chair and grab bars.    Instrumental Activities of Daily Living (IADL)   IADL Comments Patient's states her daugher helps with cleaning otherwise patient independent with IADLs.   General Information   Onset of Illness/Injury or Date of Surgery 03/29/22   Referring Physician Mack Friedman MBBS   Patient/Family Therapy Goal Statement (OT) none stated   Existing Precautions/Restrictions fall   Cognitive Status Examination   Orientation Status   (oriented to place, month, year not day of week)   Bed Mobility   Bed Mobility supine-sit   Supine-Sit [place] (Bed Mobility) minimum assist (75% patient effort)   Assistive Device (Bed Mobility) bed rails   Comment (Bed Mobility) HOB elevated   Transfers   Transfers sit-stand transfer;toilet transfer   Sit-Stand Transfer   Sit-Stand [place] (Transfers) minimum assist (75% patient effort)   Toilet Transfer   Type (Toilet Transfer) sit-stand   [place] Level (Toilet Transfer) moderate assist (50% patient effort)   Assistive Device (Toilet Transfer) grab bars/safety frame   Clinical Impression   Criteria for Skilled Therapeutic Interventions Met (OT) Yes, treatment indicated   OT Diagnosis Decreased ADL independence   Influenced by the following impairments nausea/vomitting   OT Problem List-Impairments impacting ADL problems related to;activity tolerance impaired;balance;cognition;strength   Assessment of  Occupational Performance 3-5 Performance Deficits   Identified Performance Deficits dressing, toileting, trsfs   Planned Therapy Interventions (OT) ADL retraining;balance training;bed mobility training;cognition;transfer training   Clinical Decision Making Complexity (OT) moderate complexity   Risk & Benefits of therapy have been explained evaluation/treatment results reviewed   OT Discharge Planning   OT Discharge Recommendation (DC Rec) Transitional Care Facility;home with assist   OT Rationale for DC Rec Patient will need assist with all ADLs including bathing, bed mobility, trsfs, dressing and toileting.   Total Evaluation Time (Minutes)   Total Evaluation Time (Minutes) 12   OT Goals   Therapy Frequency (OT) Daily   OT Predicated Duration/Target Date for Goal Attainment 04/06/22   OT Goals Lower Body Dressing;Bed Mobility;Transfers;Toilet Transfer/Toileting   OT: Lower Body Dressing Supervision/stand-by assist   OT: Bed Mobility Supervision/stand-by assist   OT: Transfer Supervision/stand-by assist   OT: Toilet Transfer/Toileting Supervision/stand-by assist

## 2022-03-30 NOTE — UTILIZATION REVIEW
Admission Status; Secondary Review Determination   Under the authority of the Utilization Management Committee, the utilization review process indicated a secondary review on Remedios Vincent. The review outcome is based on review of the medical records, discussions with staff, and applying clinical experience noted on the date of the review.   (x) Inpatient Status Appropriate - This patient's medical care is consistent with medical management for inpatient care and reasonable inpatient medical practice.     RATIONALE FOR DETERMINATION   87 year old female with PMH of hypertension, HPL, medication non-compliance, ischemic stoke (Jan 2022) who was presented to the ED for evaluation of nausea, vomiting and diarrhea, admitted with acute anemia and gastroenteritis , GI consult, NPO, on IV fluid and IV protonix , plan for EGD/Colonoscopy     At the time of admission with the information available to the attending physician more than 2 nights Hospital complex care was anticipated, based on patient risk of adverse outcome if treated as outpatient and complex care required. Inpatient admission is appropriate based on the Medicare guidelines.   The information on this document is developed by the utilization review team in order for the business office to ensure compliance. This only denotes the appropriateness of proper admission status and does not reflect the quality of care rendered.   The definitions of Inpatient Status and Observation Status used in making the determination above are those provided in the CMS Coverage Manual, Chapter 1 and Chapter 6, section 70.4.   Sincerely,   Guero George MD  Utilization Review  Physician Advisor  [facility]

## 2022-03-30 NOTE — PLAN OF CARE
"  Problem: Plan of Care - These are the overarching goals to be used throughout the patient stay.    Goal: Plan of Care Review/Shift Note  Description: The Plan of Care Review/Shift note should be completed every shift.  The Outcome Evaluation is a brief statement about your assessment that the patient is improving, declining, or no change.  This information will be displayed automatically on your shift note.  Outcome: Ongoing, Progressing  Flowsheets (Taken 3/30/2022 1858)  Plan of Care Reviewed With:   patient   family  Goal: Patient-Specific Goal (Individualized)  Description: You can add care plan individualizations to a care plan. Examples of Individualization might be:  \"Parent requests to be called daily at 9am for status\", \"I have a hard time hearing out of my right ear\", or \"Do not touch me to wake me up as it startles me\".  Outcome: Ongoing, Progressing  Goal: Absence of Hospital-Acquired Illness or Injury  Outcome: Ongoing, Progressing  Intervention: Identify and Manage Fall Risk  Recent Flowsheet Documentation  Taken 3/30/2022 1600 by Ross Barnes RN  Safety Promotion/Fall Prevention: activity supervised  Intervention: Prevent Skin Injury  Recent Flowsheet Documentation  Taken 3/30/2022 1600 by Ross Barnes RN  Body Position: position changed independently  Intervention: Prevent and Manage VTE (Venous Thromboembolism) Risk  Recent Flowsheet Documentation  Taken 3/30/2022 1600 by Ross Barnes RN  Activity Management: activity adjusted per tolerance  Goal: Optimal Comfort and Wellbeing  Outcome: Ongoing, Progressing  Goal: Readiness for Transition of Care  Outcome: Ongoing, Progressing     Problem: Risk for Delirium  Goal: Optimal Coping  Outcome: Ongoing, Progressing  Goal: Improved Behavioral Control  Outcome: Ongoing, Progressing  Goal: Improved Attention and Thought Clarity  Outcome: Ongoing, Progressing  Goal: Improved Sleep  Outcome: Ongoing, Progressing     Problem: Nausea and Vomiting  Goal: " Fluid and Electrolyte Balance  Outcome: Ongoing, Progressing     Problem: Infection  Goal: Absence of Infection Signs and Symptoms  Outcome: Ongoing, Progressing   Goal Outcome Evaluation:    Plan of Care Reviewed With: patient, family

## 2022-03-30 NOTE — CONSULTS
Care Management Initial Consult    General Information  Assessment completed with: Patient,  (Remedios)  Type of CM/SW Visit: Offer D/C Planning    Primary Care Provider verified and updated as needed: Yes   Readmission within the last 30 days:        Reason for Consult: discharge planning  Advance Care Planning: Advance Care Planning Reviewed: questions answered          Communication Assessment  Patient's communication style: spoken language (English or Bilingual)    Hearing Difficulty or Deaf: no   Wear Glasses or Blind: no    Cognitive  Cognitive/Neuro/Behavioral: WDL                      Living Environment:   People in home: alone     Current living Arrangements: condominium      Able to return to prior arrangements: yes       Family/Social Support:  Care provided by: self  Provides care for: no one  Marital Status:              Description of Support System: Supportive, Involved    Support Assessment: Adequate family and caregiver support    Current Resources:   Patient receiving home care services: Yes     Community Resources: None  Equipment currently used at home: cane, quad, grab bar, toilet, grab bar, tub/shower, tub bench  Supplies currently used at home:      Employment/Financial:  Employment Status: retired        Financial Concerns:   NA          Lifestyle & Psychosocial Needs:  Social Determinants of Health     Tobacco Use: Low Risk      Smoking Tobacco Use: Never Smoker     Smokeless Tobacco Use: Never Used   Alcohol Use: Not on file   Financial Resource Strain: Not on file   Food Insecurity: Not on file   Transportation Needs: Not on file   Physical Activity: Not on file   Stress: Not on file   Social Connections: Not on file   Intimate Partner Violence: Not on file   Depression: Not at risk     PHQ-2 Score: 0   Housing Stability: Not on file       Functional Status:  Prior to admission patient needed assistance: - uses online shopping,  cleaning           Mental Health Status:  Mental Health  Status: No Current Concerns       Chemical Dependency Status:  Chemical Dependency Status: No Current Concerns             Values/Beliefs:  Spiritual, Cultural Beliefs, Nondenominational Practices, Values that affect care:       na          Additional Information:  SW met with pt in pt room, pt lives alone in senior [employer] building. Pt states she has friends in building and granddaughter Jil visits. Pt agreeable to have SW follow for discharge planning.   Pt daughter Flower is POA, [phone], SW spoke to pt daughter Isabel to obtain correct phone number for Flower.   Isabel stated she is the one who called 911 for pt, as pt was sick. Isabel states she did not believe pt should be left alone at home. Pt had trash in condo, not much food, and the place was not clean. She states pt was not taking good care of herself properly and having frequent falls. Referrals agreed to from medicare/gov list to [facility]. Referrals sent.   Pt requests [facility] choice.    JOSSELINE Mackay

## 2022-03-30 NOTE — PROGRESS NOTES
[facility]    Medicine Progress Note - Hospitalist Service       Date of Admission:  3/29/2022  Active Problems:    Gastroenteritis    Malaise    Urinary tract infection without hematuria, site unspecified    Anemia, unspecified type     Assessment & Plan          87 year old female with PMH of hypertension, HPL , ischemic stoke (Jan 2022) who was brought to our ED for evaluation of nausea, vomiting and diarrhea, found to have findings of enterocolitis, worsening anemia and UTI with hypertensive urgency.     Nausea, vomiting and diarrhea: Likely gastroenteritis/enterocolitis  CT showed enterocolitis, also has UTI  Normal WBC, lactic acid  Symptoms resolved, no nausea or vomiting or diarrhea since yesterday  If continued diarrhea check C. difficile and stool culture     Normocytic anemia  Hgb of 8.8 noted. It was 13.7 in Jan 2022  Decreased to 7.3.  No overt melena or hematemesis.  Start PPI  GI planning colonoscopy and EGD tomorrow  Check iron, B12, folate, peripheral smear, recheck hemoglobin later today, transfuse if less than 7     Acute cystitis   CT showed findings consistent with cystitis  -- Start rocephin awaiting U/C report     Hypertensive urgency:  /115.  On admission.  Patient says that she has been taking her medications  Much improved, continue lisinopril, metoprolol     Recent ischemic stroke:  Has residual right upper extremity weakness, stable.  Jan 2022  Continue statin, hold aspirin and plavix due to drop in hgb    Pancreatic head [id number] with GI     Disposition:  -Anticipated Length of Stay in midnights and medical necessity (including a midnight in the Emergency Department after triage if applicable): >2        Diet: Clear Liquid Diet No Red Food or Beverage - for certain tests    DVT Prophylaxis: Pneumatic Compression Devices  Bhatt Catheter: Not present  Central Lines: None  Code Status: Full Code      Disposition Plan   Expected Discharge: 04/01/2022    "2-3 days depending on colonoscopy and EGD results  Anticipated discharge location: home    Delays:     Procedure Pending (enter procedure & time in comments)            The patient's care was discussed with the Bedside Nurse, Care Coordinator/, Patient and Patient's Family. for total time 35 minutes with greater than 50% of total time spent in counseling and coordination of care.    TOMY DICKENS MD  Hospitalist Service  [facility]  Securely message with the Vocera Web Console (learn more here)  Text page via [employer] Paging/Directory        Clinically Significant Risk Factors Present on Admission              # Overweight: Estimated body mass index is 25.75 kg/m  as calculated from the following:    Height as of this encounter: 1.625 m (5' 3.98\").    Weight as of this encounter: 68 kg (149 lb 14.6 oz).      ______________________________________________________________________    Interval History   Remainder of 12 point review of systems negative except as noted below    Subjective:  Patient doing better.  No nausea or vomiting, abdominal pain or diarrhea since yesterday.  Has residual right arm weakness and a little bit of a right facial droop but no dysphagia since her stroke in January.  No fevers or chills.  No chest pain or shortness of breath.    Data reviewed today: I reviewed all medications, new labs and imaging results over the last 24 hours.     Physical Exam   Vital Signs: Temp: 98.4  F (36.9  C) Temp src: Oral BP: (!) 145/69 Pulse: 64   Resp: 20 SpO2: 94 % O2 Device: None (Room air)    Weight: 149 lbs 14.6 oz  Physical Exam:  Temp:  [97.8  F (36.6  C)-99  F (37.2  C)] 98.4  F (36.9  C)  Pulse:  [64-88] 64  Resp:  [18-20] 20  BP: (105-184)/(55-86) 145/69  SpO2:  [91 %-96 %] 94 %    BP (!) 145/69 (BP Location: Right arm)   Pulse 64   Temp 98.4  F (36.9  C) (Oral)   Resp 20   Ht 1.625 m (5' 3.98\")   Wt 68 kg (149 lb 14.6 oz)   SpO2 94%   BMI 25.75 kg/m    General " appearance: alert, appears stated age and cooperative  Head: Normocephalic, without obvious abnormality, atraumatic  Eyes: Clear conjuctiva  Neck: no JVD and supple, symmetrical, trachea midline  Lungs: clear to auscultation bilaterally  Heart: regular rate and rhythm, S1, S2 normal, no murmur, click, rub or gallop  Abdomen: soft, non-tender; bowel sounds normal; no masses,  no organomegaly  Extremities: Negative homans,   Skin: Skin color, texture, turgor normal. No rashes or lesions  Neurologic: Mental status: Alert, oriented, thought content appropriate  Sensory: normal  Motor: strength 3 out of 5 right  Slight right facial droop        Data   Recent Labs   Lab 03/30/22  1626 03/30/22  0614 03/29/22  1616   WBC 3.5* 5.1 7.7   HGB 7.2* 7.3* 8.8*   MCV 92 91 91    182 227   INR  --   --  1.03   NA  --  137 137   POTASSIUM  --  4.2 4.4   CHLORIDE  --  108* 107   CO2  --  21* 23   BUN  --  13 17   CR  --  0.88 0.93   ANIONGAP  --  8 7   LUBNA  --  7.4* 7.9*   GLC  --  102 120   ALBUMIN  --   --  3.3*   PROTTOTAL  --   --  6.3   BILITOTAL  --   --  0.3   ALKPHOS  --   --  53   ALT  --   --  <9   AST  --   --  14   LIPASE  --   --  47     No results found for this or any previous visit (from the past 24 hour(s)).

## 2022-03-31 ENCOUNTER — APPOINTMENT (OUTPATIENT)
Dept: OCCUPATIONAL THERAPY | Facility: HOSPITAL | Age: 87
DRG: 392 | End: 2022-03-31
Payer: COMMERCIAL

## 2022-03-31 VITALS
BODY MASS INDEX: 25.59 KG/M2 | TEMPERATURE: 97.8 F | DIASTOLIC BLOOD PRESSURE: 71 MMHG | RESPIRATION RATE: 18 BRPM | SYSTOLIC BLOOD PRESSURE: 150 MMHG | WEIGHT: 149.91 LBS | HEART RATE: 68 BPM | HEIGHT: 64 IN | OXYGEN SATURATION: 94 %

## 2022-03-31 LAB
BLD PROD TYP BPU: NORMAL
BLOOD COMPONENT TYPE: NORMAL
CALCIUM, IONIZED MEASURED: 1.14 MMOL/L (ref 1.11–1.3)
CODING SYSTEM: NORMAL
CROSSMATCH: NORMAL
HGB BLD-MCNC: 6.9 G/DL (ref 11.7–15.7)
HGB BLD-MCNC: 8.7 G/DL (ref 11.7–15.7)
ION CA PH 7.4: 1.09 MMOL/L (ref 1.11–1.3)
ISSUE DATE AND TIME: NORMAL
PATH REPORT.COMMENTS IMP SPEC: NORMAL
PATH REPORT.COMMENTS IMP SPEC: NORMAL
PATH REPORT.FINAL DX SPEC: NORMAL
PATH REPORT.MICROSCOPIC SPEC OTHER STN: NORMAL
PATH REPORT.RELEVANT HX SPEC: NORMAL
PH: 7.32 (ref 7.35–7.45)
UNIT ABO/RH: NORMAL
UNIT NUMBER: NORMAL
UNIT STATUS: NORMAL
UNIT TYPE ISBT: 5100

## 2022-03-31 PROCEDURE — 250N000009 HC RX 250: Performed by: FAMILY MEDICINE

## 2022-03-31 PROCEDURE — 99238 HOSP IP/OBS DSCHRG MGMT 30/<: CPT | Performed by: FAMILY MEDICINE

## 2022-03-31 PROCEDURE — C9113 INJ PANTOPRAZOLE SODIUM, VIA: HCPCS | Performed by: FAMILY MEDICINE

## 2022-03-31 PROCEDURE — 85060 BLOOD SMEAR INTERPRETATION: CPT | Performed by: PATHOLOGY

## 2022-03-31 PROCEDURE — 250N000011 HC RX IP 250 OP 636: Performed by: FAMILY MEDICINE

## 2022-03-31 PROCEDURE — 250N000013 HC RX MED GY IP 250 OP 250 PS 637: Performed by: FAMILY MEDICINE

## 2022-03-31 PROCEDURE — 97530 THERAPEUTIC ACTIVITIES: CPT | Mod: GO

## 2022-03-31 PROCEDURE — 82330 ASSAY OF CALCIUM: CPT | Performed by: FAMILY MEDICINE

## 2022-03-31 PROCEDURE — 97535 SELF CARE MNGMENT TRAINING: CPT | Mod: GO

## 2022-03-31 PROCEDURE — 36415 COLL VENOUS BLD VENIPUNCTURE: CPT | Performed by: FAMILY MEDICINE

## 2022-03-31 PROCEDURE — P9016 RBC LEUKOCYTES REDUCED: HCPCS | Performed by: FAMILY MEDICINE

## 2022-03-31 PROCEDURE — 85018 HEMOGLOBIN: CPT | Performed by: INTERNAL MEDICINE

## 2022-03-31 PROCEDURE — 250N000013 HC RX MED GY IP 250 OP 250 PS 637: Performed by: INTERNAL MEDICINE

## 2022-03-31 PROCEDURE — 258N000003 HC RX IP 258 OP 636: Performed by: FAMILY MEDICINE

## 2022-03-31 PROCEDURE — 85018 HEMOGLOBIN: CPT | Performed by: FAMILY MEDICINE

## 2022-03-31 RX ORDER — FOLIC ACID 1 MG/1
1 TABLET ORAL DAILY
Qty: 30 TABLET | Refills: 0 | Status: SHIPPED | OUTPATIENT
Start: 2022-04-01 | End: 2023-03-24

## 2022-03-31 RX ORDER — FOLIC ACID 1 MG/1
1 TABLET ORAL DAILY
Status: DISCONTINUED | OUTPATIENT
Start: 2022-03-31 | End: 2022-03-31 | Stop reason: HOSPADM

## 2022-03-31 RX ORDER — CYANOCOBALAMIN 1000 UG/ML
1000 INJECTION, SOLUTION INTRAMUSCULAR; SUBCUTANEOUS
Status: DISCONTINUED | OUTPATIENT
Start: 2022-03-31 | End: 2022-03-31 | Stop reason: HOSPADM

## 2022-03-31 RX ORDER — MAGNESIUM 200 MG
1000 TABLET ORAL DAILY
Status: DISCONTINUED | OUTPATIENT
Start: 2022-03-31 | End: 2022-03-31 | Stop reason: HOSPADM

## 2022-03-31 RX ORDER — CEFDINIR 300 MG/1
300 CAPSULE ORAL EVERY 12 HOURS
Qty: 8 CAPSULE | Refills: 0 | Status: SHIPPED | OUTPATIENT
Start: 2022-03-31 | End: 2022-04-04

## 2022-03-31 RX ORDER — PANTOPRAZOLE SODIUM 40 MG/1
40 TABLET, DELAYED RELEASE ORAL
Qty: 30 TABLET | Refills: 0 | Status: SHIPPED | OUTPATIENT
Start: 2022-04-01 | End: 2023-03-24

## 2022-03-31 RX ORDER — CALCIUM GLUCONATE 20 MG/ML
1 INJECTION, SOLUTION INTRAVENOUS ONCE
Status: COMPLETED | OUTPATIENT
Start: 2022-03-31 | End: 2022-03-31

## 2022-03-31 RX ORDER — LIDOCAINE 40 MG/G
CREAM TOPICAL
Status: CANCELLED | OUTPATIENT
Start: 2022-03-31

## 2022-03-31 RX ORDER — PANTOPRAZOLE SODIUM 40 MG/1
40 TABLET, DELAYED RELEASE ORAL
Status: DISCONTINUED | OUTPATIENT
Start: 2022-04-01 | End: 2022-03-31 | Stop reason: HOSPADM

## 2022-03-31 RX ORDER — SODIUM CHLORIDE, SODIUM LACTATE, POTASSIUM CHLORIDE, CALCIUM CHLORIDE 600; 310; 30; 20 MG/100ML; MG/100ML; MG/100ML; MG/100ML
INJECTION, SOLUTION INTRAVENOUS CONTINUOUS
Status: CANCELLED | OUTPATIENT
Start: 2022-03-31

## 2022-03-31 RX ORDER — MAGNESIUM 200 MG
1000 TABLET ORAL DAILY
Qty: 30 TABLET | Refills: 0 | Status: SHIPPED | OUTPATIENT
Start: 2022-04-01 | End: 2023-09-09

## 2022-03-31 RX ORDER — CEFDINIR 300 MG/1
300 CAPSULE ORAL EVERY 12 HOURS SCHEDULED
Status: DISCONTINUED | OUTPATIENT
Start: 2022-03-31 | End: 2022-03-31 | Stop reason: HOSPADM

## 2022-03-31 RX ADMIN — PANTOPRAZOLE SODIUM 40 MG: 40 INJECTION, POWDER, FOR SOLUTION INTRAVENOUS at 09:01

## 2022-03-31 RX ADMIN — HYDRALAZINE HYDROCHLORIDE 10 MG: 20 INJECTION INTRAMUSCULAR; INTRAVENOUS at 10:21

## 2022-03-31 RX ADMIN — Medication 1000 MCG: at 13:11

## 2022-03-31 RX ADMIN — METOPROLOL TARTRATE 25 MG: 25 TABLET, FILM COATED ORAL at 08:57

## 2022-03-31 RX ADMIN — LISINOPRIL 10 MG: 5 TABLET ORAL at 08:57

## 2022-03-31 RX ADMIN — FOLIC ACID 1 MG: 1 TABLET ORAL at 13:11

## 2022-03-31 RX ADMIN — SERTRALINE HYDROCHLORIDE 100 MG: 50 TABLET ORAL at 08:57

## 2022-03-31 RX ADMIN — CYANOCOBALAMIN 1000 MCG: 1000 INJECTION, SOLUTION INTRAMUSCULAR; SUBCUTANEOUS at 13:10

## 2022-03-31 RX ADMIN — CALCIUM GLUCONATE 1 G: 20 INJECTION, SOLUTION INTRAVENOUS at 16:15

## 2022-03-31 RX ADMIN — IRON SUCROSE 200 MG: 20 INJECTION, SOLUTION INTRAVENOUS at 13:10

## 2022-03-31 ASSESSMENT — ACTIVITIES OF DAILY LIVING (ADL)
ADLS_ACUITY_SCORE: 9
ADLS_ACUITY_SCORE: 8
ADLS_ACUITY_SCORE: 9
ADLS_ACUITY_SCORE: 8
ADLS_ACUITY_SCORE: 9
ADLS_ACUITY_SCORE: 8
ADLS_ACUITY_SCORE: 9
ADLS_ACUITY_SCORE: 8

## 2022-03-31 NOTE — PLAN OF CARE
Occupational Therapy Discharge Summary    Reason for therapy discharge:    All goals and outcomes met, no further needs identified.    Progress towards therapy goal(s). See goals on Care Plan in [facility] electronic health record for goal details.  Goals met    Therapy recommendation(s):    No further therapy is recommended.    Goal Outcome Evaluation:

## 2022-03-31 NOTE — PLAN OF CARE
"Goal Outcome Evaluation:    Plan of Care Reviewed With: patient, daughter, grandchild(lula)   Pt discharging home [facility] home dione care, family at bedside aware of POC and discharge instruction, meds per MAR, post one unit of PRBC transfusion, repeat hgb 8.7, pt aware, Follow-up appt with primary scheduled, meds to pt preferred pharmacy, questions answered, pt/family verbalize understanding of discharge istruction and Follow-up.  Problem: Plan of Care - These are the overarching goals to be used throughout the patient stay.    Goal: Plan of Care Review/Shift Note  Description: The Plan of Care Review/Shift note should be completed every shift.  The Outcome Evaluation is a brief statement about your assessment that the patient is improving, declining, or no change.  This information will be displayed automatically on your shift note.  Outcome: Met  Flowsheets (Taken 3/31/2022 1712)  Plan of Care Reviewed With:   patient   daughter   grandchild(lula)  Goal: Patient-Specific Goal (Individualized)  Description: You can add care plan individualizations to a care plan. Examples of Individualization might be:  \"Parent requests to be called daily at 9am for status\", \"I have a hard time hearing out of my right ear\", or \"Do not touch me to wake me up as it startles me\".  Outcome: Met  Goal: Absence of Hospital-Acquired Illness or Injury  Outcome: Met  Intervention: Identify and Manage Fall Risk  Recent Flowsheet Documentation  Taken 3/31/2022 1455 by Judith Babb, RN  Safety Promotion/Fall Prevention:   patient and family education   nonskid shoes/slippers when out of bed   mobility aid in reach  Taken 3/31/2022 0900 by Judith Babb, RN  Safety Promotion/Fall Prevention:   patient and family education   nonskid shoes/slippers when out of bed   mobility aid in reach  Intervention: Prevent Skin Injury  Recent Flowsheet Documentation  Taken 3/31/2022 1455 by Judith Babb, RN  Body Position: position changed independently  Taken " 3/31/2022 0900 by Judith Babb, RN  Body Position: position changed independently  Intervention: Prevent and Manage VTE (Venous Thromboembolism) Risk  Recent Flowsheet Documentation  Taken 3/31/2022 1300 by Judith Babb, RN  Activity Management: activity encouraged  Goal: Optimal Comfort and Wellbeing  Outcome: Met  Goal: Readiness for Transition of Care  Outcome: Met     Problem: Risk for Delirium  Goal: Optimal Coping  Outcome: Met  Goal: Improved Behavioral Control  Outcome: Met  Goal: Improved Attention and Thought Clarity  Outcome: Met  Goal: Improved Sleep  Outcome: Met     Problem: Nausea and Vomiting  Goal: Fluid and Electrolyte Balance  Outcome: Met     Problem: Infection  Goal: Absence of Infection Signs and Symptoms  Outcome: Met

## 2022-03-31 NOTE — PROGRESS NOTES
"  GASTROENTEROLOGY PROGRESS NOTE     SUBJECTIVE   Plans were in place for egd and colonoscopy for further evaluation of anemia, but the patient refused prep and is no longer interested in pursuing procedures.   Hgb down to 6.9 this AM-- RBCs ordered by primary team.     The patient is not interested in colonoscopy as she believes the prep would be too difficult. We discussed the possibility of doing EGD but the patient clearly states that she does not want invasive procedures at this time. Reviewed importance of frequent hgb checks through primary care as she may need additional RBC transfusions in the future.     Tolerating clear liquids.Wants to try solids. No bm since admission. Has not had return of vomiting.      OBJECTIVE     Vitals Blood pressure (!) 182/79, pulse 67, temperature 98.1  F (36.7  C), temperature source Oral, resp. rate 18, height 1.625 m (5' 3.98\"), weight 68 kg (149 lb 14.6 oz), SpO2 94 %.          Physical Exam   General: awake, alert, responds appropriately    Cardiovascular: S1S2, no edema    Chest: lungs are clear     Abdomen: +bs, soft, not tender    Neurologic: grossly intact        LABORATORY    ELECTROLYTE PANEL   Recent Labs   Lab 03/30/22  0614 03/29/22  1616    137   POTASSIUM 4.2 4.4   CHLORIDE 108* 107   CO2 21* 23    120   CR 0.88 0.93   BUN 13 17      HEMATOLOGY PANEL   Recent Labs   Lab 03/31/22  0600 03/30/22  1626 03/30/22  0614 03/29/22  1616   HGB 6.9* 7.2* 7.3* 8.8*   MCV  --  92 91 91   WBC  --  3.5* 5.1 7.7   PLT  --  174 182 227   INR  --   --   --  1.03      LIVER AND PANCREAS PANEL   Recent Labs   Lab 03/29/22  1616   AST 14   ALT <9   ALKPHOS 53   BILITOTAL 0.3   LIPASE 47     IMAGING STUDIES    EXAM: CT ABDOMEN PELVIS W CONTRAST  LOCATION: [facility]  DATE/TIME: 3/29/2022 6:29 PM     INDICATION: Nausea/vomiting.  COMPARISON: Chest CT 06/18/2019.  TECHNIQUE: CT scan of the abdomen and pelvis was performed following injection of " IV contrast. Multiplanar reformats were obtained. Dose reduction techniques were used.  CONTRAST: isovue 370 75ml     FINDINGS:   LOWER CHEST: Moderate hiatus hernia. Mild basilar bronchiectasis. Coronary calcifications.     HEPATOBILIARY: Few stable incidental small hepatic cysts requiring no routine follow-up. Borderline common bile duct enlargement at 8 mm. No obvious opaque gallstones.     PANCREAS: Irregular or clustered cystic change along the pancreatic head measuring roughly 12 x 18 mm (series 3, image 62).     SPLEEN: Stable incidental 10 mm splenic hypodensity.     ADRENAL GLANDS: Stable mild left adrenal thickening.     KIDNEYS/BLADDER: Incidental renal cortical and parapelvic cysts requiring no routine follow-up. No hydronephrosis. Slight urinary bladder wall hyperenhancement and surrounding stranding.     BOWEL: Extensive colonic diverticulosis pronounced in the sigmoid colon. Mild liquid colonic stool. Regions of mildly asymmetric small bowel wall hyperenhancement. Decompressed stomach.     LYMPH NODES: No adenopathy.     VASCULATURE: Tortuous and mildly atherosclerotic aorta without focal aneurysm.     PELVIC ORGANS: Hysterectomy.     MUSCULOSKELETAL: Bony demineralization and degenerative changes. Thoracolumbar scoliosis.                                                                      IMPRESSION:      1.  Few regions of asymmetric small bowel wall hyperenhancement, exaggerated by decompression. Additional mild liquid colonic stool. Findings may be sequela of an enterocolitis.     2.  Colonic diverticulosis without definitive diverticulitis.     3.  Moderate hiatus hernia.     4.  Slight urinary bladder wall hyperenhancement may reflect cystitis.     5.  Small amount of free pelvic fluid. No free air. No abscess.     6.  Cystic change in the pancreatic head up to roughly 18 mm, possibly IPMN formation. Recommend 12 month follow-up CT or MRI per guidelines below.     7.  Other findings in the  report.    I have reviewed the current diagnostic and laboratory tests.              IMPRESSION   Anemia-- multifactorial with low folate (3.4), low vitamin B12 (185), and low ferritin (20). No obvious evidence of gi bleeding at this time. EGD and colonoscopy were recommended for further evaluation of anemia, but the patient declines.   Nausea and vomiting at admission, resolved         PLAN   Advance diet as tolerated.     The patient does not want to proceed with egd and colonoscopy.   Let us know if the patient changes her mind. Agree with close monitoring of Hgb and RBC transfusions as needed. Agree with folate, vitamin B12, and iron replacement.  [facility] will sign off. Call with questions.         Sofia Mendiola PA-C  Thank you for the opportunity to participate in the care of this patient.   Please feel free to call me with any questions or concerns.  Phone number [phone].

## 2022-03-31 NOTE — PROGRESS NOTES
SENTHIL received a call from pt daughter Isabel, pt other daughter Flower was going to assist pt in going AMA home last night. Pt daughter Isabel states that pt does not do well at home alone, and will not have help once home. She reports pt has had many falls. SENTHIL informed Isabel that if pt goes home AMA and does not have a safe discharge plan, SENTHIL will need to call adult protection.   SENTHIL called Flower, she will stay with pt 24 hours a day until tues and then re- assess. She is bringing pt in to doctors appointments next weeks.     1215 Pt and daughter agreeable to home care for pt at discharge. Pt previously had home care with [facility] in [facility]. Referral sent for PT OT, RN, MARTA, JOSSELINE Bass    4/1/22  740 AM- [facility] accepted pt for home care. Orders sent.     JOSSELINE Mackay

## 2022-03-31 NOTE — PLAN OF CARE
Critical lab: Hemoglobin 6.9 @ 0625.   House officer called, redirected to hospitalist. Hospitalist paged.     Problem: Nausea and Vomiting  Goal: Fluid and Electrolyte Balance  Outcome: Ongoing, Progressing     Problem: Plan of Care - These are the overarching goals to be used throughout the patient stay.    Goal: Optimal Comfort and Wellbeing  Outcome: Ongoing, Progressing     Patient is refusing colonoscopy, EGD, and bowel prep. Notified GI surgery.  Pt A/O 4x. No pain reported overnight.   Pt was hoping to go home today. It sounds like it took a lot of convincing from evening nursing staff to convince her to not leave AMA last night.

## 2022-04-01 ENCOUNTER — PATIENT OUTREACH (OUTPATIENT)
Dept: CARE COORDINATION | Facility: CLINIC | Age: 87
End: 2022-04-01
Payer: COMMERCIAL

## 2022-04-01 DIAGNOSIS — Z71.89 OTHER SPECIFIED COUNSELING: ICD-10-CM

## 2022-04-01 LAB — BACTERIA UR CULT: ABNORMAL

## 2022-04-01 NOTE — PLAN OF CARE
Physical Therapy Discharge Summary    Reason for therapy discharge:    Discharged to home with home therapy. discontinue on 3/31/2022.    Progress towards therapy goal(s). See goals on Care Plan in [facility] electronic health record for goal details.  Goals partially met.  Barriers to achieving goals:   discharge on same date as initial evaluation.    Therapy recommendation(s):    Continued therapy is recommended.  Rationale/Recommendations:  to progress with mobility..

## 2022-04-01 NOTE — PROGRESS NOTES
"Clinic Care Coordination Contact  [facility]: Post-Discharge Note  SITUATION                                                      Admission:    Admission Date: 03/29/22   Reason for Admission: Gastroenteritis  Discharge:   Discharge Date: 03/31/22  Discharge Diagnosis: Gastroenteritis    BACKGROUND                                                      Per hospital discharge summary and inpatient provider notes:    87 year old female with PMH of hypertension, HPL, CKD-3, medication non-compliance, ischemic stoke (Jan 2022) who was brought to our ED for evaluation of nausea, vomiting and diarrhea     Patient reports feeling ill for the last 24 hours. She reports she went to a birthday party about 2 days ago. Yesterday, she didn't fell well, and started to have some nausea. She vomited multiple times, and also had diarrhea multiple times. She didn't look at the stool. It was watery, but unsure it was melanotic or not. Denies any hematemesis. Endorses having some abdominal discomfort. Denies urinary frequency or dysuria. Reports feeling feverish, but didn't take the temperature at home.     ASSESSMENT      Enrollment  Primary Care Care Coordination Status: Declined    Discharge Assessment  How are you doing now that you are home?: \"Doing much better\"  How are your symptoms? (Red Flag symptoms escalate to triage hotline per guidelines): Improved  Do you feel your condition is stable enough to be safe at home until your provider visit?: Yes  Does the patient have their discharge instructions? : Yes  Does the patient have questions regarding their discharge instructions? : No  Were you started on any new medications or were there changes to any of your previous medications? : Yes  Does the patient have all of their medications?: Yes  Do you have questions regarding any of your medications? : No  Do you have all of your needed medical supplies or equipment (DME)?  (i.e. oxygen tank, CPAP, cane, etc.): Yes  Discharge " follow-up appointment scheduled within 14 calendar days? : Yes  Discharge Follow Up Appointment Date: 04/04/22  Discharge Follow Up Appointment Scheduled with?: Primary Care Provider    Post-op (W CTA Only)  If the patient had a surgery or procedure, do they have any questions for a nurse?: No      PLAN                                                      Outpatient Plan:    Follow up with primary care provider, Edin Dailey, within  3-5days to evaluate medication change and for hospital follow- up.  The following labs/tests are recommended: Basic metabolic panel, CBC.    Future Appointments   Date Time Provider Department Center   4/4/2022  3:20 PM [facility]         For any urgent concerns, please contact our 24 hour nurse triage line: [phone])         HILLARY Jonas  [phone]  [facility] Care Resource [facility]

## 2022-04-01 NOTE — DISCHARGE SUMMARY
[facility]  Hospitalist Discharge Summary      Date of Admission:  3/29/2022  Date of Discharge:  3/31/2022  5:30 PM  Discharging Provider: TOMY DICKENS MD      Discharge Diagnoses   Active Problems:    Gastroenteritis    Malaise    Urinary tract infection without hematuria, site unspecified    Anemia, unspecified type       Discharge Procedure Orders   Home care nursing referral   Referral Priority: Routine: Next available opening Referral Type: Home Health Therapies & Aides   Number of Visits Requested: 1     Reason for your hospital stay   Order Comments: Gastroenteritis, anemia     Follow-up and recommended labs and tests   Order Comments: Follow up with primary care provider, Edin Dailey, within  3-5days to evaluate medication change and for hospital follow- up.  The following labs/tests are recommended: Basic metabolic panel, CBC.     Activity   Order Comments: Your activity upon discharge: activity as tolerated     Order Specific Question Answer Comments   Is discharge order? Yes      Diet   Order Comments: Follow this diet upon discharge: Orders Placed This Encounter      Regular Diet Adult       Order Specific Question Answer Comments   Is discharge order? Yes           Hospital Course     87 year old female with PMH of hypertension, HPL , ischemic stoke (Jan 2022) who was brought to our ED for evaluation of nausea, vomiting and diarrhea, found to have findings of enterocolitis, worsening anemia and UTI with hypertensive urgency.  Patient was seen by [facility].  Colonoscopy and EGD were recommended which the patient refused.  She was also found to have folate, vitamin B12 and iron deficiency as well as a pancreatic head cyst.     Nausea, vomiting and diarrhea: Likely gastroenteritis/enterocolitis  CT showed enterocolitis, also has UTI  Normal WBC, lactic acid  Symptoms resolved, no nausea or vomiting or diarrhea since admission.  Tolerating regular diet     Normocytic  anemia with folate, iron, B12 deficiency  Hgb of 8.8 noted. It was 13.7 in Jan 2022  Decreased to 6.9.  No overt melena or hematemesis.  Start PPI  Patient had iron deficiency, B12 deficiency, folate deficiency.  Peripheral smear was pending.  She was started on iron sucrose prior to discharge and discharged on oral iron, also oral for late.  She received a B12 injection and was discharged on daily oral B12.  She should slide with her primary physician if she wishes to continue oral supplementation or monthly IM shots.  Recommend recheck of labs in about a month.  Patient was recommended to have a EGD and colonoscopy, she refused.  Follow-up with CBC as outpatient.  Transfuse as needed.     Acute cystitis   CT showed findings consistent with cystitis  Started on Rocephin.  Asymptomatic.  Urine culture positive for E. coli, sensitivities pending at discharge.  Discharged home with Omnicef.     Hypertensive urgency:  /115.  On admission.  Patient says that she has been taking her medications  Much improved, continue lisinopril, metoprolol     Recent ischemic stroke:  Has residual right upper extremity weakness, stable.  Jan 2022  Continue statin, hold aspirin and plavix due to drop in hgb.  Restarted at discharge.    Pancreatic head [id number] with GI as outpatient     DNR/DNI per patient request    Consultations This Hospital Stay   GASTROENTEROLOGY IP CONSULT  CARE MANAGEMENT / SOCIAL WORK IP CONSULT  PHYSICAL THERAPY ADULT IP CONSULT  OCCUPATIONAL THERAPY ADULT IP CONSULT    Code Status   Prior         Greater than 35 minutes spent on coordinating discharge, evaluating labs, studies, evaluating patient, evaluating specialist notes    TOMY DICKENS MD  [address] [address]  Phone: [phone]  Fax: [phone]  ______________________________________________________________________         Primary Care Physician   Edin JAIMES  Asim    Discharge Orders      Home care nursing referral      Reason for your hospital stay    Gastroenteritis, anemia     Follow-up and recommended labs and tests    Follow up with primary care provider, Edin Dailey, within  3-5days to evaluate medication change and for hospital follow- up.  The following labs/tests are recommended: Basic metabolic panel, CBC.     Activity    Your activity upon discharge: activity as tolerated     Diet    Follow this diet upon discharge: Orders Placed This Encounter      Regular Diet Adult         Significant Results and Procedures   Most Recent 3 CBC's:Recent Labs   Lab Test 03/31/22  1516 03/31/22  0600 03/30/22  1626 03/30/22  0614 03/29/22  1616   WBC  --   --  3.5* 5.1 7.7   HGB 8.7* 6.9* 7.2* 7.3* 8.8*   MCV  --   --  92 91 91   PLT  --   --  174 182 227     Most Recent 3 BMP's:Recent Labs   Lab Test 03/30/22  0614 03/29/22  1616 01/14/22  0843    137 138   POTASSIUM 4.2 4.4 4.2   CHLORIDE 108* 107 105   CO2 21* 23 23   BUN 13 17 20   CR 0.88 0.93 0.96   ANIONGAP 8 7 10   LUBNA 7.4* 7.9* 9.3    120 82     Most Recent 6 Bacteria Isolates From Any Culture (See EPIC Reports for Culture Details):No lab results found.  Most Recent TSH and T4:Recent Labs   Lab Test 01/13/22  1149 07/13/21  1759   TSH 4.34 5.06*   T4  --  0.92     Most Recent Anemia Panel:Recent Labs   Lab Test 03/31/22  1516 03/31/22  0600 03/30/22  1626 03/30/22  0614   WBC  --   --  3.5* 5.1   HGB 8.7*   < > 7.2* 7.3*   HCT  --   --  24.0* 24.2*   MCV  --   --  92 91   PLT  --   --  174 182   IRON  --   --   --  22*   RETICABSCT  --   --  0.055  --    RETP  --   --  2.1  --    GEMMA  --   --  20  --    B12  --   --  185*  --    FOLIC  --   --  3.4*  --     < > = values in this interval not displayed.   ,   Results for orders placed or performed during the hospital encounter of 03/29/22   CT Abdomen Pelvis w Contrast    Narrative    EXAM: CT ABDOMEN PELVIS W CONTRAST  LOCATION: [facility]  DATE/TIME: 3/29/2022 6:29 PM    INDICATION: Nausea/vomiting.  COMPARISON: Chest CT 06/18/2019.  TECHNIQUE: CT scan of the abdomen and pelvis was performed following injection of IV contrast. Multiplanar reformats were obtained. Dose reduction techniques were used.  CONTRAST: isovue 370 75ml    FINDINGS:   LOWER CHEST: Moderate hiatus hernia. Mild basilar bronchiectasis. Coronary calcifications.    HEPATOBILIARY: Few stable incidental small hepatic cysts requiring no routine follow-up. Borderline common bile duct enlargement at 8 mm. No obvious opaque gallstones.    PANCREAS: Irregular or clustered cystic change along the pancreatic head measuring roughly 12 x 18 mm (series 3, image 62).    SPLEEN: Stable incidental 10 mm splenic hypodensity.    ADRENAL GLANDS: Stable mild left adrenal thickening.    KIDNEYS/BLADDER: Incidental renal cortical and parapelvic cysts requiring no routine follow-up. No hydronephrosis. Slight urinary bladder wall hyperenhancement and surrounding stranding.    BOWEL: Extensive colonic diverticulosis pronounced in the sigmoid colon. Mild liquid colonic stool. Regions of mildly asymmetric small bowel wall hyperenhancement. Decompressed stomach.    LYMPH NODES: No adenopathy.    VASCULATURE: Tortuous and mildly atherosclerotic aorta without focal aneurysm.    PELVIC ORGANS: Hysterectomy.    MUSCULOSKELETAL: Bony demineralization and degenerative changes. Thoracolumbar scoliosis.      Impression    IMPRESSION:     1.  Few regions of asymmetric small bowel wall hyperenhancement, exaggerated by decompression. Additional mild liquid colonic stool. Findings may be sequela of an enterocolitis.    2.  Colonic diverticulosis without definitive diverticulitis.    3.  Moderate hiatus hernia.    4.  Slight urinary bladder wall hyperenhancement may reflect cystitis.    5.  Small amount of free pelvic fluid. No free air. No abscess.    6.  Cystic change in the pancreatic head up  to roughly 18 mm, possibly IPMN formation. Recommend 12 month follow-up CT or MRI per guidelines below.    7.  Other findings in the report.    REFERENCE:  International Consensus Guidelines for Management of IPMN and MCN of the Pancreas. Pancreatology 12 (2012) 183-197.    Asymptomatic patient without high-risk stigmata of malignancy (obstructive jaundice with cystic lesion in head of pancreas, enhancing solid component within cyst, or main pancreatic duct greater than 10mm), and without worrisome features (cyst greater   than 3 cm, thickened/enhancing cyst walls, main pancreatic duct 5-9 mm, mural nodule, or abrupt change in caliber of pancreatic duct with distal pancreatic atrophy).    Size of largest cyst:  Less than 1 cm: CT or MRI with contrast in 2-3 years.    1-2 cm: CT or MRI with contrast yearly for 2 years, then lengthen interval if no change.    2-3 cm: EUS in 3-6 months, then can alternate MRI with EUS as appropriate.    Greater than 3 cm: Close surveillance alternating MRI with EUS every 3-6 months. Consider surgery in young, fit patients.    Consider Gastroenterology consultation for all categories of pancreatic cysts.       Discharge Medications   Discharge Medication List as of 3/31/2022  5:02 PM      START taking these medications    Details   cefdinir (OMNICEF) 300 MG capsule Take 1 capsule (300 mg) by mouth every 12 hours for 4 days, Disp-8 capsule, R-0, E-Prescribe      cyanocobalamin (VITAMIN B-12) 1000 MCG SUBL sublingual tablet Place 1 tablet (1,000 mcg) under the tongue daily, Disp-30 tablet, R-0, E-Prescribe      folic acid (FOLVITE) 1 MG tablet Take 1 tablet (1 mg) by mouth daily, Disp-30 tablet, R-0, E-Prescribe      pantoprazole (PROTONIX) 40 MG EC tablet Take 1 tablet (40 mg) by mouth every morning (before breakfast), Disp-30 tablet, R-0, E-Prescribe         CONTINUE these medications which have NOT CHANGED    Details   aspirin (ASA) 81 MG EC tablet Take 1 tablet (81 mg) by mouth daily,  "Disp-30 tablet, R-0, E-Prescribe      atorvastatin (LIPITOR) 40 MG tablet Take 1 tablet (40 mg) by mouth every evening, Disp-90 tablet, R-1, E-Prescribe      clopidogrel (PLAVIX) 75 MG tablet Take 1 tablet (75 mg) by mouth daily, Disp-90 tablet, R-1, E-Prescribe      lisinopril (ZESTRIL) 10 MG tablet Take 1 tablet (10 mg) by mouth daily, Disp-30 tablet, R-0, E-Prescribe      metoprolol tartrate (LOPRESSOR) 25 MG tablet Take 1 tablet (25 mg) by mouth 2 times daily, Disp-180 tablet, R-1, E-Prescribe      sertraline (ZOLOFT) 100 MG tablet Take 1 tablet (100 mg) by mouth daily, Disp-30 tablet, R-0, E-Prescribe           Allergies   Allergies   Allergen Reactions     Seafood Unknown       Physical Exam:  Temp:  [97.8  F (36.6  C)-98.4  F (36.9  C)] 97.8  F (36.6  C)  Pulse:  [58-68] 68  Resp:  [18] 18  BP: (135-184)/(63-81) 150/71  SpO2:  [92 %-95 %] 94 %    BP (!) 150/71 (BP Location: Left arm, Cuff Size: Adult Regular)   Pulse 68   Temp 97.8  F (36.6  C) (Oral)   Resp 18   Ht 1.625 m (5' 3.98\")   Wt 68 kg (149 lb 14.6 oz)   SpO2 94%   BMI 25.75 kg/m    General appearance: alert, appears stated age and cooperative  Head: Normocephalic, without obvious abnormality, atraumatic  Eyes: Clear conjuctiva  Neck: no JVD and supple, symmetrical, trachea midline  Lungs: clear to auscultation bilaterally  Heart: regular rate and rhythm, S1, S2 normal, no murmur, click, rub or gallop  Abdomen: soft, non-tender; bowel sounds normal; no masses,  no organomegaly  Extremities: Cally's sign is negative, no sign of DVT  Skin: Skin color, texture, turgor normal. No rashes or lesions  Neurologic: Mental status: Alert, oriented, thought content appropriate  Cranial nerves: normal  Sensory: normal  Motor:Right  strength 3 out of 5        "

## 2022-04-04 ENCOUNTER — OFFICE VISIT (OUTPATIENT)
Dept: FAMILY MEDICINE | Facility: CLINIC | Age: 87
End: 2022-04-04
Payer: COMMERCIAL

## 2022-04-04 VITALS
DIASTOLIC BLOOD PRESSURE: 65 MMHG | WEIGHT: 147 LBS | HEART RATE: 65 BPM | RESPIRATION RATE: 16 BRPM | SYSTOLIC BLOOD PRESSURE: 151 MMHG | BODY MASS INDEX: 25.25 KG/M2

## 2022-04-04 DIAGNOSIS — I10 ESSENTIAL HYPERTENSION: ICD-10-CM

## 2022-04-04 DIAGNOSIS — D50.9 IRON DEFICIENCY ANEMIA, UNSPECIFIED IRON DEFICIENCY ANEMIA TYPE: ICD-10-CM

## 2022-04-04 DIAGNOSIS — K86.2 PANCREATIC CYST: ICD-10-CM

## 2022-04-04 DIAGNOSIS — K52.9 GASTROENTERITIS: ICD-10-CM

## 2022-04-04 DIAGNOSIS — E53.8 FOLATE DEFICIENCY: ICD-10-CM

## 2022-04-04 DIAGNOSIS — A08.4 VIRAL GASTROENTERITIS: Primary | ICD-10-CM

## 2022-04-04 DIAGNOSIS — I63.9 ISCHEMIC STROKE (H): ICD-10-CM

## 2022-04-04 DIAGNOSIS — E53.8 VITAMIN B12 DEFICIENCY (NON ANEMIC): ICD-10-CM

## 2022-04-04 LAB
ANION GAP SERPL CALCULATED.3IONS-SCNC: 10 MMOL/L (ref 5–18)
BUN SERPL-MCNC: 17 MG/DL (ref 8–28)
CALCIUM SERPL-MCNC: 8.5 MG/DL (ref 8.5–10.5)
CHLORIDE BLD-SCNC: 106 MMOL/L (ref 98–107)
CO2 SERPL-SCNC: 24 MMOL/L (ref 22–31)
CREAT SERPL-MCNC: 1.12 MG/DL (ref 0.6–1.1)
ERYTHROCYTE [DISTWIDTH] IN BLOOD BY AUTOMATED COUNT: 15.9 % (ref 10–15)
GFR SERPL CREATININE-BSD FRML MDRD: 47 ML/MIN/1.73M2
GLUCOSE BLD-MCNC: 103 MG/DL (ref 70–125)
HCT VFR BLD AUTO: 30.2 % (ref 35–47)
HGB BLD-MCNC: 9.4 G/DL (ref 11.7–15.7)
MCH RBC QN AUTO: 28 PG (ref 26.5–33)
MCHC RBC AUTO-ENTMCNC: 31.1 G/DL (ref 31.5–36.5)
MCV RBC AUTO: 90 FL (ref 78–100)
PLATELET # BLD AUTO: 222 10E3/UL (ref 150–450)
POTASSIUM BLD-SCNC: 3.9 MMOL/L (ref 3.5–5)
RBC # BLD AUTO: 3.36 10E6/UL (ref 3.8–5.2)
SODIUM SERPL-SCNC: 140 MMOL/L (ref 136–145)
WBC # BLD AUTO: 5.6 10E3/UL (ref 4–11)

## 2022-04-04 PROCEDURE — 85027 COMPLETE CBC AUTOMATED: CPT | Performed by: FAMILY MEDICINE

## 2022-04-04 PROCEDURE — 36415 COLL VENOUS BLD VENIPUNCTURE: CPT | Performed by: FAMILY MEDICINE

## 2022-04-04 PROCEDURE — 99495 TRANSJ CARE MGMT MOD F2F 14D: CPT | Performed by: FAMILY MEDICINE

## 2022-04-04 PROCEDURE — 80048 BASIC METABOLIC PNL TOTAL CA: CPT | Performed by: FAMILY MEDICINE

## 2022-04-04 NOTE — PATIENT INSTRUCTIONS
Remedios,    We will check your blood counts profile today  Continue pantoprazole/Protonix which is an acid blocking medication  If that is not covered we can consider omeprazole/Prilosec as an alternative  Continue oral B12 supplementation  Continue folic acid supplementation  I do recommend iron supplementation.  Ferrous sulfate 325 mg daily  You can check with the pharmacist for the recommendations regarding an over-the-counter product  Follow-up in 2 weeks for lab check  Please let me know if there are any concerns going forward    Edin Dailey MD

## 2022-04-04 NOTE — PROGRESS NOTES
Assessment & Plan     Viral gastroenteritis  Gastroenteritis    Symptoms have resolved    Reviewed the CT scan of the chest as well as laboratory testing  Continue supportive measures and follow-up as needed  - Basic metabolic panel      Iron deficiency anemia, unspecified iron deficiency anemia type    As noted, her hemoglobin dropped from a baseline of 13.7-8.8  An upper endoscopy and colonoscopy have been recommended and discussed the patient declines those  She received a transfusion  She will continue iron supplementation  Recheck a hemogram with platelets in 2 weeks to make sure her blood counts are stable  - CBC with platelets    Essential hypertension    Her blood pressure is elevated today  Continue lisinopril and metoprolol    Vitamin B12 deficiency (non anemic)    She will continue with oral B12 supplementation currently  However, may consider B12 injections    Ischemic stroke (H)    Reviewed her recent history and hospitalization  She is treated with aspirin and Plavix  Discussed concerns including the possibility of a gastrointestinal bleed with recent low hemoglobin  She will continue aspirin and Plavix for stroke prophylaxis though may need to revisit this if her hemoglobin continues to drop next her hemoglobin will be rechecked in 2 weeks    Folate deficiency  Continue with folic acid supplementation    Reviewed the extensive hospitalization and notes including laboratory testing, consultation notes, and imaging results including the CT scan of the chest  Reviewed the plan in detail for this complex patient    Pancreatic cyst    Reviewed her CT scan of the abdomen showing pancreatic cyst with follow-up recommended with gastroenterology  Patient does not wish to have any invasive testing at this time    Health maintenance     Her daughter Flower is present for the visit today and continues to provide support for her mother who lives in dependently  6}     BMI:   Estimated body mass index is 25.25  "kg/m  as calculated from the following:    Height as of 3/29/22: 1.625 m (5' 3.98\").    Weight as of this encounter: 66.7 kg (147 lb).   Weight management plan: Discussed healthy diet and exercise guidelines    See Patient Instructions    No follow-ups on file.    Edin Dailey MD  [facility]SC[facility]LE[facility] Whittaker is a 87 year old who presents to the clinic with her daughter Flower following a recent hospitalization from March 29 through March 31 where she was assessed to have gastroenteritis and malaise.  She also had a urinary tract infection and was noted to be anemic.  She had presented with symptoms of nausea, vomiting, and diarrhea.  She previously had a family exposure with multiple family members having similar symptoms.  She had an extensive evaluation and was assessed to have a probable gastroenteritis.  She did have a CT scan of the chest which showed findings consistent with a gastroenteritis.  She was treated conservatively with IV fluids.  She was noted to be quite anemic with a hemoglobin that had dropped to 8.8 from a previous value of 13.7.  She therefore required a transfusion.  Other findings included a urinary tract infection treated with Rocephin.  This infection was due to E. coli.  She was discharged on Omnicef.    It should be noted that she had evaluation by gastroenterology and an upper endoscopy and colonoscopy were recommended given iron deficiency anemia.  She declined this and continues to not want to have this testing done.  She has started iron supplementation.  She also was noted to have a low B12 level and low folate level.  She has been treated with Protonix.    She follows up today stating that she is feeling better.  She is no longer experiencing nausea, vomiting, or diarrhea.  Her urinary tract infection has been treated.  She received a transfusion and her energy level has improved.  She is taking iron supplementation.  She also will take " "pantoprazole given the possibility of a gastrointestinal bleed.  Laboratory testing will be monitored.    She noted that she continues to take Plavix and aspirin as started at a previous visit and this may need to be revisited.  Here is her recent note:    Following her previous hospitalization we reviewed how she began to experience a constellation of symptoms including slurred speech and was noted to have a right facial droop. She states that she felt like her tongue was fat and she experienced drooling. Unfortunately, she had the symptoms over the course of a day. The following day she was encouraged to call 911 and was taken to the hospital by paramedics where an MRI was abnormal and revealed subacute ischemia involving the posterior putamen. Neurology was consulted and he was admitted for observation. She was started on aspirin as well as Plavix. For blood pressure she has been treated with metoprolol and lisinopril. Atorvastatin was also started. She does have history of hypertension but unfortunately was not taking her blood pressure medication as recommended. She follows up today stating that she is feeling better. She still has some mild facial weakness. She denies significant weakness in her extremities. She denies chest pain or shortness of breath. She will follow up physical therapy.        She continues to live alone and has good family support. She would like to continue to live independently and her daughter will continue to check on her.          HPI     Post Discharge Outreach 4/1/2022   Admission Date 3/29/2022   Reason for Admission Gastroenteritis   Discharge Date 3/31/2022   Discharge Diagnosis Gastroenteritis   How are you doing now that you are home? \"Doing much better\"   How are your symptoms? (Red Flag symptoms escalate to triage hotline per guidelines) Improved   Do you feel your condition is stable enough to be safe at home until your provider visit? Yes   Does the patient have their " discharge instructions?  Yes   Does the patient have questions regarding their discharge instructions?  No   Were you started on any new medications or were there changes to any of your previous medications?  Yes   Does the patient have all of their medications? Yes   Do you have questions regarding any of your medications?  No   Do you have all of your needed medical supplies or equipment (DME)?  (i.e. oxygen tank, CPAP, cane, etc.) Yes   Discharge follow-up appointment scheduled within 14 calendar days?  Yes   Discharge Follow Up Appointment Date 4/4/2022   Discharge Follow Up Appointment Scheduled with? Primary Care Provider     Hospital Follow-up Visit:    Hospital/Nursing Home/IP Rehab Facility: [facility]  Date of Admission: March 29, 2022  Date of Discharge: March 31, 2022  Reason(s) for Admission: Nausea, vomiting, diarrhea      Was your hospitalization related to COVID-19? No   Problems taking medications regularly:  None  Medication changes since discharge: Folate, B12, Protonix  Problems adhering to non-medication therapy:  None    Summary of hospitalization:  [facility] discharge summary reviewed  Diagnostic Tests/Treatments reviewed.  Follow up needed: CT scan of Abdomen  Other Healthcare Providers Involved in Patient s Care:         None  Update since discharge: improved. Post Discharge Medication Reconciliation: discharge medications reconciled, continue medications without change.  Plan of care communicated with patient            Review of Systems   Constitutional, HEENT, cardiovascular, pulmonary, GI, , musculoskeletal, neuro, skin, endocrine and psych systems are negative, except as otherwise noted.      Objective    BP (!) 151/65 (BP Location: Left arm, Patient Position: Sitting, Cuff Size: Adult Regular)   Pulse 65   Resp 16   Wt 66.7 kg (147 lb)   BMI 25.25 kg/m    Body mass index is 25.25 kg/m .  Physical Exam   GENERAL: healthy, alert and no  distress  EYES: Eyes grossly normal to inspection, PERRL and conjunctivae and sclerae normal  HENT: ear canals and TM's normal, nose and mouth without ulcers or lesions  NECK: no adenopathy, no asymmetry, masses, or scars and thyroid normal to palpation  RESP: lungs clear to auscultation - no rales, rhonchi or wheezes  BREAST: normal without masses, tenderness or nipple discharge and no palpable axillary masses or adenopathy  CV: regular rate and rhythm, normal S1 S2, no S3 or S4, no murmur, click or rub, no peripheral edema and peripheral pulses strong  ABDOMEN: soft, nontender, without hepatosplenomegaly or masses  MS: no gross musculoskeletal defects noted, no edema  SKIN: no suspicious lesions or rashes  NEURO: Normal strength and tone, mentation intact and speech normal  PSYCH: mentation appears normal, affect normal/bright      Radiology:                                                                     IMPRESSION:      1.  Few regions of asymmetric small bowel wall hyperenhancement, exaggerated by decompression. Additional mild liquid colonic stool. Findings may be sequela of an enterocolitis.     2.  Colonic diverticulosis without definitive diverticulitis.     3.  Moderate hiatus hernia.     4.  Slight urinary bladder wall hyperenhancement may reflect cystitis.     5.  Small amount of free pelvic fluid. No free air. No abscess.     6.  Cystic change in the pancreatic head up to roughly 18 mm, possibly IPMN formation. Recommend 12 month follow-up CT or MRI per guidelines below.     7.  Other findings in the report.     REFERENCE:  International Consensus Guidelines for Management of IPMN and MCN of the Pancreas. Pancreatology 12 (2012) 183-197.     Asymptomatic patient without high-risk stigmata of malignancy (obstructive jaundice with cystic lesion in head of pancreas, enhancing solid component within cyst, or main pancreatic duct greater than 10mm), and without worrisome features (cyst greater   than 3  cm, thickened/enhancing cyst walls, main pancreatic duct 5-9 mm, mural nodule, or abrupt change in caliber of pancreatic duct with distal pancreatic atrophy).     Size of largest cyst:  Less than 1 cm: CT or MRI with contrast in 2-3 years.     1-2 cm: CT or MRI with contrast yearly for 2 years, then lengthen interval if no change.     2-3 cm: EUS in 3-6 months, then can alternate MRI with EUS as appropriate.     Greater than 3 cm: Close surveillance alternating MRI with EUS every 3-6 months. Consider surgery in young, fit patients.     Consider Gastroenterology consultation for all categories of pancreatic cysts.

## 2022-04-13 ENCOUNTER — DOCUMENTATION ONLY (OUTPATIENT)
Dept: OTHER | Facility: CLINIC | Age: 87
End: 2022-04-13
Payer: COMMERCIAL

## 2022-04-21 ENCOUNTER — LAB (OUTPATIENT)
Dept: LAB | Facility: CLINIC | Age: 87
End: 2022-04-21
Payer: COMMERCIAL

## 2022-04-21 DIAGNOSIS — D50.9 IRON DEFICIENCY ANEMIA, UNSPECIFIED IRON DEFICIENCY ANEMIA TYPE: ICD-10-CM

## 2022-04-21 DIAGNOSIS — E53.8 VITAMIN B12 DEFICIENCY (NON ANEMIC): ICD-10-CM

## 2022-04-21 DIAGNOSIS — I10 ESSENTIAL HYPERTENSION: ICD-10-CM

## 2022-04-21 LAB
ANION GAP SERPL CALCULATED.3IONS-SCNC: 12 MMOL/L (ref 5–18)
BUN SERPL-MCNC: 22 MG/DL (ref 8–28)
CALCIUM SERPL-MCNC: 9.1 MG/DL (ref 8.5–10.5)
CHLORIDE BLD-SCNC: 105 MMOL/L (ref 98–107)
CO2 SERPL-SCNC: 23 MMOL/L (ref 22–31)
CREAT SERPL-MCNC: 1.14 MG/DL (ref 0.6–1.1)
ERYTHROCYTE [DISTWIDTH] IN BLOOD BY AUTOMATED COUNT: 16.8 % (ref 10–15)
FERRITIN SERPL-MCNC: 46 NG/ML (ref 10–130)
GFR SERPL CREATININE-BSD FRML MDRD: 46 ML/MIN/1.73M2
GLUCOSE BLD-MCNC: 87 MG/DL (ref 70–125)
HCT VFR BLD AUTO: 30.9 % (ref 35–47)
HGB BLD-MCNC: 9.3 G/DL (ref 11.7–15.7)
MCH RBC QN AUTO: 27.4 PG (ref 26.5–33)
MCHC RBC AUTO-ENTMCNC: 30.1 G/DL (ref 31.5–36.5)
MCV RBC AUTO: 91 FL (ref 78–100)
PLATELET # BLD AUTO: 243 10E3/UL (ref 150–450)
POTASSIUM BLD-SCNC: 4.8 MMOL/L (ref 3.5–5)
RBC # BLD AUTO: 3.39 10E6/UL (ref 3.8–5.2)
SODIUM SERPL-SCNC: 140 MMOL/L (ref 136–145)
VIT B12 SERPL-MCNC: 1115 PG/ML (ref 213–816)
WBC # BLD AUTO: 4.5 10E3/UL (ref 4–11)

## 2022-04-21 PROCEDURE — 82728 ASSAY OF FERRITIN: CPT

## 2022-04-21 PROCEDURE — 82607 VITAMIN B-12: CPT

## 2022-04-21 PROCEDURE — 80048 BASIC METABOLIC PNL TOTAL CA: CPT

## 2022-04-21 PROCEDURE — 36415 COLL VENOUS BLD VENIPUNCTURE: CPT

## 2022-04-21 PROCEDURE — 85027 COMPLETE CBC AUTOMATED: CPT

## 2022-05-08 DIAGNOSIS — F41.9 ANXIETY DISORDER, UNSPECIFIED: ICD-10-CM

## 2022-05-09 RX ORDER — TRAZODONE HYDROCHLORIDE 50 MG/1
TABLET, FILM COATED ORAL
Qty: 180 TABLET | Refills: 2 | Status: SHIPPED | OUTPATIENT
Start: 2022-05-09 | End: 2022-08-04

## 2022-05-13 ENCOUNTER — TELEPHONE (OUTPATIENT)
Dept: FAMILY MEDICINE | Facility: CLINIC | Age: 87
End: 2022-05-13
Payer: COMMERCIAL

## 2022-05-13 NOTE — TELEPHONE ENCOUNTER
Incoming call from patient's daughter, Hannah wanting to talk to Dr. Dailey. Per Hannah, pt had an episode this morning and they had to call the paramedics to come take pt to the hospital. Hannah stated that patient mixed up her medications - pt took her night medications instead of her day meds. She is requesting provider to call her back to discuss further.

## 2022-05-17 NOTE — TELEPHONE ENCOUNTER
Called and reviewed.  Remedios had become confused and took an extra dose of trazodone and was groggy.  She was evaluated by paramedics.  We discussed options and will have her discontinue trazodone and try melatonin for sleep.  Recommend trying an update if there are concerns.

## 2022-07-16 DIAGNOSIS — I10 ESSENTIAL HYPERTENSION: ICD-10-CM

## 2022-07-16 NOTE — TELEPHONE ENCOUNTER
"Routing refill request to provider for review/approval because:  Labs out of range:  Cr bp    Last Written Prescription Date:  1/15/22  Last Fill Quantity: 30,  # refills: 0   Last office visit provider:  4/4/22     Requested Prescriptions   Pending Prescriptions Disp Refills     lisinopril (ZESTRIL) 10 MG tablet [Pharmacy Med Name: LISINOPRIL 10 MG TABLET] 90 tablet 1     Sig: TAKE 1 TABLET BY MOUTH EVERY DAY       ACE Inhibitors (Including Combos) Protocol Failed - 7/16/2022 10:34 AM        Failed - Blood pressure under 140/90 in past 12 months     BP Readings from Last 3 Encounters:   04/04/22 (!) 151/65   03/31/22 (!) 150/71   01/24/22 (!) 151/89                 Failed - Normal serum creatinine on file in past 12 months     Recent Labs   Lab Test 04/21/22  1326   CR 1.14*       Ok to refill medication if creatinine is low          Passed - Recent (12 mo) or future (30 days) visit within the authorizing provider's specialty     Patient has had an office visit with the authorizing provider or a provider within the authorizing providers department within the previous 12 mos or has a future within next 30 days. See \"Patient Info\" tab in inbasket, or \"Choose Columns\" in Meds & Orders section of the refill encounter.              Passed - Medication is active on med list        Passed - Patient is age 18 or older        Passed - No active pregnancy on record        Passed - Normal serum potassium on file in past 12 months     Recent Labs   Lab Test 04/21/22  1326   POTASSIUM 4.8             Passed - No positive pregnancy test within past 12 months             Kita Mitchell, RN 07/16/22 5:25 PM  "

## 2022-07-17 RX ORDER — LISINOPRIL 10 MG/1
TABLET ORAL
Qty: 90 TABLET | Refills: 1 | Status: SHIPPED | OUTPATIENT
Start: 2022-07-17 | End: 2023-02-07

## 2022-07-29 ENCOUNTER — TELEPHONE (OUTPATIENT)
Dept: FAMILY MEDICINE | Facility: CLINIC | Age: 87
End: 2022-07-29

## 2022-07-29 DIAGNOSIS — R53.82 CHRONIC FATIGUE: ICD-10-CM

## 2022-07-29 DIAGNOSIS — D50.9 IRON DEFICIENCY ANEMIA, UNSPECIFIED IRON DEFICIENCY ANEMIA TYPE: Primary | ICD-10-CM

## 2022-07-29 NOTE — TELEPHONE ENCOUNTER
Patient's dtr Hannah calling to request for Hemoglobin blood draw for patient.    Reports that dtr had discussion with provider during previous appt & provider indicated that if any new changes in patient's condition to call & request for a lab draw. Dtr states that patient has been very fatigued lately and been sleeping a lot. Mentioned that patient has appt with provider on 8/8 & would like to have lab drawn before appt so can discuss results with provider at appt. Will route to provider to advise.    Dr. Dailey would you be okay to put in lab orders for patient?    Please call dtr Hannah at [phone] once lab orders are placed.    Franklyn Pereyra, RN, BSN  [facility]

## 2022-08-04 ENCOUNTER — TELEPHONE (OUTPATIENT)
Dept: FAMILY MEDICINE | Facility: CLINIC | Age: 87
End: 2022-08-04

## 2022-08-04 ENCOUNTER — HOSPITAL ENCOUNTER (INPATIENT)
Facility: CLINIC | Age: 87
LOS: 4 days | Discharge: HOME | DRG: 378 | End: 2022-08-08
Attending: EMERGENCY MEDICINE | Admitting: INTERNAL MEDICINE
Payer: COMMERCIAL

## 2022-08-04 ENCOUNTER — LAB (OUTPATIENT)
Dept: LAB | Facility: CLINIC | Age: 87
End: 2022-08-04
Payer: COMMERCIAL

## 2022-08-04 DIAGNOSIS — K92.2 GASTROINTESTINAL HEMORRHAGE, UNSPECIFIED GASTROINTESTINAL HEMORRHAGE TYPE: ICD-10-CM

## 2022-08-04 DIAGNOSIS — D50.0 IRON DEFICIENCY ANEMIA DUE TO CHRONIC BLOOD LOSS: Primary | ICD-10-CM

## 2022-08-04 DIAGNOSIS — K92.2 ACUTE GASTROINTESTINAL HEMORRHAGE: ICD-10-CM

## 2022-08-04 DIAGNOSIS — R53.82 CHRONIC FATIGUE: ICD-10-CM

## 2022-08-04 DIAGNOSIS — D50.9 IRON DEFICIENCY ANEMIA, UNSPECIFIED IRON DEFICIENCY ANEMIA TYPE: ICD-10-CM

## 2022-08-04 DIAGNOSIS — Z11.52 ENCOUNTER FOR SCREENING LABORATORY TESTING FOR SEVERE ACUTE RESPIRATORY SYNDROME CORONAVIRUS 2 (SARS-COV-2): ICD-10-CM

## 2022-08-04 DIAGNOSIS — D64.9 ANEMIA, UNSPECIFIED TYPE: ICD-10-CM

## 2022-08-04 PROBLEM — N39.0 URINARY TRACT INFECTION WITHOUT HEMATURIA, SITE UNSPECIFIED: Status: RESOLVED | Noted: 2022-03-29 | Resolved: 2022-08-04

## 2022-08-04 LAB
ABO/RH(D): NORMAL
ALBUMIN SERPL-MCNC: 3.2 G/DL (ref 3.4–5)
ALP SERPL-CCNC: 69 U/L (ref 40–150)
ALT SERPL W P-5'-P-CCNC: 15 U/L (ref 0–50)
ANION GAP SERPL CALCULATED.3IONS-SCNC: 7 MMOL/L (ref 3–14)
ANION GAP SERPL CALCULATED.3IONS-SCNC: 7 MMOL/L (ref 7–15)
ANTIBODY SCREEN: NEGATIVE
APTT PPP: 25 SECONDS (ref 22–38)
AST SERPL W P-5'-P-CCNC: 14 U/L (ref 0–45)
BASOPHILS # BLD AUTO: 0.1 10E3/UL (ref 0–0.2)
BASOPHILS NFR BLD AUTO: 1 %
BILIRUB SERPL-MCNC: 0.1 MG/DL (ref 0.2–1.3)
BLD PROD TYP BPU: NORMAL
BLD PROD TYP BPU: NORMAL
BLOOD COMPONENT TYPE: NORMAL
BLOOD COMPONENT TYPE: NORMAL
BUN SERPL-MCNC: 25 MG/DL (ref 7–30)
BUN SERPL-MCNC: 25.5 MG/DL (ref 8–23)
CALCIUM SERPL-MCNC: 8.2 MG/DL (ref 8.5–10.1)
CALCIUM SERPL-MCNC: 8.6 MG/DL (ref 8.8–10.2)
CHLORIDE BLD-SCNC: 108 MMOL/L (ref 94–109)
CHLORIDE SERPL-SCNC: 101 MMOL/L (ref 98–107)
CO2 SERPL-SCNC: 25 MMOL/L (ref 20–32)
CODING SYSTEM: NORMAL
CODING SYSTEM: NORMAL
CREAT SERPL-MCNC: 1.05 MG/DL (ref 0.52–1.04)
CREAT SERPL-MCNC: 1.2 MG/DL (ref 0.51–0.95)
CROSSMATCH: NORMAL
CROSSMATCH: NORMAL
DEPRECATED HCO3 PLAS-SCNC: 24 MMOL/L (ref 22–29)
EOSINOPHIL # BLD AUTO: 0.2 10E3/UL (ref 0–0.7)
EOSINOPHIL NFR BLD AUTO: 5 %
ERYTHROCYTE [DISTWIDTH] IN BLOOD BY AUTOMATED COUNT: 17.4 % (ref 10–15)
FERRITIN SERPL-MCNC: 10 NG/ML (ref 11–328)
GFR SERPL CREATININE-BSD FRML MDRD: 43 ML/MIN/1.73M2
GFR SERPL CREATININE-BSD FRML MDRD: 51 ML/MIN/1.73M2
GLUCOSE BLD-MCNC: 103 MG/DL (ref 70–99)
GLUCOSE SERPL-MCNC: 101 MG/DL (ref 70–99)
HCT VFR BLD AUTO: 18.6 % (ref 35–47)
HEMOCCULT STL QL: POSITIVE
HGB BLD-MCNC: 5.2 G/DL (ref 11.7–15.7)
HOLD SPECIMEN: NORMAL
HOLD SPECIMEN: NORMAL
IMM GRANULOCYTES # BLD: 0 10E3/UL
IMM GRANULOCYTES NFR BLD: 0 %
INR PPP: 1.02 (ref 0.85–1.15)
ISSUE DATE AND TIME: NORMAL
ISSUE DATE AND TIME: NORMAL
LYMPHOCYTES # BLD AUTO: 1.1 10E3/UL (ref 0.8–5.3)
LYMPHOCYTES NFR BLD AUTO: 21 %
MCH RBC QN AUTO: 21.2 PG (ref 26.5–33)
MCHC RBC AUTO-ENTMCNC: 28 G/DL (ref 31.5–36.5)
MCV RBC AUTO: 76 FL (ref 78–100)
MONOCYTES # BLD AUTO: 0.4 10E3/UL (ref 0–1.3)
MONOCYTES NFR BLD AUTO: 9 %
NEUTROPHILS # BLD AUTO: 3.1 10E3/UL (ref 1.6–8.3)
NEUTROPHILS NFR BLD AUTO: 64 %
PLATELET # BLD AUTO: 299 10E3/UL (ref 150–450)
POTASSIUM BLD-SCNC: 4.8 MMOL/L (ref 3.4–5.3)
POTASSIUM SERPL-SCNC: 4.7 MMOL/L (ref 3.4–5.3)
PROT SERPL-MCNC: 6.7 G/DL (ref 6.8–8.8)
RBC # BLD AUTO: 2.45 10E6/UL (ref 3.8–5.2)
SARS-COV-2 RNA RESP QL NAA+PROBE: NEGATIVE
SODIUM SERPL-SCNC: 132 MMOL/L (ref 136–145)
SODIUM SERPL-SCNC: 140 MMOL/L (ref 133–144)
SPECIMEN EXPIRATION DATE: NORMAL
TSH SERPL DL<=0.005 MIU/L-ACNC: 5.09 UIU/ML (ref 0.3–4.2)
UNIT ABO/RH: NORMAL
UNIT ABO/RH: NORMAL
UNIT NUMBER: NORMAL
UNIT NUMBER: NORMAL
UNIT STATUS: NORMAL
UNIT STATUS: NORMAL
UNIT TYPE ISBT: 5100
UNIT TYPE ISBT: 5100
WBC # BLD AUTO: 4.9 10E3/UL (ref 4–11)

## 2022-08-04 PROCEDURE — 86850 RBC ANTIBODY SCREEN: CPT | Performed by: EMERGENCY MEDICINE

## 2022-08-04 PROCEDURE — 82728 ASSAY OF FERRITIN: CPT

## 2022-08-04 PROCEDURE — 250N000011 HC RX IP 250 OP 636: Performed by: EMERGENCY MEDICINE

## 2022-08-04 PROCEDURE — 82272 OCCULT BLD FECES 1-3 TESTS: CPT | Performed by: EMERGENCY MEDICINE

## 2022-08-04 PROCEDURE — 250N000013 HC RX MED GY IP 250 OP 250 PS 637: Performed by: PHYSICIAN ASSISTANT

## 2022-08-04 PROCEDURE — 80053 COMPREHEN METABOLIC PANEL: CPT | Performed by: EMERGENCY MEDICINE

## 2022-08-04 PROCEDURE — 96374 THER/PROPH/DIAG INJ IV PUSH: CPT | Performed by: EMERGENCY MEDICINE

## 2022-08-04 PROCEDURE — 86901 BLOOD TYPING SEROLOGIC RH(D): CPT | Performed by: EMERGENCY MEDICINE

## 2022-08-04 PROCEDURE — 99223 1ST HOSP IP/OBS HIGH 75: CPT | Mod: AI | Performed by: PHYSICIAN ASSISTANT

## 2022-08-04 PROCEDURE — 80053 COMPREHEN METABOLIC PANEL: CPT

## 2022-08-04 PROCEDURE — 99285 EMERGENCY DEPT VISIT HI MDM: CPT | Performed by: EMERGENCY MEDICINE

## 2022-08-04 PROCEDURE — 120N000001 HC R&B MED SURG/OB

## 2022-08-04 PROCEDURE — 84439 ASSAY OF FREE THYROXINE: CPT

## 2022-08-04 PROCEDURE — 87635 SARS-COV-2 COVID-19 AMP PRB: CPT | Performed by: EMERGENCY MEDICINE

## 2022-08-04 PROCEDURE — 85610 PROTHROMBIN TIME: CPT | Performed by: EMERGENCY MEDICINE

## 2022-08-04 PROCEDURE — 99291 CRITICAL CARE FIRST HOUR: CPT | Mod: 25 | Performed by: EMERGENCY MEDICINE

## 2022-08-04 PROCEDURE — 85025 COMPLETE CBC W/AUTO DIFF WBC: CPT

## 2022-08-04 PROCEDURE — C9113 INJ PANTOPRAZOLE SODIUM, VIA: HCPCS | Performed by: EMERGENCY MEDICINE

## 2022-08-04 PROCEDURE — P9016 RBC LEUKOCYTES REDUCED: HCPCS | Performed by: EMERGENCY MEDICINE

## 2022-08-04 PROCEDURE — 85730 THROMBOPLASTIN TIME PARTIAL: CPT | Performed by: EMERGENCY MEDICINE

## 2022-08-04 PROCEDURE — 36415 COLL VENOUS BLD VENIPUNCTURE: CPT | Performed by: EMERGENCY MEDICINE

## 2022-08-04 PROCEDURE — 36415 COLL VENOUS BLD VENIPUNCTURE: CPT

## 2022-08-04 PROCEDURE — 86923 COMPATIBILITY TEST ELECTRIC: CPT | Performed by: EMERGENCY MEDICINE

## 2022-08-04 PROCEDURE — 84443 ASSAY THYROID STIM HORMONE: CPT

## 2022-08-04 PROCEDURE — C9803 HOPD COVID-19 SPEC COLLECT: HCPCS | Performed by: EMERGENCY MEDICINE

## 2022-08-04 RX ORDER — PROCHLORPERAZINE 25 MG
12.5 SUPPOSITORY, RECTAL RECTAL EVERY 12 HOURS PRN
Status: DISCONTINUED | OUTPATIENT
Start: 2022-08-04 | End: 2022-08-08 | Stop reason: HOSPADM

## 2022-08-04 RX ORDER — PROCHLORPERAZINE MALEATE 5 MG
5 TABLET ORAL EVERY 6 HOURS PRN
Status: DISCONTINUED | OUTPATIENT
Start: 2022-08-04 | End: 2022-08-08 | Stop reason: HOSPADM

## 2022-08-04 RX ORDER — LISINOPRIL 10 MG/1
10 TABLET ORAL DAILY
Status: DISCONTINUED | OUTPATIENT
Start: 2022-08-05 | End: 2022-08-08 | Stop reason: HOSPADM

## 2022-08-04 RX ORDER — SERTRALINE HYDROCHLORIDE 100 MG/1
100 TABLET, FILM COATED ORAL DAILY
Status: DISCONTINUED | OUTPATIENT
Start: 2022-08-05 | End: 2022-08-08 | Stop reason: HOSPADM

## 2022-08-04 RX ORDER — ONDANSETRON 2 MG/ML
4 INJECTION INTRAMUSCULAR; INTRAVENOUS EVERY 6 HOURS PRN
Status: DISCONTINUED | OUTPATIENT
Start: 2022-08-04 | End: 2022-08-08 | Stop reason: HOSPADM

## 2022-08-04 RX ORDER — SODIUM CHLORIDE, SODIUM LACTATE, POTASSIUM CHLORIDE, CALCIUM CHLORIDE 600; 310; 30; 20 MG/100ML; MG/100ML; MG/100ML; MG/100ML
INJECTION, SOLUTION INTRAVENOUS CONTINUOUS
Status: DISCONTINUED | OUTPATIENT
Start: 2022-08-05 | End: 2022-08-05

## 2022-08-04 RX ORDER — METOPROLOL TARTRATE 25 MG/1
25 TABLET, FILM COATED ORAL 2 TIMES DAILY
Status: DISCONTINUED | OUTPATIENT
Start: 2022-08-04 | End: 2022-08-08 | Stop reason: HOSPADM

## 2022-08-04 RX ORDER — ONDANSETRON 4 MG/1
4 TABLET, ORALLY DISINTEGRATING ORAL EVERY 6 HOURS PRN
Status: DISCONTINUED | OUTPATIENT
Start: 2022-08-04 | End: 2022-08-08 | Stop reason: HOSPADM

## 2022-08-04 RX ORDER — ACETAMINOPHEN 325 MG/1
650 TABLET ORAL EVERY 6 HOURS PRN
Status: DISCONTINUED | OUTPATIENT
Start: 2022-08-04 | End: 2022-08-08 | Stop reason: HOSPADM

## 2022-08-04 RX ORDER — ACETAMINOPHEN 650 MG/1
650 SUPPOSITORY RECTAL EVERY 6 HOURS PRN
Status: DISCONTINUED | OUTPATIENT
Start: 2022-08-04 | End: 2022-08-08 | Stop reason: HOSPADM

## 2022-08-04 RX ORDER — AMOXICILLIN 250 MG
1 CAPSULE ORAL 2 TIMES DAILY PRN
Status: DISCONTINUED | OUTPATIENT
Start: 2022-08-04 | End: 2022-08-08 | Stop reason: HOSPADM

## 2022-08-04 RX ORDER — LIDOCAINE 40 MG/G
CREAM TOPICAL
Status: DISCONTINUED | OUTPATIENT
Start: 2022-08-04 | End: 2022-08-08 | Stop reason: HOSPADM

## 2022-08-04 RX ORDER — AMOXICILLIN 250 MG
2 CAPSULE ORAL 2 TIMES DAILY PRN
Status: DISCONTINUED | OUTPATIENT
Start: 2022-08-04 | End: 2022-08-08 | Stop reason: HOSPADM

## 2022-08-04 RX ADMIN — METOPROLOL TARTRATE 25 MG: 25 TABLET, FILM COATED ORAL at 23:43

## 2022-08-04 RX ADMIN — PANTOPRAZOLE SODIUM 40 MG: 40 INJECTION, POWDER, FOR SOLUTION INTRAVENOUS at 18:44

## 2022-08-04 ASSESSMENT — ACTIVITIES OF DAILY LIVING (ADL)
WALKING_OR_CLIMBING_STAIRS: STAIR CLIMBING DIFFICULTY, REQUIRES EQUIPMENT;AMBULATION DIFFICULTY, REQUIRES EQUIPMENT
FALL_HISTORY_WITHIN_LAST_SIX_MONTHS: NO
DRESSING/BATHING_DIFFICULTY: NO
TOILETING_ISSUES: NO
DIFFICULTY_EATING/SWALLOWING: NO
CHANGE_IN_FUNCTIONAL_STATUS_SINCE_ONSET_OF_CURRENT_ILLNESS/INJURY: NO
WALKING_OR_CLIMBING_STAIRS_DIFFICULTY: YES
WEAR_GLASSES_OR_BLIND: YES
EQUIPMENT_CURRENTLY_USED_AT_HOME: WALKER, ROLLING;GRAB BAR, TUB/SHOWER
VISION_MANAGEMENT: GLASSES
ADLS_ACUITY_SCORE: 35
DOING_ERRANDS_INDEPENDENTLY_DIFFICULTY: YES
CONCENTRATING,_REMEMBERING_OR_MAKING_DECISIONS_DIFFICULTY: NO
ADLS_ACUITY_SCORE: 26

## 2022-08-04 NOTE — TELEPHONE ENCOUNTER
I was unable to reach patient. So I called patient daughter ( consent to communicate on file). She will go get her mother and bring her in right away.       Informed patient daughter of message below.

## 2022-08-04 NOTE — ED TRIAGE NOTES
Here from clinic with HGB of 5.2, has hx of anemia but also has on plavix & ASA. Denies black/bloody stools, feels fatigued but denies dizziness or chest pain.     Triage Assessment     Row Name 08/04/22 3826       Triage Assessment (Adult)    Airway WDL WDL       Respiratory WDL    Respiratory WDL WDL       Skin Circulation/Temperature WDL    Skin Circulation/Temperature WDL X;all    Skin Circulation pallor    Skin Temperature warm       Cardiac WDL    Cardiac WDL WDL       Peripheral/Neurovascular WDL    Peripheral Neurovascular WDL WDL       Cognitive/Neuro/Behavioral WDL    Cognitive/Neuro/Behavioral WDL WDL

## 2022-08-04 NOTE — TELEPHONE ENCOUNTER
----- Message from Edin Dailey MD sent at 8/4/2022  3:44 PM CDT -----  Please call.  This likely got called as a critical lab. Her hemoglobin is 5.2. That is extremely low. She should be taken to the emergency department right away. She needs this rechecked and will need further evaluation and treatment.     Edin Dailey MD

## 2022-08-05 ENCOUNTER — APPOINTMENT (OUTPATIENT)
Dept: CT IMAGING | Facility: CLINIC | Age: 87
DRG: 378 | End: 2022-08-05
Attending: INTERNAL MEDICINE
Payer: COMMERCIAL

## 2022-08-05 PROBLEM — I95.9 HYPOTENSION: Status: ACTIVE | Noted: 2022-08-05

## 2022-08-05 LAB
ANION GAP SERPL CALCULATED.3IONS-SCNC: 12 MMOL/L (ref 3–14)
ANION GAP SERPL CALCULATED.3IONS-SCNC: 6 MMOL/L (ref 3–14)
BASE EXCESS BLDV CALC-SCNC: -2.6 MMOL/L (ref -7.7–1.9)
BUN SERPL-MCNC: 24 MG/DL (ref 7–30)
BUN SERPL-MCNC: 25 MG/DL (ref 7–30)
CALCIUM SERPL-MCNC: 7.8 MG/DL (ref 8.5–10.1)
CALCIUM SERPL-MCNC: 8.5 MG/DL (ref 8.5–10.1)
CHLORIDE BLD-SCNC: 108 MMOL/L (ref 94–109)
CHLORIDE BLD-SCNC: 111 MMOL/L (ref 94–109)
CO2 SERPL-SCNC: 19 MMOL/L (ref 20–32)
CO2 SERPL-SCNC: 25 MMOL/L (ref 20–32)
CREAT SERPL-MCNC: 1.02 MG/DL (ref 0.52–1.04)
CREAT SERPL-MCNC: 1.04 MG/DL (ref 0.52–1.04)
ERYTHROCYTE [DISTWIDTH] IN BLOOD BY AUTOMATED COUNT: 17.9 % (ref 10–15)
ERYTHROCYTE [DISTWIDTH] IN BLOOD BY AUTOMATED COUNT: 18.1 % (ref 10–15)
ERYTHROCYTE [DISTWIDTH] IN BLOOD BY AUTOMATED COUNT: 18.2 % (ref 10–15)
GFR SERPL CREATININE-BSD FRML MDRD: 51 ML/MIN/1.73M2
GFR SERPL CREATININE-BSD FRML MDRD: 53 ML/MIN/1.73M2
GLUCOSE BLD-MCNC: 167 MG/DL (ref 70–99)
GLUCOSE BLD-MCNC: 88 MG/DL (ref 70–99)
HCO3 BLDV-SCNC: 23 MMOL/L (ref 21–28)
HCT VFR BLD AUTO: 24.4 % (ref 35–47)
HCT VFR BLD AUTO: 32.3 % (ref 35–47)
HCT VFR BLD AUTO: 41 % (ref 35–47)
HGB BLD-MCNC: 12.2 G/DL (ref 11.7–15.7)
HGB BLD-MCNC: 7.2 G/DL (ref 11.7–15.7)
HGB BLD-MCNC: 7.2 G/DL (ref 11.7–15.7)
HGB BLD-MCNC: 7.5 G/DL (ref 11.7–15.7)
HGB BLD-MCNC: 9.8 G/DL (ref 11.7–15.7)
LACTATE SERPL-SCNC: 1 MMOL/L (ref 0.7–2)
LACTATE SERPL-SCNC: 2.9 MMOL/L (ref 0.7–2)
MCH RBC QN AUTO: 23.3 PG (ref 26.5–33)
MCH RBC QN AUTO: 23.5 PG (ref 26.5–33)
MCH RBC QN AUTO: 23.7 PG (ref 26.5–33)
MCHC RBC AUTO-ENTMCNC: 29.5 G/DL (ref 31.5–36.5)
MCHC RBC AUTO-ENTMCNC: 29.8 G/DL (ref 31.5–36.5)
MCHC RBC AUTO-ENTMCNC: 30.3 G/DL (ref 31.5–36.5)
MCV RBC AUTO: 78 FL (ref 78–100)
MCV RBC AUTO: 79 FL (ref 78–100)
MCV RBC AUTO: 79 FL (ref 78–100)
O2/TOTAL GAS SETTING VFR VENT: 0 %
PCO2 BLDV: 44 MM HG (ref 40–50)
PH BLDV: 7.34 [PH] (ref 7.32–7.43)
PLATELET # BLD AUTO: 237 10E3/UL (ref 150–450)
PLATELET # BLD AUTO: 279 10E3/UL (ref 150–450)
PLATELET # BLD AUTO: 429 10E3/UL (ref 150–450)
PO2 BLDV: 25 MM HG (ref 25–47)
POTASSIUM BLD-SCNC: 4.6 MMOL/L (ref 3.4–5.3)
POTASSIUM BLD-SCNC: 4.7 MMOL/L (ref 3.4–5.3)
RBC # BLD AUTO: 3.09 10E6/UL (ref 3.8–5.2)
RBC # BLD AUTO: 4.14 10E6/UL (ref 3.8–5.2)
RBC # BLD AUTO: 5.2 10E6/UL (ref 3.8–5.2)
SODIUM SERPL-SCNC: 139 MMOL/L (ref 133–144)
SODIUM SERPL-SCNC: 142 MMOL/L (ref 133–144)
T4 FREE SERPL-MCNC: 0.85 NG/DL (ref 0.9–1.7)
VIT B12 SERPL-MCNC: 260 PG/ML (ref 232–1245)
WBC # BLD AUTO: 20.6 10E3/UL (ref 4–11)
WBC # BLD AUTO: 23.5 10E3/UL (ref 4–11)
WBC # BLD AUTO: 5.3 10E3/UL (ref 4–11)

## 2022-08-05 PROCEDURE — 85018 HEMOGLOBIN: CPT | Performed by: INTERNAL MEDICINE

## 2022-08-05 PROCEDURE — 258N000003 HC RX IP 258 OP 636: Performed by: PHYSICIAN ASSISTANT

## 2022-08-05 PROCEDURE — 82607 VITAMIN B-12: CPT | Performed by: INTERNAL MEDICINE

## 2022-08-05 PROCEDURE — 250N000011 HC RX IP 250 OP 636: Performed by: PHYSICIAN ASSISTANT

## 2022-08-05 PROCEDURE — G0378 HOSPITAL OBSERVATION PER HR: HCPCS

## 2022-08-05 PROCEDURE — 36415 COLL VENOUS BLD VENIPUNCTURE: CPT | Performed by: PHYSICIAN ASSISTANT

## 2022-08-05 PROCEDURE — 250N000011 HC RX IP 250 OP 636: Performed by: INTERNAL MEDICINE

## 2022-08-05 PROCEDURE — 74177 CT ABD & PELVIS W/CONTRAST: CPT

## 2022-08-05 PROCEDURE — 99291 CRITICAL CARE FIRST HOUR: CPT | Performed by: INTERNAL MEDICINE

## 2022-08-05 PROCEDURE — 80048 BASIC METABOLIC PNL TOTAL CA: CPT | Performed by: INTERNAL MEDICINE

## 2022-08-05 PROCEDURE — 80048 BASIC METABOLIC PNL TOTAL CA: CPT | Performed by: PHYSICIAN ASSISTANT

## 2022-08-05 PROCEDURE — 250N000011 HC RX IP 250 OP 636: Performed by: RADIOLOGY

## 2022-08-05 PROCEDURE — 82803 BLOOD GASES ANY COMBINATION: CPT | Performed by: INTERNAL MEDICINE

## 2022-08-05 PROCEDURE — 258N000003 HC RX IP 258 OP 636: Performed by: INTERNAL MEDICINE

## 2022-08-05 PROCEDURE — 120N000001 HC R&B MED SURG/OB

## 2022-08-05 PROCEDURE — 36415 COLL VENOUS BLD VENIPUNCTURE: CPT | Performed by: INTERNAL MEDICINE

## 2022-08-05 PROCEDURE — C9113 INJ PANTOPRAZOLE SODIUM, VIA: HCPCS | Performed by: PHYSICIAN ASSISTANT

## 2022-08-05 PROCEDURE — 250N000013 HC RX MED GY IP 250 OP 250 PS 637: Performed by: PHYSICIAN ASSISTANT

## 2022-08-05 PROCEDURE — 83605 ASSAY OF LACTIC ACID: CPT | Performed by: INTERNAL MEDICINE

## 2022-08-05 PROCEDURE — 85027 COMPLETE CBC AUTOMATED: CPT | Performed by: PHYSICIAN ASSISTANT

## 2022-08-05 PROCEDURE — 250N000009 HC RX 250: Performed by: INTERNAL MEDICINE

## 2022-08-05 PROCEDURE — 250N000009 HC RX 250: Performed by: RADIOLOGY

## 2022-08-05 RX ORDER — IOPAMIDOL 755 MG/ML
62 INJECTION, SOLUTION INTRAVASCULAR ONCE
Status: COMPLETED | OUTPATIENT
Start: 2022-08-05 | End: 2022-08-05

## 2022-08-05 RX ORDER — METHYLPREDNISOLONE SODIUM SUCCINATE 125 MG/2ML
125 INJECTION, POWDER, LYOPHILIZED, FOR SOLUTION INTRAMUSCULAR; INTRAVENOUS
Status: DISCONTINUED | OUTPATIENT
Start: 2022-08-05 | End: 2022-08-06

## 2022-08-05 RX ORDER — DIPHENHYDRAMINE HYDROCHLORIDE 50 MG/ML
50 INJECTION INTRAMUSCULAR; INTRAVENOUS
Status: DISCONTINUED | OUTPATIENT
Start: 2022-08-05 | End: 2022-08-05

## 2022-08-05 RX ORDER — SODIUM CHLORIDE, SODIUM LACTATE, POTASSIUM CHLORIDE, CALCIUM CHLORIDE 600; 310; 30; 20 MG/100ML; MG/100ML; MG/100ML; MG/100ML
INJECTION, SOLUTION INTRAVENOUS CONTINUOUS
Status: DISCONTINUED | OUTPATIENT
Start: 2022-08-05 | End: 2022-08-07

## 2022-08-05 RX ADMIN — ONDANSETRON 4 MG: 4 TABLET, ORALLY DISINTEGRATING ORAL at 12:40

## 2022-08-05 RX ADMIN — PANTOPRAZOLE SODIUM 40 MG: 40 INJECTION, POWDER, FOR SOLUTION INTRAVENOUS at 18:53

## 2022-08-05 RX ADMIN — METOPROLOL TARTRATE 25 MG: 25 TABLET, FILM COATED ORAL at 08:17

## 2022-08-05 RX ADMIN — PANTOPRAZOLE SODIUM 40 MG: 40 INJECTION, POWDER, FOR SOLUTION INTRAVENOUS at 06:26

## 2022-08-05 RX ADMIN — METOPROLOL TARTRATE 25 MG: 25 TABLET, FILM COATED ORAL at 19:01

## 2022-08-05 RX ADMIN — PROCHLORPERAZINE EDISYLATE 5 MG: 5 INJECTION, SOLUTION INTRAMUSCULAR; INTRAVENOUS at 13:24

## 2022-08-05 RX ADMIN — SERTRALINE HYDROCHLORIDE 100 MG: 100 TABLET ORAL at 08:17

## 2022-08-05 RX ADMIN — LISINOPRIL 10 MG: 10 TABLET ORAL at 08:17

## 2022-08-05 RX ADMIN — SODIUM CHLORIDE 57 ML: 9 INJECTION, SOLUTION INTRAVENOUS at 16:59

## 2022-08-05 RX ADMIN — EPINEPHRINE 0.3 MG: 1 INJECTION INTRAMUSCULAR; INTRAVENOUS; SUBCUTANEOUS at 13:56

## 2022-08-05 RX ADMIN — SODIUM CHLORIDE, POTASSIUM CHLORIDE, SODIUM LACTATE AND CALCIUM CHLORIDE 500 ML: 600; 310; 30; 20 INJECTION, SOLUTION INTRAVENOUS at 16:30

## 2022-08-05 RX ADMIN — SODIUM CHLORIDE, POTASSIUM CHLORIDE, SODIUM LACTATE AND CALCIUM CHLORIDE: 600; 310; 30; 20 INJECTION, SOLUTION INTRAVENOUS at 21:24

## 2022-08-05 RX ADMIN — SODIUM CHLORIDE, POTASSIUM CHLORIDE, SODIUM LACTATE AND CALCIUM CHLORIDE: 600; 310; 30; 20 INJECTION, SOLUTION INTRAVENOUS at 04:22

## 2022-08-05 RX ADMIN — SODIUM CHLORIDE 250 MG: 9 INJECTION, SOLUTION INTRAVENOUS at 10:22

## 2022-08-05 RX ADMIN — IOPAMIDOL 62 ML: 755 INJECTION, SOLUTION INTRAVENOUS at 16:59

## 2022-08-05 ASSESSMENT — ACTIVITIES OF DAILY LIVING (ADL)
ADLS_ACUITY_SCORE: 38
DEPENDENT_IADLS:: SHOPPING;MEAL PREPARATION;TRANSPORTATION
ADLS_ACUITY_SCORE: 38
ADLS_ACUITY_SCORE: 26
ADLS_ACUITY_SCORE: 38
ADLS_ACUITY_SCORE: 38

## 2022-08-05 NOTE — PROGRESS NOTES
[facility] Progress Note  [facility]  [facility]    Saw pt Remedios Vincent per  request.    Illness Narrative - Rmeedios came in with anemia. Cassia, her daughter was in the room with her.  Remedios has had some other medical issues but hopes to go home today.      Distress - Remedios is somewhat estranged from her other 2 children and she wanted to pray for some reconciliation with them.      Coping - She has strong support from Cassia and other friends and folks from her [place].      Meaning-Making - She is very grateful for having living a good life.      Plan - [facility] remains available if there are other needs.    Amber Hirsch  Associate   Pager number:  [phone]

## 2022-08-05 NOTE — H&P
[facility]    History and Physical - Hospitalist Service       Date of Admission:  8/4/2022    Assessment & Plan      Remedios Vincnet is a 88 year old female admitted on 8/4/2022. She has history of stroke, hyperlipidemia, CKD, hypertension and hypothyroidism.  She presents due to anemia found on outpatient labs.    Acute on Chronic Anemia  Iron Deficiency  GI Bleed  Hgb 5.2 on presentation. Last 9.3 on 4/21/22. During hospitalization 3/2022, trended down to 6.9. Signs of GI bleed at that time, refused endoscopy secondary to age. Hgb was normal prior to starting DAPT due to CVA in 1/2022. Iron studies previously consistent with iron deficiency.  No history of blood in stool, melena, hematemesis or coffee-ground emesis.  Appears to have acute on chronic anemia, likely related to GI bleeding associated with DAPT therapy started after CVA. Suspect either a chronic/slow bleed versus intermittent. In ED stool appeared brown though was hemoccult positive. Receiving 2 units of pRBC.  She apparently again refused scopes in the emergency department though on admission she is agreeable to an upper endoscopy to further evaluate.  - Tranfusing 2 pRBC  - recheck hemoglobin 2 hours following and then Q8 hours  - IV pantoprazole 40 mg Q12 hours  - discussed endoscopy, patient now agreeable to upper endoscopy, refuses colonoscopy. Will need to contact surgery tomorrow  - diet: Clear liquid diet, n.p.o. at midnight    Ischemic Stroke 1/2022  Hyperlipidemia  Started on aspirin and clopidogrel for this, recommended DAPT x 3 moth and then aspirin 325 mg monotherapy. It appears she has continued on DAPT therapy.   - holding aspirin and clopidogrel, if able to safely resume would stop clopidogrel and start aspirin 325 mg (as tolerated)  - not currently on statin, defer to PCP    CKD  On admission, creatinine 1.05, GFR 51. Recent baseline creatinine 0.88-1.1 and GFR 46-63. Stable.  - follow BMP  - avoid  nephrotoxic agents as able    Hypertension  Chronic. Blood pressures reviewed, stable.   - continue home lisinopril, metoprolol with parameters    Hypothyroidism  Chronic.   - continue home levothyroxine     Diet: NPO for Medical/Clinical Reasons Except for: Meds, Ice Chips    DVT Prophylaxis: Pneumatic Compression Devices  Bhatt Catheter: Not present  Central Lines: None  Cardiac Monitoring: None  Code Status: No CPR- Do NOT Intubate , discussed on admission    Clinically Significant Risk Factors Present on Admission             # Hypoalbuminemia: Albumin = 3.2 g/dL (Ref range: 3.4 - 5.0 g/dL) on admission, will monitor as appropriate    # Platelet Defect: home medication list includes an antiplatelet medication  # Hypertension: home medication list includes antihypertensive(s)          Disposition Plan      Expected Discharge Date: 08/06/2022                The patient's care was discussed with the Attending Physician, Dr. Robert Bejarano, Bedside Nurse and Patient.    Shavon Mcgill PA-C  Hospitalist Service  [facility]  Securely message with the Vocera Web Console (learn more here)  Text page via Bringg Paging/Directory         ______________________________________________________________________    Chief Complaint   Abnormal labs    History is obtained from the patient and electronic health record    History of Present Illness   Remedios Vincent is a 88 year old female who presents due to abnormal lab.    She states she has been in her usual state of health.  She presented for outpatient lab work and her hemoglobin returned at 5.2 and so she was instructed to present to the emergency department for further evaluation.  She feels perhaps a little fatigued though not all that different from her usual.  She denies any lightheadedness, dizziness, shortness of breath, abdominal discomfort, nausea, vomiting, blood in her stool, melena, hematemesis or coffee-ground emesis.  She is surprised  to hear that her hemoglobin is so low given that she has been feeling so well.      He was hospitalized in January 2022 due to a ischemic stroke.  She was started on dual antiplatelet therapy at that time.  She was then hospitalized in March 2022 with gastroenteritis.  At that time her hemoglobin trended down to 6.9 and she required a blood transfusion.  Upper endoscopy and colonoscopy were recommended at that time but the patient declined.  She has since had her hemoglobin checked twice and it was stable around 9, last checked 4/21/2022.    Review of Systems    The 10 point Review of Systems is negative other than noted in the HPI or here.     Past Medical History    I have reviewed this patient's medical history and updated it with pertinent information if needed.   Past Medical History:   Diagnosis Date     Anxiety      CKD (chronic kidney disease), stage III (H)      Depression      HLD (hyperlipidemia)      HTN (hypertension)      Hypothyroidism      Lumbar spondylosis      Osteoporosis        Past Surgical History   I have reviewed this patient's surgical history and updated it with pertinent information if needed.  Past Surgical History:   Procedure Laterality Date      REMOVAL OF TONSILS,<11 Y/O      Description: Tonsillectomy;  Recorded: 05/22/2014;     [facility] REMOVAL OF OVARY(S)      Description: Oophorectomy;  Recorded: 05/22/2014;     [facility] TOTAL ABDOM HYSTERECTOMY      Description: Hysterectomy;  Recorded: 05/22/2014;     [facility] TOTAL ABDOM HYSTERECTOMY      Description: Total Abdominal Hysterectomy;  Recorded: 05/22/2014;       Social History   I have reviewed this patient's social history and updated it with pertinent information if needed.  Social History     Tobacco Use     Smoking status: Never Smoker     Smokeless tobacco: Never Used   Substance Use Topics     Alcohol use: No       Family History   I have reviewed this patient's family history and updated it with pertinent information if needed.  Family  History   Problem Relation Age of Onset     Cerebrovascular Disease Sister      Cancer Brother      Dementia Sister      Cancer Sister          Prior to Admission Medications   Prior to Admission Medications   Prescriptions Last Dose Informant Patient Reported? Taking?   aspirin (ASA) 81 MG EC tablet 8/4/2022 at am Daughter No Yes   Sig: Take 1 tablet (81 mg) by mouth daily   clopidogrel (PLAVIX) 75 MG tablet 8/4/2022 at am Daughter No Yes   Sig: Take 1 tablet (75 mg) by mouth daily   cyanocobalamin (VITAMIN B-12) 1000 MCG SUBL sublingual tablet More than a month at Unknown time Daughter No Yes   Sig: Place 1 tablet (1,000 mcg) under the tongue daily   folic acid (FOLVITE) 1 MG tablet More than a month at Unknown time Daughter No Yes   Sig: Take 1 tablet (1 mg) by mouth daily   lisinopril (ZESTRIL) 10 MG tablet 8/4/2022 at am Daughter No Yes   Sig: TAKE 1 TABLET BY MOUTH EVERY DAY   melatonin 5 MG tablet 8/3/2022 at hs Daughter Yes Yes   Sig: Take 5 mg by mouth At Bedtime   metoprolol tartrate (LOPRESSOR) 25 MG tablet 8/4/2022 at am Daughter No Yes   Sig: Take 1 tablet (25 mg) by mouth 2 times daily   pantoprazole (PROTONIX) 40 MG EC tablet More than a month at Unknown time Daughter No Yes   Sig: Take 1 tablet (40 mg) by mouth every morning (before breakfast)   sertraline (ZOLOFT) 100 MG tablet 8/4/2022 at am Daughter No Yes   Sig: Take 1 tablet (100 mg) by mouth daily      Facility-Administered Medications: None     Allergies   Allergies   Allergen Reactions     Seafood Unknown       Physical Exam   Vital Signs: Temp: 97.4  F (36.3  C) Temp src: Oral BP: (!) 152/70 Pulse: 73   Resp: 18 SpO2: 94 % O2 Device: None (Room air)    Weight: 138 lbs .13 oz    Constitutional: Well-appearing, sitting up in bed, awake, alert, cooperative, no apparent distress, and appears stated age  ENT: Oral pharynx is clear and moist  Respiratory: No increased work of breathing, good air exchange, clear to auscultation bilaterally, no  crackles or wheezing  Cardiovascular: Regular rate and rhythm.  Trace bilateral lower extremity edema.  GI: Normal bowel sounds, soft, nondistended, nontender  Genitounirinary: Deferred  Skin: Slightly pale.  Warm and dry  Musculoskeletal: Moving all 4 extremities  Neurologic: Awake, alert, oriented.  Neuropsychiatric: Calm, pleasant, cooperative.  Appropriate thought process and content.  Short-term memory grossly intact.    Data   Data reviewed today: I reviewed all medications, new labs and imaging results over the last 24 hours. I personally reviewed no images or EKG's today.    Recent Labs   Lab 08/04/22  1807 08/04/22  1531   WBC  --  4.9   HGB  --  5.2*   MCV  --  76*   PLT  --  299   INR 1.02  --     132*   POTASSIUM 4.8 4.7   CHLORIDE 108 101   CO2 25 24   BUN 25 25.5*   CR 1.05* 1.20*   ANIONGAP 7 7   LUBNA 8.2* 8.6*   * 101*   ALBUMIN 3.2*  --    PROTTOTAL 6.7*  --    BILITOTAL 0.1*  --    ALKPHOS 69  --    ALT 15  --    AST 14  --      No results found for this or any previous visit (from the past 24 hour(s)).

## 2022-08-05 NOTE — PROGRESS NOTES
"Patient returned from CT. BP rechecked left arm 189/85. Reports \" I am just so cold\" . Afebrile. Warm blankets provided to patient.  Had another large stool, this time liquid. Weak. Bolus completed. Due to elevated BP will web page Dr Tobias to see if he still wants maintainenance IV fluids.  "

## 2022-08-05 NOTE — PROGRESS NOTES
Called to see due to hypotension.  HPI - Patient admitted for acute on chronic anemia hemoglobin of 5 and was transfused 2 units packed red blood cells and there was initially plan for upper endoscopy.  However this morning patient decided she was adamantly against endoscopy.  Hemoglobin this morning was 7.2.  Diet was started and hemoglobin was rechecked after 6 hours and was 7.5.  Ferritin is low consistent with iron deficiency.  Patient received iron infusion during an admission in March but she has not been on iron supplementation since. Ordered Ferlicit IV x1 with plan to discharge this afternoon on oral iron.    Near end of iron infusion, patient had emesis of her lunch.  Short time later she needed to defecate.  While on the commode and after defecating she became very weak and lightheaded.  She developed lower abdominal pain.  She is found to be hypotensive.  I was called to see.  Patient appeared to be having a vagal reaction with cool and clammy skin, hypotension without tachycardia.  No hives and no evidence of mouth swelling or difficulty breathing.  Lungs were clear.  There was abdominal tenderness bilaterally but especially in the lower quadrants.  Bowel sounds are normal.  Fluid bolus ordered.  Shortly after this patient became even more hypotensive and an RRT was called.  In Trendelenburg and with fluid bolus patient had some improvement in blood pressure.  Given onset at the end of iron infusion, was concerned that this is a reaction.  Epinephrine given IM.  Blood pressure improved and patient appeared more stable.  Labs were obtained and lactic acid was 2.9.  CBC was notable for white count now 23.5 which likely reflects demargination from this event but surprisingly the hemoglobin is 12.2.  Suspect hemoconcentration due to profound third spacing.  CT abdomen was obtained and shows circumferential bowel wall swelling of the descending colon which was interpreted as colitis by the radiologist.   However, symptom onset was too rapid for colitis and likely this represents angioedema related to anaphylaxis.   Currently appears hemodynamically stable and does not need ICU transfer.  Blood pressure actually running high but I am reticent to give antihypertensive at this time.  We will continue IV fluids I suspect still intravascularly volume depleted from the third spacing.  We will recheck labs at 8:00 tonight.  Continue hospitalization at least another night.  Patient is still weak and ill-appearing despite other clinical improvement.  40 minutes critical care independent of other providers or procedures  George Tobias MD  [facility]

## 2022-08-05 NOTE — UTILIZATION REVIEW
Concurrent stay review; Secondary Review Determination     [facility]          Under the authority of the Utilization Management Committee, the utilization review process indicated a secondary review on the above patient.  The review outcome is based on review of the medical records, discussions with staff, and applying clinical experience noted on the date of the review.          (x) Observation Status Appropriate - Concurrent stay review    RATIONALE FOR DETERMINATION   88 year old female admitted on 8/4/2022. She has history of stroke, hyperlipidemia, CKD, hypertension and hypothyroidism.  She presents due to anemia found on outpatient labs.   Hgb 5.2 on presentation. Last 9.3 on 4/21/22. During hospitalization 3/2022, trended down to 6.9. Signs of GI bleed at that time, refused endoscopy secondary to age. Hgb was normal prior to starting DAPT due to CVA in 1/2022. Iron studies previously consistent with iron deficiency.  No history of blood in stool, melena, hematemesis or coffee-ground emesis.  Appears to have acute on chronic anemia, likely related to GI bleeding associated with DAPT therapy started after CVA. Suspect either a chronic/slow bleed versus intermittent.   Patient hemoglobin has been stable after transfusion 7.2, 7.5 no evidence of active bleeding, she is waiting for upper GI endoscopy   The severity of illness, intensity of service provided, expected LOS and risk for adverse outcome make the care appropriate for observation.      This document was produced using voice recognition software       The information on this document is developed by the utilization review team in order for the business office to ensure compliance.  This only denotes the appropriateness of proper admission status and does not reflect the quality of care rendered.         The definitions of Inpatient Status and Observation Status used in making the determination above are those provided in the CMS Coverage  Manual, Chapter 1 and Chapter 6, section 70.4.      Sincerely,     ORLANDO SUE MD    System Medical Director  Utilization Management  [facility].

## 2022-08-05 NOTE — ED PROVIDER NOTES
History     Chief Complaint   Patient presents with     Generalized Weakness     low hemoglobin     HPI  Remedios Vincent is a 88 year old female who presents from assisted living where she takes care of her self and lives on her own.  Incidental finding on outpatient lab hemoglobin 5.2.  Describes generalized fatigue and dyspnea exertion.  Denies chest pain.  No near syncope or fall.  Dual antiplatelet therapy.  Denies black or bloody stool.  Declines upper or lower endoscopy.  Denies hematemesis or coffee-ground emesis.  Denies abdominal pain.  No recent febrile illness cough congestion sore throat.    Allergies:  Allergies   Allergen Reactions     Seafood Unknown       Problem List:    Patient Active Problem List    Diagnosis Date Noted     Gastrointestinal hemorrhage, unspecified gastrointestinal hemorrhage type 08/04/2022     Priority: Medium     Gastroenteritis 03/29/2022     Priority: Medium     Malaise 03/29/2022     Priority: Medium     Anemia, unspecified type 03/29/2022     Priority: Medium     Ischemic stroke (H) 01/13/2022     Priority: Medium     Chronic kidney disease, stage III (moderate) (H)      Priority: Medium     Created by Conversion         Hyperlipidemia      Priority: Medium     Created by Conversion         Fatigue      Priority: Medium     Created by Conversion         Primary osteoarthritis of both knees 08/11/2017     Priority: Medium     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     Priority: Medium     Pain of left sternoclavicular joint 08/11/2017     Priority: Medium     Hypothyroidism      Priority: Medium     Created by Conversion  Replacement Utility updated for latest IMO load         Anxiety      Priority: Medium     Created by Conversion  Replacement Utility updated for latest IMO load         Essential hypertension      Priority: Medium     Created by Conversion  Replacement Utility updated for latest IMO load         Osteoporosis      Priority: Medium     Created by Conversion  Replacement  "Utility updated for latest IMO load         Lumbar Spondylosis      Priority: Medium     Created by Conversion         Lower Back Pain      Priority: Medium     Created by Conversion            Past Medical History:    Past Medical History:   Diagnosis Date     Anxiety      CKD (chronic kidney disease), stage III (H)      Depression      HLD (hyperlipidemia)      HTN (hypertension)      Hypothyroidism      Lumbar spondylosis      Osteoporosis        Past Surgical History:    Past Surgical History:   Procedure Laterality Date     [facility] REMOVAL OF TONSILS,<11 Y/O      Description: Tonsillectomy;  Recorded: 05/22/2014;     [facility] REMOVAL OF OVARY(S)      Description: Oophorectomy;  Recorded: 05/22/2014;     [facility] TOTAL ABDOM HYSTERECTOMY      Description: Hysterectomy;  Recorded: 05/22/2014;     [facility] TOTAL ABDOM HYSTERECTOMY      Description: Total Abdominal Hysterectomy;  Recorded: 05/22/2014;       Family History:    No family history on file.    Social History:  Marital Status:   [5]  Social History     Tobacco Use     Smoking status: Never Smoker     Smokeless tobacco: Never Used   Substance Use Topics     Alcohol use: No        Medications:    aspirin (ASA) 81 MG EC tablet  clopidogrel (PLAVIX) 75 MG tablet  cyanocobalamin (VITAMIN B-12) 1000 MCG SUBL sublingual tablet  folic acid (FOLVITE) 1 MG tablet  lisinopril (ZESTRIL) 10 MG tablet  melatonin 5 MG tablet  metoprolol tartrate (LOPRESSOR) 25 MG tablet  pantoprazole (PROTONIX) 40 MG EC tablet  sertraline (ZOLOFT) 100 MG tablet          Review of Systems  All other systems reviewed and are negative.    Physical Exam   BP: (!) 150/80  Pulse: 82  Temp: 97.7  F (36.5  C)  Resp: 18  Height: 162.6 cm (5' 4\")  Weight: 68 kg (150 lb)  SpO2: 99 %      Physical Exam  Nontoxic appearing no respiratory distress alert and oriented ×3, pallorous skin warm and dry  Head atraumatic normocephalic  Neck supple full active painless range of motion  Lungs clear to auscultation  Heart " regular no murmur  Abdomen soft nontender bowel sounds positive  Rectal exam Brown stool guaiac positive, no mass appreciated, diminished anal tone  Strength and sensation grossly intact throughout the extremities  Speech is fluent, good eye contact, thought processes are rational  Lower extremities without swelling, redness or tenderness  Pedal pulses symmetrical and strong    ED Course                 Procedures              Critical Care time:  none               Results for orders placed or performed during the hospital encounter of 08/04/22 (from the past 24 hour(s))   Prepare red blood cells (unit)   Result Value Ref Range    CROSSMATCH Compatible     UNIT ABO/RH O Pos     Unit Number [id number]     Unit Status Issued     Blood Component Type Red Blood Cells     Product Code P2776B02     CODING SYSTEM [id number]     UNIT TYPE ISBT 5100     ISSUE DATE AND TIME [id number]    Prepare red blood cells (unit)   Result Value Ref Range    CROSSMATCH Compatible     UNIT ABO/RH O Pos     Unit Number [id number]     Unit Status Ready     Blood Component Type Red Blood Cells     Product Code V3234H70     CODING SYSTEM [id number]     UNIT TYPE ISBT 5100    INR   Result Value Ref Range    INR 1.02 0.85 - 1.15   Partial thromboplastin time   Result Value Ref Range    aPTT 25 22 - 38 Seconds   ABO/Rh type and screen    Narrative    The following orders were created for panel order ABO/Rh type and screen.  Procedure                               Abnormality         Status                     ---------                               -----------         ------                     Adult Type and Screen[[id number]]                            Edited Result - FINAL        Please view results for these tests on the individual orders.   [place] Draw    Narrative    The following orders were created for panel order [place] Draw.  Procedure                               Abnormality         Status                     ---------                                -----------         ------                     Extra Red Top Tube[[id number]]                               Final result               Extra Green Top (Lithium...[[id number]]                      Final result                 Please view results for these tests on the individual orders.   Adult Type and Screen   Result Value Ref Range    ABO/RH(D) O POS     Antibody Screen Negative Negative    SPECIMEN EXPIRATION DATE [id number]    Extra Red Top Tube   Result Value Ref Range    Hold Specimen     Extra Green Top (Lithium Heparin) Tube   Result Value Ref Range    Hold Specimen     Comprehensive metabolic panel   Result Value Ref Range    Sodium 140 133 - 144 mmol/L    Potassium 4.8 3.4 - 5.3 mmol/L    Chloride 108 94 - 109 mmol/L    Carbon Dioxide (CO2) 25 20 - 32 mmol/L    Anion Gap 7 3 - 14 mmol/L    Urea Nitrogen 25 7 - 30 mg/dL    Creatinine 1.05 (H) 0.52 - 1.04 mg/dL    Calcium 8.2 (L) 8.5 - 10.1 mg/dL    Glucose 103 (H) 70 - 99 mg/dL    Alkaline Phosphatase 69 40 - 150 U/L    AST 14 0 - 45 U/L    ALT 15 0 - 50 U/L    Protein Total 6.7 (L) 6.8 - 8.8 g/dL    Albumin 3.2 (L) 3.4 - 5.0 g/dL    Bilirubin Total 0.1 (L) 0.2 - 1.3 mg/dL    GFR Estimate 51 (L) >60 mL/min/1.73m2   Occult blood stool   Result Value Ref Range    Occult Blood Positive (A) Negative   Asymptomatic COVID-19 Virus (Coronavirus) by PCR Nose    Specimen: Nose; Swab   Result Value Ref Range    SARS CoV2 PCR Negative Negative    Narrative    Testing was performed using the lexis  SARS-CoV-2 & Influenza A/B Assay on the lexis  [employer]  System.  This test should be ordered for the detection of SARS-COV-2 in individuals who meet SARS-CoV-2 clinical and/or epidemiological criteria. Test performance is unknown in asymptomatic patients.  This test is for in vitro diagnostic use under the FDA EUA for laboratories certified under CLIA to perform moderate and/or high complexity testing. This test has not been FDA cleared or approved.  A  negative test does not rule out the presence of PCR inhibitors in the specimen or target RNA in concentration below the limit of detection for the assay. The possibility of a false negative should be considered if the patient's recent exposure or clinical presentation suggests COVID-19.  [facility] are certified under the Clinical Laboratory Improvement Amendments of 1988 (CLIA-88) as qualified to perform moderate and/or high complexity laboratory testing.       Medications   pantoprazole (PROTONIX) IV push injection 40 mg (40 mg Intravenous Given 8/4/22 1844)       Assessments & Plan (with Medical Decision Making)  88-year-old female dual antiplatelet therapy outpatient lab draw hemoglobin 5.2.  Guaiac positive brown stool.  Admit to hospital service, refuses scopes.  Protonix 40 mg IV.  Ordered 2 units packed red cells.  Reviewed with Shavon Hoffman who accepts for hospital service     I have reviewed the nursing notes.    I have reviewed the findings, diagnosis, plan and need for follow up with the patient.       New Prescriptions    No medications on file       Final diagnoses:   Anemia, unspecified type   Gastrointestinal hemorrhage, unspecified gastrointestinal hemorrhage type       8/4/2022   [facility] EMERGENCY DEPT     Chintan Neves MD  08/04/22 2040

## 2022-08-05 NOTE — PROGRESS NOTES
"[facility] ADMISSION NOTE    Patient admitted to room 2315 at approximately 2115 via cart from emergency room. Patient was accompanied by transport tech.     Verbal SBAR report received from TIFFANY Waller prior to patient arrival.     Patient ambulated to bed with one assist. Patient alert and oriented X 3. The patient is not having any pain.  . Admission vital signs: Blood pressure 121/56, pulse 68, temperature 97.4  F (36.3  C), temperature source Oral, resp. rate 18, height 1.626 m (5' 4\"), weight 62.6 kg (138 lb 0.1 oz), SpO2 94 %. Patient was oriented to plan of care, call light, bed controls, tv, telephone, bathroom and visiting hours.     Risk Assessment    The following safety risks were identified during admission: fall. Yellow risk band applied: YES.     Skin Initial Assessment    This writer admitted this patient and completed a full skin assessment and John score in the Adult PCS flowsheet. Appropriate interventions initiated as needed. Patient declined secondary skin assessment at this time.      Education    Patient has a [place] to Observation order: No  Observation education completed and documented: N/A      Eleonora Yo RN    "

## 2022-08-05 NOTE — PROVIDER NOTIFICATION
08/05/22 1345 08/05/22 1400   Vital Signs   Pulse (!) 41 74   BP (!) 51/35 102/49   Writer called RRT due to BP 51/35 , RR 41. Epi 0.3 mg IM given per Dr Tobias , Dr Tobias at beside. BP improved with IV fluids and epi.   Clare , daughter, called and updated regarding change in condition, interventions done, current VS and plan for labs.

## 2022-08-05 NOTE — PROGRESS NOTES
Patient alert and oriented x 3 (disoriented to time). Vitals stable, but patient has been hypertensive. Sats 96 - 99% on room air. Patient denies pain, lightheadedness, dizziness. Second infusion of RBCs completed. Hgb 7.2 2 hours post infusion. Patient ambulated to the bathroom x 2 with assist of 1, gait belt, and walker. NPO status continues.    Temp: 98.1  F (36.7  C) Temp src: Oral BP: (!) 148/67 Pulse: 66   Resp: 18 SpO2: 96 % O2 Device: None (Room air)

## 2022-08-05 NOTE — PROGRESS NOTES
"Patient turned on call light and said she felt like she was going to \"throw up\". Patient threw up moderate amounts of undigested food, no blood noted. She was reported that she felt warm and lower legs got tingly when she threw up. Iron infusion stopped, no indication of a reaction her was airway patent, no rash, itching, swollen lips.  After receiving zofran and cool rags to forehead the tingling went away.  Dr Tobias updated and he said to continue with iron infusion.     1315 Writer hooked up rest of IV Ferric Gluconate infusion and patient starting to get nauseated again within two minutes of infusion. Infusion stopped. Dr Tobias updated.   1330 Patient assisted back to bed with assist of two from [facility] following a very large brown stool. Very weak, diaphoretic. Hardly talking. Having abdominal pain. Dr Tobias requested to come to room ASAP.  Bolus ordered 500 ml LR over half and hour.BP 89/59. HR 89. 02 sat 96% RA, airway patent, no hives/rash or pruritis.     "

## 2022-08-05 NOTE — PROGRESS NOTES
Patient requiring new IV as right AC site symptomatic. Patient is requesting to eat and go home. Adamant that she does not want and endoscopy. IV fluids stopped. Dr Tobias web paged.

## 2022-08-05 NOTE — CONSULTS
Care Management Initial Consult    General Information  Assessment completed with: Patient, Children,    Type of CM/SW Visit: Initial Assessment    Primary Care Provider verified and updated as needed: Yes   Readmission within the last 30 days: no previous admission in last 30 days      Reason for Consult: discharge planning  Advance Care Planning: Advance Care Planning Reviewed: present on chart        Communication Assessment  Patient's communication style: spoken language (English or Bilingual)    Hearing Difficulty or Deaf: no   Wear Glasses or Blind: yes    Cognitive  Cognitive/Neuro/Behavioral: .WDL except, orientation  Level of Consciousness: alert  Arousal Level: opens eyes spontaneously  Orientation: disoriented to, time     Best Language: 0 - No aphasia  Speech: fluent, clear, spontaneous    Living Environment:   People in home: alone     Current living Arrangements: condominium      Able to return to prior arrangements: yes     Family/Social Support:  Care provided by: self  Provides care for: no one  Marital Status:   Children          Description of Support System: Supportive, Involved    Support Assessment: Adequate family and caregiver support, Adequate social supports    Current Resources:   Patient receiving home care services: Yes  Skilled Home Care Services: Physicial Therapy  Community Resources: None  Equipment currently used at home: walker, rolling, grab bar, tub/shower  Supplies currently used at home: None    Employment/Financial:  Employment Status: retired        Financial Concerns: No concerns identified      Lifestyle & Psychosocial Needs:  Social Determinants of Health     Tobacco Use: Low Risk      Smoking Tobacco Use: Never Smoker     Smokeless Tobacco Use: Never Used   Alcohol Use: Not on file   Financial Resource Strain: Not on file   Food Insecurity: Not on file   Transportation Needs: Not on file   Physical Activity: Not on file   Stress: Not on file   Social Connections:  Not on file   Intimate Partner Violence: Not on file   Depression: Not at risk     PHQ-2 Score: 0   Housing Stability: Not on file       Functional Status:  Prior to admission patient needed assistance:   Dependent ADLs:: Ambulation-walker  Dependent IADLs:: Shopping, Meal Preparation, Transportation     Mental Health Status:  Mental Health Status: No Current Concerns       Chemical Dependency Status:  Chemical Dependency Status: No Current Concerns           Values/Beliefs:  Spiritual, Cultural Beliefs, Jainism Practices, Values that affect care: no             Additional Information:    CM met with patient and daughterHannah at bedside and introduced self and role. Patient lives alone in a a condo in an independent senior living community. At baseline, patient is independent with ADLs. She uses a walker to assist with ambulation. Patient no longer drives.     Daughter and granddaughter check in on patient almost daily and assist with IADLs.    Patient believes she is current with [facility] (Phone: [phone]) for PT services however she feels that she no longer needs these services. Writer left a message for Radha at [facility] to confirm current services.       Family will transport at discharge.     PLAN: Return home with family support    AIDEN ESCOTO RN

## 2022-08-06 LAB
ANION GAP SERPL CALCULATED.3IONS-SCNC: 8 MMOL/L (ref 3–14)
BUN SERPL-MCNC: 29 MG/DL (ref 7–30)
CALCIUM SERPL-MCNC: 7.8 MG/DL (ref 8.5–10.1)
CHLORIDE BLD-SCNC: 109 MMOL/L (ref 94–109)
CO2 SERPL-SCNC: 22 MMOL/L (ref 20–32)
CREAT SERPL-MCNC: 1.05 MG/DL (ref 0.52–1.04)
ERYTHROCYTE [DISTWIDTH] IN BLOOD BY AUTOMATED COUNT: 18.4 % (ref 10–15)
GFR SERPL CREATININE-BSD FRML MDRD: 51 ML/MIN/1.73M2
GLUCOSE BLD-MCNC: 98 MG/DL (ref 70–99)
HCT VFR BLD AUTO: 27.1 % (ref 35–47)
HGB BLD-MCNC: 8.1 G/DL (ref 11.7–15.7)
MCH RBC QN AUTO: 23.4 PG (ref 26.5–33)
MCHC RBC AUTO-ENTMCNC: 29.9 G/DL (ref 31.5–36.5)
MCV RBC AUTO: 78 FL (ref 78–100)
PLATELET # BLD AUTO: 297 10E3/UL (ref 150–450)
POTASSIUM BLD-SCNC: 4.4 MMOL/L (ref 3.4–5.3)
RBC # BLD AUTO: 3.46 10E6/UL (ref 3.8–5.2)
SODIUM SERPL-SCNC: 139 MMOL/L (ref 133–144)
WBC # BLD AUTO: 17.6 10E3/UL (ref 4–11)

## 2022-08-06 PROCEDURE — 99232 SBSQ HOSP IP/OBS MODERATE 35: CPT | Performed by: INTERNAL MEDICINE

## 2022-08-06 PROCEDURE — 120N000001 HC R&B MED SURG/OB

## 2022-08-06 PROCEDURE — 36415 COLL VENOUS BLD VENIPUNCTURE: CPT | Performed by: INTERNAL MEDICINE

## 2022-08-06 PROCEDURE — C9113 INJ PANTOPRAZOLE SODIUM, VIA: HCPCS | Performed by: PHYSICIAN ASSISTANT

## 2022-08-06 PROCEDURE — 250N000013 HC RX MED GY IP 250 OP 250 PS 637: Performed by: PHYSICIAN ASSISTANT

## 2022-08-06 PROCEDURE — 250N000011 HC RX IP 250 OP 636: Performed by: PHYSICIAN ASSISTANT

## 2022-08-06 PROCEDURE — 80048 BASIC METABOLIC PNL TOTAL CA: CPT | Performed by: INTERNAL MEDICINE

## 2022-08-06 PROCEDURE — 85014 HEMATOCRIT: CPT | Performed by: INTERNAL MEDICINE

## 2022-08-06 PROCEDURE — 258N000003 HC RX IP 258 OP 636: Performed by: INTERNAL MEDICINE

## 2022-08-06 RX ORDER — PANTOPRAZOLE SODIUM 40 MG/1
40 TABLET, DELAYED RELEASE ORAL
Status: DISCONTINUED | OUTPATIENT
Start: 2022-08-07 | End: 2022-08-08 | Stop reason: HOSPADM

## 2022-08-06 RX ADMIN — PANTOPRAZOLE SODIUM 40 MG: 40 INJECTION, POWDER, FOR SOLUTION INTRAVENOUS at 19:01

## 2022-08-06 RX ADMIN — LISINOPRIL 10 MG: 10 TABLET ORAL at 07:57

## 2022-08-06 RX ADMIN — METOPROLOL TARTRATE 25 MG: 25 TABLET, FILM COATED ORAL at 20:07

## 2022-08-06 RX ADMIN — SODIUM CHLORIDE, POTASSIUM CHLORIDE, SODIUM LACTATE AND CALCIUM CHLORIDE: 600; 310; 30; 20 INJECTION, SOLUTION INTRAVENOUS at 12:43

## 2022-08-06 RX ADMIN — METOPROLOL TARTRATE 25 MG: 25 TABLET, FILM COATED ORAL at 07:57

## 2022-08-06 RX ADMIN — SERTRALINE HYDROCHLORIDE 100 MG: 100 TABLET ORAL at 07:57

## 2022-08-06 RX ADMIN — PANTOPRAZOLE SODIUM 40 MG: 40 INJECTION, POWDER, FOR SOLUTION INTRAVENOUS at 05:51

## 2022-08-06 RX ADMIN — SODIUM CHLORIDE, POTASSIUM CHLORIDE, SODIUM LACTATE AND CALCIUM CHLORIDE: 600; 310; 30; 20 INJECTION, SOLUTION INTRAVENOUS at 04:55

## 2022-08-06 ASSESSMENT — ACTIVITIES OF DAILY LIVING (ADL)
ADLS_ACUITY_SCORE: 29
ADLS_ACUITY_SCORE: 28
ADLS_ACUITY_SCORE: 29
ADLS_ACUITY_SCORE: 29
ADLS_ACUITY_SCORE: 28
ADLS_ACUITY_SCORE: 28
ADLS_ACUITY_SCORE: 29
ADLS_ACUITY_SCORE: 29
ADLS_ACUITY_SCORE: 28

## 2022-08-06 NOTE — UTILIZATION REVIEW
Admission Status; Secondary Review Determination       Under the authority of the Utilization Management Committee, the utilization review process indicated a secondary review on the above patient. The review outcome is based on review of the medical records, discussions with staff, and applying clinical experience noted on the date of the review.     (x) Inpatient Status Appropriate - This patient's medical care is consistent with medical management for inpatient care and reasonable inpatient medical practice.     RATIONALE FOR DETERMINATION   Please refer to previous review  Patient requires inpatient admission versus short stay observation or outpatient treatment for the following reasons: Patient became hypotensive in the afternoon, rapid response called, white count significantly increased to 23,500 with increased lactate, CT of the abdomen shows evidence of colitis, there was concern for anaphylactic reaction, she was advanced appropriately to inpatient.  The expected length of stay at the time of admission was more than 2 nights because of the severity of illness, intensity of service provided, and risk for adverse outcome. Inpatient admission is appropriate.         This document was produced using voice recognition software       The information on this document is developed by the utilization review team in order for the business office to ensure compliance. This only denotes the appropriateness of proper admission status and does not reflect the quality of care rendered.   The definitions of Inpatient Status and Observation Status used in making the determination above are those provided in the CMS Coverage Manual, Chapter 1 and Chapter 6, section 70.4.   Sincerely,   ORLANDO SUE MD   System Medical Director   Utilization Management   [facility].

## 2022-08-06 NOTE — PROGRESS NOTES
[facility] Medicine Progress Note  Date of Service: 08/06/2022    Assessment & Plan        Remedios Vincent is a 88 year old female admitted on 8/4/2022. She has history of stroke, hyperlipidemia, CKD, hypertension and hypothyroidism.  She presents due to anemia found on outpatient labs.    Acute on chronic anemia  Iron deficiency  GI bleed    Hgb 5.2 on presentation. Last 9.3 on 4/21/22. During hospitalization 3/2022, trended down to 6.9. Signs of GI bleed at that time, refused endoscopy secondary to age. Hgb was normal prior to starting DAPT due to CVA in 1/2022. Iron studies previously consistent with iron deficiency, and patient treated with 1 dose IV iron in March but has not been on oral iron since then.  DAPT was continued until this admission. No history of visualizing blood in stool, melena, hematemesis or coffee-ground emesis.    Acute or subacute on chronic anemia, likely related to GI bleeding associated with DAPT therapy. Suspect either a chronic/slow bleed versus intermittent. In ED, stool appeared brown though was hemoccult positive. Receiving 2 units of pRBC.  She apparently again refused scopes in the emergency department though on admission she was agreeable to an upper endoscopy to further evaluate.    In the morning 8/5, hgb 7.2 after transfusion 2 units. Patient now adamantly against endoscopy. Follow up hgb six hours later stable 7.5. Ferritin 10, B12 normal. Unknown if patient will absorb iron very well, so ordered IV ferric gluconate 250 mg x 1. Most of iron was in when patient had anaphylaxis (see below). Hgb afterward was hemo-concentrated 12.2 -> 9.8 -> 8.1. Expect hgb will be around 7.5 when mobilizing fluids normally.    - no endoscopy planned now or on discharge per patient choice   - recheck in the morning   - transfuse if < 7   - oral iron on discharge     Ischemic Stroke 1/2022  Hyperlipidemia    Started on aspirin and clopidogrel for this,  "recommended DAPT x 3 months and then aspirin 325 mg monotherapy. It appears she has continued on DAPT.    - holding aspirin and clopidogrel on admission   - stop clopidogrel on discharge as has had > 3 months DAPT   - consider risks and benefits of long-term aspirin therapy given likely chronic GI bleed and stroke risk   - not currently on statin, defer to PCP    CKD    On admission, creatinine 1.05, GFR 51. Recent baseline creatinine 0.88-1.1 and GFR 46-63. Stable.  - follow BMP  - avoid nephrotoxic agents as able    Hypertension  Chronic. Blood pressures reviewed, stable.   - continue home lisinopril, metoprolol with parameters    Hypothyroidism  Chronic.   - continue home levothyroxine    Diet: Clear Liquid Diet    DVT Prophylaxis: Low Risk/Ambulatory with no VTE prophylaxis indicated  Bhatt Catheter: Not present  Central Lines: None  Code Status: No CPR- Do NOT Intubate       Discussion: Improving, still weak, still poor oral intake    Disposition: Anticipate discharge possibly tomorrow if continues to improve     Attestation:  Total time: 30 minutes    George Tobias MD       Interval History   Feels better, \"more like myself\" but no appetite and not out of bed except BR. No chest pain, shortness of breath. No lightheadedness. Loose stools slowing down. Abdominal pain just mild now.     Physical Exam   Temp:  [97.7  F (36.5  C)-98.9  F (37.2  C)] 97.7  F (36.5  C)  Pulse:  [41-81] 77  Resp:  [16-20] 18  BP: ()/(35-87) 132/60  Cuff Mean (mmHg):  [128] 128  SpO2:  [82 %-98 %] 92 %    Weights:   Vitals:    08/04/22 1734 08/04/22 2129 08/05/22 0646   Weight: 68 kg (150 lb) 62.6 kg (138 lb 0.1 oz) 62.9 kg (138 lb 10.7 oz)    Body mass index is 23.8 kg/m .    Constitutional: alert and oriented, NAD, voice stronger, appears weak, nontoxic  CV: Regular, no edema, JVP not elevated  Respiratory: CTA bilaterally  GI: Soft, mild left lower quadrant tenderness, bowel sounds normal  Skin: Warm and dry    Data   Recent " Labs   Lab 08/06/22  0555 08/05/22 2001 08/05/22  1539 08/05/22  1104 08/05/22 0302 08/04/22 1807   WBC 17.6* 20.6* 23.5*  --  5.3  --    HGB 8.1* 9.8* 12.2   < > 7.2*  7.2*  --    MCV 78 78 79  --  79  --     279 429  --  237  --    INR  --   --   --   --   --  1.02     --  139  --  142 140   POTASSIUM 4.4  --  4.7  --  4.6 4.8   CHLORIDE 109  --  108  --  111* 108   CO2 22  --  19*  --  25 25   BUN 29  --  25  --  24 25   CR 1.05*  --  1.02  --  1.04 1.05*   ANIONGAP 8  --  12  --  6 7   LUBNA 7.8*  --  8.5  --  7.8* 8.2*   GLC 98  --  167*  --  88 103*   ALBUMIN  --   --   --   --   --  3.2*   PROTTOTAL  --   --   --   --   --  6.7*   BILITOTAL  --   --   --   --   --  0.1*   ALKPHOS  --   --   --   --   --  69   ALT  --   --   --   --   --  15   AST  --   --   --   --   --  14    < > = values in this interval not displayed.       Recent Labs   Lab 08/06/22 0555 08/05/22  1539 08/05/22 0302 08/04/22  1807 08/04/22  1531   GLC 98 167* 88 103* 101*        Unresulted Labs Ordered in the Past 30 Days of this Admission     No orders found from 7/5/2022 to 8/5/2022.           Imaging  Recent Results (from the past 24 hour(s))   CT Abd/Pelvis w Contrast*    Narrative    EXAM: CT ABDOMEN PELVIS W CONTRAST  LOCATION: [facility]  DATE/TIME: 8/5/2022 4:58 PM    INDICATION: Acute lower abdominal pain and tenderness associated with hypotension  COMPARISON: 3/29/2022  TECHNIQUE: CT scan of the abdomen and pelvis was performed following injection of IV contrast. Multiplanar reformats were obtained. Dose reduction techniques were used.  CONTRAST: 62 mL Isovue 370    FINDINGS:   LOWER CHEST: New small bilateral pleural effusions and minimal atelectasis at lung bases. Moderately large hiatal hernia unchanged.    HEPATOBILIARY: Tiny hepatic cysts unchanged. Trace volume ascites present adjacent to liver.    PANCREAS: No significant change in the lobulated appearing cyst within pancreatic  head, maximal dimension 1.8 x 1.2 cm. No acute inflammatory changes.    SPLEEN: Small splenic cysts. Tiny ascites.    ADRENAL GLANDS: Normal.    KIDNEYS/BLADDER: No change in small cortical and parapelvic left renal cysts, no further workup indicated.    BOWEL: There is inflammatory bowel wall thickening involving most of the left hemicolon consistent with colitis. Extensive diverticulosis of sigmoid colon minimally changed. Right hemicolon is fluid-filled but otherwise unremarkable. No obstruction.    LYMPH NODES: Normal.    VASCULATURE: Unremarkable.    PELVIC ORGANS: Hysterectomy. No pelvic mass.    MUSCULOSKELETAL: Scoliosis and degenerative changes of lumbar spine.      Impression    IMPRESSION:   1.  Circumferential wall thickening involving much of left hemicolon most consistent with segmental colitis.  2.  Extensive sigmoid diverticulosis unchanged.  3.  Increase in tiny volume of ascites. New tiny bilateral pleural effusions.  4.  Cystic focus within head of pancreas again identified and minimally changed. Would recommend a nonemergent follow-up MRI for best evaluation.        I reviewed all new labs and imaging results over the last 24 hours. I personally reviewed no images or EKG's today.    Medications     lactated ringers 125 mL/hr at 08/06/22 1243       lactated ringers  500 mL Intravenous Once     lisinopril  10 mg Oral Daily     metoprolol tartrate  25 mg Oral BID     pantoprazole (PROTONIX) IV  40 mg Intravenous Q12H     sertraline  100 mg Oral Daily     sodium chloride (PF)  3 mL Intracatheter Q8H   Reviewed giles Tobias MD

## 2022-08-06 NOTE — PROGRESS NOTES
"/60 (BP Location: Left arm)   Pulse 77   Temp 97.7  F (36.5  C) (Oral)   Resp 18   Ht 1.626 m (5' 4\")   Wt 62.9 kg (138 lb 10.7 oz)   SpO2 92%   BMI 23.80 kg/m    Patient denies abdominal pain. Drank a cup of juice this morning and water. She has been up to [facility] to void/stool with assist of one. Appears weak and reports that she just doesn't have the energy to get up and sit in chair. Self positions in the bed.  Stool is liquid and does have spots of blood in it.   IV fluids continue at 125 ml per hour, on auscultation of lung sounds noted to have bibasilar fine crackles.     "

## 2022-08-07 DIAGNOSIS — I63.9 ISCHEMIC STROKE (H): ICD-10-CM

## 2022-08-07 LAB
ANION GAP SERPL CALCULATED.3IONS-SCNC: 8 MMOL/L (ref 3–14)
BUN SERPL-MCNC: 20 MG/DL (ref 7–30)
CALCIUM SERPL-MCNC: 7.6 MG/DL (ref 8.5–10.1)
CHLORIDE BLD-SCNC: 110 MMOL/L (ref 94–109)
CO2 SERPL-SCNC: 22 MMOL/L (ref 20–32)
CREAT SERPL-MCNC: 0.96 MG/DL (ref 0.52–1.04)
ERYTHROCYTE [DISTWIDTH] IN BLOOD BY AUTOMATED COUNT: 18.7 % (ref 10–15)
GFR SERPL CREATININE-BSD FRML MDRD: 57 ML/MIN/1.73M2
GLUCOSE BLD-MCNC: 72 MG/DL (ref 70–99)
HCT VFR BLD AUTO: 26.3 % (ref 35–47)
HGB BLD-MCNC: 7.9 G/DL (ref 11.7–15.7)
MCH RBC QN AUTO: 23.6 PG (ref 26.5–33)
MCHC RBC AUTO-ENTMCNC: 30 G/DL (ref 31.5–36.5)
MCV RBC AUTO: 79 FL (ref 78–100)
PLATELET # BLD AUTO: 235 10E3/UL (ref 150–450)
POTASSIUM BLD-SCNC: 4.2 MMOL/L (ref 3.4–5.3)
RBC # BLD AUTO: 3.35 10E6/UL (ref 3.8–5.2)
SODIUM SERPL-SCNC: 140 MMOL/L (ref 133–144)
WBC # BLD AUTO: 12.3 10E3/UL (ref 4–11)

## 2022-08-07 PROCEDURE — 120N000001 HC R&B MED SURG/OB

## 2022-08-07 PROCEDURE — 258N000003 HC RX IP 258 OP 636: Performed by: INTERNAL MEDICINE

## 2022-08-07 PROCEDURE — 250N000011 HC RX IP 250 OP 636: Performed by: INTERNAL MEDICINE

## 2022-08-07 PROCEDURE — 250N000013 HC RX MED GY IP 250 OP 250 PS 637: Performed by: INTERNAL MEDICINE

## 2022-08-07 PROCEDURE — 80048 BASIC METABOLIC PNL TOTAL CA: CPT | Performed by: INTERNAL MEDICINE

## 2022-08-07 PROCEDURE — 250N000013 HC RX MED GY IP 250 OP 250 PS 637: Performed by: PHYSICIAN ASSISTANT

## 2022-08-07 PROCEDURE — 36415 COLL VENOUS BLD VENIPUNCTURE: CPT | Performed by: INTERNAL MEDICINE

## 2022-08-07 PROCEDURE — 85027 COMPLETE CBC AUTOMATED: CPT | Performed by: INTERNAL MEDICINE

## 2022-08-07 RX ORDER — FUROSEMIDE 10 MG/ML
20 INJECTION INTRAMUSCULAR; INTRAVENOUS ONCE
Status: COMPLETED | OUTPATIENT
Start: 2022-08-07 | End: 2022-08-07

## 2022-08-07 RX ORDER — METOPROLOL TARTRATE 25 MG/1
TABLET, FILM COATED ORAL
Qty: 180 TABLET | Refills: 1 | Status: SHIPPED | OUTPATIENT
Start: 2022-08-07 | End: 2023-02-07

## 2022-08-07 RX ORDER — CLOPIDOGREL BISULFATE 75 MG/1
TABLET ORAL
Qty: 90 TABLET | Refills: 1 | OUTPATIENT
Start: 2022-08-07 | End: 2022-08-08

## 2022-08-07 RX ADMIN — METOPROLOL TARTRATE 25 MG: 25 TABLET, FILM COATED ORAL at 08:19

## 2022-08-07 RX ADMIN — SERTRALINE HYDROCHLORIDE 100 MG: 100 TABLET ORAL at 08:19

## 2022-08-07 RX ADMIN — PANTOPRAZOLE SODIUM 40 MG: 40 TABLET, DELAYED RELEASE ORAL at 06:16

## 2022-08-07 RX ADMIN — METOPROLOL TARTRATE 25 MG: 25 TABLET, FILM COATED ORAL at 20:20

## 2022-08-07 RX ADMIN — LISINOPRIL 10 MG: 10 TABLET ORAL at 08:19

## 2022-08-07 RX ADMIN — SODIUM CHLORIDE, POTASSIUM CHLORIDE, SODIUM LACTATE AND CALCIUM CHLORIDE: 600; 310; 30; 20 INJECTION, SOLUTION INTRAVENOUS at 00:39

## 2022-08-07 RX ADMIN — FUROSEMIDE 20 MG: 10 INJECTION, SOLUTION INTRAMUSCULAR; INTRAVENOUS at 14:32

## 2022-08-07 ASSESSMENT — ACTIVITIES OF DAILY LIVING (ADL)
ADLS_ACUITY_SCORE: 26
ADLS_ACUITY_SCORE: 36
ADLS_ACUITY_SCORE: 26
ADLS_ACUITY_SCORE: 26
ADLS_ACUITY_SCORE: 28
ADLS_ACUITY_SCORE: 28
ADLS_ACUITY_SCORE: 26
ADLS_ACUITY_SCORE: 28
ADLS_ACUITY_SCORE: 36

## 2022-08-07 NOTE — PROGRESS NOTES
[facility] Medicine Progress Note  Date of Service: 08/07/2022    Assessment & Plan        Remedios Vincent is a 88 year old female admitted on 8/4/2022. She has history of stroke, hyperlipidemia, CKD, hypertension and hypothyroidism.  She presents due to anemia found on outpatient labs.    Acute on chronic anemia  Iron deficiency  GI bleed    Hgb 5.2 on presentation. Last 9.3 on 4/21/22. During hospitalization 3/2022, trended down to 6.9. Signs of GI bleed at that time, refused endoscopy secondary to age. Hgb was normal prior to starting DAPT due to CVA in 1/2022. Iron studies previously consistent with iron deficiency, and patient treated with 1 dose IV iron in March but has not been on oral iron since then.  DAPT was continued until this admission. No history of visualizing blood in stool, melena, hematemesis or coffee-ground emesis.    Acute or subacute on chronic anemia, likely related to GI bleeding associated with DAPT therapy. Suspect either a chronic/slow bleed versus intermittent. In ED, stool appeared brown though was hemoccult positive. Receiving 2 units of pRBC.  She apparently again refused scopes in the emergency department though on admission she was agreeable to an upper endoscopy to further evaluate.    In the morning 8/5, hgb 7.2 after transfusion 2 units. Patient now adamantly against endoscopy. Follow up hgb six hours later stable 7.5. Ferritin 10, B12 normal. Unknown if patient will absorb iron very well, so ordered IV ferric gluconate 250 mg x 1. Most of iron was in when patient had anaphylaxis (see below). Hgb afterward was hemo-concentrated 12.2 -> 9.8 -> 8.1.     On discharge, hemoglobin 7.9.     - no endoscopy planned now or on discharge per patient choice   - oral iron on discharge, follow up with PCP    Ischemic stroke, left putamen, due to CVD 1/2022  Hyperlipidemia    Started on aspirin and clopidogrel, recommended DAPT x 3 months and then aspirin  325 mg monotherapy. It appears she has continued on DAPT.    - holding aspirin and clopidogrel on admission   - stop clopidogrel on discharge as has had > 3 months DAPT   - consider risks and benefits of long-term aspirin therapy given likely chronic GI bleed and stroke risk   - not currently on statin, defer to PCP    CKD    On admission, creatinine 1.05, GFR 51. Recent baseline creatinine 0.88-1.1 and GFR 46-63. Stable.  - follow BMP  - avoid nephrotoxic agents as able    Hypertension  Chronic. Blood pressures reviewed, stable.   - continue home lisinopril, metoprolol with parameters    Hypothyroidism  Chronic.   - continue home levothyroxine    Diet: Advance Diet as Tolerated: Regular Diet Adult; Regular Diet Adult    DVT Prophylaxis: Low Risk/Ambulatory with no VTE prophylaxis indicated  Bhatt Catheter: Not present  Central Lines: None  Code Status: No CPR- Do NOT Intubate       Discussion: Improved. Suspect mobilizing fluids and may have some heart failure due to volume overload following anasarca. Furosemide 20 mg IV x 1 and expect can discharge after dinner today. Patient agreeable.     Disposition Plan   Likely discharge after dinner to home with family    Attestation:  Total time: 20 minutes this visit    George Tobias MD  Hospital Medicine        Interval History   Feels better, no abdominal pain, no chest pain. Denies shortness of breath though appears dyspneic at rest. Appetite improving. Hopes to go home.     Physical Exam   Temp:  [97.7  F (36.5  C)-98.4  F (36.9  C)] 98  F (36.7  C)  Pulse:  [68-77] 71  Resp:  [8-19] 16  BP: (137-173)/(57-79) 173/79  SpO2:  [91 %-97 %] 91 %    Weights:   Vitals:    08/04/22 1734 08/04/22 2129 08/05/22 0646   Weight: 68 kg (150 lb) 62.6 kg (138 lb 0.1 oz) 62.9 kg (138 lb 10.7 oz)    Body mass index is 23.8 kg/m .    Constitutional: alert and oriented, nontoxic, appears dyspneic otherwise NAD  CV: Regular, there is JVD, trace bilateral lower extremity edema    Respiratory: crackles bilateral bases 1/3 up partially improved with deep breaths but still scattered crackles present  GI: Soft, non-tender, bowel sounds normal  Skin: Warm and dry    Data   Recent Labs   Lab 08/07/22  0416 08/06/22  0555 08/05/22 2001 08/05/22  1539 08/05/22  0302 08/04/22  1807   WBC 12.3* 17.6* 20.6* 23.5*   < >  --    HGB 7.9* 8.1* 9.8* 12.2   < >  --    MCV 79 78 78 79   < >  --     297 279 429   < >  --    INR  --   --   --   --   --  1.02    139  --  139   < > 140   POTASSIUM 4.2 4.4  --  4.7   < > 4.8   CHLORIDE 110* 109  --  108   < > 108   CO2 22 22  --  19*   < > 25   BUN 20 29  --  25   < > 25   CR 0.96 1.05*  --  1.02   < > 1.05*   ANIONGAP 8 8  --  12   < > 7   LUBNA 7.6* 7.8*  --  8.5   < > 8.2*   GLC 72 98  --  167*   < > 103*   ALBUMIN  --   --   --   --   --  3.2*   PROTTOTAL  --   --   --   --   --  6.7*   BILITOTAL  --   --   --   --   --  0.1*   ALKPHOS  --   --   --   --   --  69   ALT  --   --   --   --   --  15   AST  --   --   --   --   --  14    < > = values in this interval not displayed.       Recent Labs   Lab 08/07/22 0416 08/06/22  0555 08/05/22  1539 08/05/22 0302 08/04/22  1807 08/04/22  1531   GLC 72 98 167* 88 103* 101*        Unresulted Labs Ordered in the Past 30 Days of this Admission     No orders found from 7/5/2022 to 8/5/2022.           Imaging: No results found for this or any previous visit (from the past 24 hour(s)).     I reviewed all new labs and imaging results over the last 24 hours. I personally reviewed no images or EKG's today.    Medications       lisinopril  10 mg Oral Daily     metoprolol tartrate  25 mg Oral BID     pantoprazole  40 mg Oral QAM AC     sertraline  100 mg Oral Daily     sodium chloride (PF)  3 mL Intracatheter Q8H       George Tobias MD  [facility] Medicine

## 2022-08-07 NOTE — PROGRESS NOTES
"Patient alert and oriented x 4. Standby assist with gait belt and walker for ambulation, steady gait. Patient uses call light appropriately. Patient stated many times that she really wants to go home and is feeling \"much better\" today.    No pain reported this shift. Clear liquid diet tolerated okay. No BM's this shift, patient passing a lot of flatus. Small amount of mucus noted in brief this evening.     IV fluids administered per MAR.     /59   Pulse 68   Temp 98.4  F (36.9  C) (Oral)   Resp 19   Ht 1.626 m (5' 4\")   Wt 62.9 kg (138 lb 10.7 oz)   SpO2 95%   BMI 23.80 kg/m      Zakiya Thomas RN on 8/6/2022 at 11:42 PM        "

## 2022-08-07 NOTE — PROGRESS NOTES
Patient is alert, oriented.  Denies pain.  Moving well with stand by assist and walker.  Reports feeling stronger. Hoping to discharge this evening.

## 2022-08-07 NOTE — TELEPHONE ENCOUNTER
"Routing refill request to provider for review/approval because:  bp out of range    Last Written Prescription Date:  2/9/22  Last Fill Quantity: 90,  # refills: 1   Last office visit provider:  4/4/22     Requested Prescriptions   Pending Prescriptions Disp Refills     metoprolol tartrate (LOPRESSOR) 25 MG tablet [Pharmacy Med Name: METOPROLOL TARTRATE 25 MG TAB] 180 tablet 1     Sig: TAKE 1 TABLET BY MOUTH TWICE A DAY       Beta-Blockers Protocol Failed - 8/7/2022  7:26 AM        Failed - Blood pressure under 140/90 in past 12 months     BP Readings from Last 3 Encounters:   08/07/22 (!) 173/79   04/04/22 (!) 151/65   03/31/22 (!) 150/71                 Passed - Patient is age 6 or older        Passed - Recent (12 mo) or future (30 days) visit within the authorizing provider's specialty     Patient has had an office visit with the authorizing provider or a provider within the authorizing providers department within the previous 12 mos or has a future within next 30 days. See \"Patient Info\" tab in inSIVIet, or \"Choose Columns\" in Meds & Orders section of the refill encounter.              Passed - Medication is active on med list           clopidogrel (PLAVIX) 75 MG tablet [Pharmacy Med Name: CLOPIDOGREL 75 MG TABLET] 90 tablet 1     Sig: TAKE 1 TABLET BY MOUTH EVERY DAY       Plavix Failed - 8/7/2022  7:26 AM        Failed - Normal HGB on file in past 12 months     Recent Labs   Lab Test 08/07/22  0416   HGB 7.9*               Passed - No active PPI on record unless is Protonix        Passed - Normal Platelets on file in past 12 months     Recent Labs   Lab Test 08/07/22  0416                  Passed - Recent (12 mo) or future (30 days) visit within the authorizing provider's specialty     Patient has had an office visit with the authorizing provider or a provider within the authorizing providers department within the previous 12 mos or has a future within next 30 days. See \"Patient Info\" tab in inbasket, or " "\"Choose Columns\" in Meds & Orders section of the refill encounter.              Passed - Medication is active on med list        Passed - Patient is age 18 or older        Passed - No active pregnancy on record        Passed - No positive pregnancy test in past 12 months             Kita Mitchell RN 08/07/22 11:11 AM  "

## 2022-08-08 VITALS
WEIGHT: 146.16 LBS | BODY MASS INDEX: 24.95 KG/M2 | HEART RATE: 69 BPM | OXYGEN SATURATION: 92 % | DIASTOLIC BLOOD PRESSURE: 74 MMHG | SYSTOLIC BLOOD PRESSURE: 176 MMHG | TEMPERATURE: 97.5 F | HEIGHT: 64 IN | RESPIRATION RATE: 18 BRPM

## 2022-08-08 PROCEDURE — 250N000013 HC RX MED GY IP 250 OP 250 PS 637: Performed by: PHYSICIAN ASSISTANT

## 2022-08-08 PROCEDURE — 250N000013 HC RX MED GY IP 250 OP 250 PS 637: Performed by: INTERNAL MEDICINE

## 2022-08-08 PROCEDURE — 99239 HOSP IP/OBS DSCHRG MGMT >30: CPT | Performed by: INTERNAL MEDICINE

## 2022-08-08 RX ORDER — FERROUS SULFATE 325(65) MG
325 TABLET ORAL
COMMUNITY
Start: 2022-08-08

## 2022-08-08 RX ADMIN — SERTRALINE HYDROCHLORIDE 100 MG: 100 TABLET ORAL at 08:09

## 2022-08-08 RX ADMIN — METOPROLOL TARTRATE 25 MG: 25 TABLET, FILM COATED ORAL at 08:08

## 2022-08-08 RX ADMIN — PANTOPRAZOLE SODIUM 40 MG: 40 TABLET, DELAYED RELEASE ORAL at 06:22

## 2022-08-08 RX ADMIN — LISINOPRIL 10 MG: 10 TABLET ORAL at 08:08

## 2022-08-08 ASSESSMENT — ACTIVITIES OF DAILY LIVING (ADL)
ADLS_ACUITY_SCORE: 26

## 2022-08-08 NOTE — PROGRESS NOTES
Care Management Discharge Note    Discharge Date: 08/08/2022     Discharge Disposition: Home    Discharge Services: None    Discharge DME: None    Discharge Transportation: family or friend will provide    Private pay costs discussed: Not applicable    Patient/family educated on Medicare website which has current facility and service quality ratings: no    Education Provided on the Discharge Plan: yes   Persons Notified of Discharge Plans: patient, daughter  Patient/Family in Agreement with the Plan: yes    Handoff Referral Completed: Yes    Additional Information:    Per IDT rounds today, MD team states that patient is medically stable for discharge today.     Per Radha, with [facility] (Phone: [phone]), patient is no longer current with home care services due to patient declining.   Patient feels that she does not need home care services.     Patient and daughter are in agreement with discharging home today with family support.     Daughter to transport at discharge.     PLAN: Return home with family support      AIDEN ESCOTO RN

## 2022-08-08 NOTE — PROGRESS NOTES
Update given to daughter, Flower, regarding discharge tomorrow. Daughter will be here around 10 and is able to transport pt.

## 2022-08-08 NOTE — PROGRESS NOTES
Neuro: A&Ox4.  Cardiac: SBP has been 160s-170s  Respiratory: WNL  GI/: Last BM 8/7. Continent. Urinating very frequently q1hr. PVR 80. Pt states she drinks a lot of water.    Mobility: SBA walker.   Diet: Reg    Pain: Denies.   Skin: WNL. Scattered bruisin.  LDA: No PIV, MD aware.     VS: On RA. SBP 160s-170s Afebrile.      Plan: discharge back to [facility]. Daughter will be her in AM to transport/

## 2022-08-08 NOTE — PROGRESS NOTES
BETSY KNAPPG DISCHARGE NOTE    Patient discharged to home at 10:00 AM via wheel chair. Accompanied by daughter and staff. Discharge instructions reviewed with patient and daughter, opportunity offered to ask questions. Prescriptions sent to patients preferred pharmacy. All belongings sent with patient.    Lynn Holcomb RN

## 2022-08-08 NOTE — DISCHARGE SUMMARY
[facility]  Discharge Summary  Hospital Medicine       Date of Admission:  8/4/2022  Date of Discharge:  8/8/2022 10:14 AM  Discharging Provider: George Tobias MD      Identification and Chief Compaint: Remedios Vincent is a 88 year old female who presented on 8/4/2022 with complaint of referred to ED due to anemia found on outpatient labs.    Discharge Diagnoses   Acute on chronic anemia  Iron deficiency  GI bleed, likely chronic  Anaphylaxis to IV iron  Ischemic stroke, left putamen, due to CVD 1/2022  Hyperlipidemia  Hypertension   Chronic kidney disease   Hypothyroidism      Follow-ups Needed After Discharge   Follow-up Appointments     Follow-up and recommended labs and tests       Follow up with primary care provider, Edin Dailey, today as   scheduled.  The following labs/tests are recommended: hemoglobin in a   couple of weeks.           Hospital Course      Acute on chronic anemia  Iron deficiency  GI bleed, likely chronic    Hgb 5.2 on presentation. Last 9.3 on 4/21/22. During hospitalization 3/2022, trended down to 6.9. Signs of GI bleed at that time, refused endoscopy secondary to age. Hgb was normal prior to starting DAPT due to CVA in 1/2022. Iron studies previously consistent with iron deficiency, and patient treated with 1 dose IV iron in March but has not been on oral iron since then.  DAPT was continued until this admission. No history of visualizing blood in stool, melena, hematemesis or coffee-ground emesis.    Acute or subacute on chronic anemia, likely related to GI bleeding associated with DAPT therapy. Suspect either a chronic/slow bleed versus intermittent. In ED, stool appeared brown though was hemoccult positive. Receiving 2 units of pRBC.  She apparently again refused scopes in the emergency department though on admission she was agreeable to an upper endoscopy to further evaluate.    In the morning 8/5, hgb 7.2 after transfusion 2 units. Patient  now adamantly against endoscopy. Follow up hgb six hours later stable 7.5. Ferritin 10, B12 normal. Unknown if patient will absorb iron very well, so ordered IV ferric gluconate 250 mg x 1. Most of iron was in when patient had anaphylaxis (see below). Hgb afterward was hemo-concentrated 12.2 -> 9.8 -> 8.1.     On discharge, hemoglobin 7.9.     - no endoscopy planned now or on discharge per patient choice   - oral iron on discharge, follow up with PCP    Anaphylaxis due to IV iron infusion 8/5/2022    Ferrlicit 250 mg IV x 1 ordered for iron deficiency. Near end of iron infusion, patient had emesis of her lunch.  Short time later she needed to defecate.  While on the commode and after defecating she became very weak and lightheaded.  She developed lower abdominal pain.  She is found to be hypotensive. Patient appeared to be having a vagal reaction with cool and clammy skin, hypotension without tachycardia.  No hives and no evidence of mouth swelling or difficulty breathing.  Lungs were clear.  There was abdominal tenderness bilaterally but especially in the lower quadrants. Fluid bolus initiated but hypotension progressed. Given onset of symptoms at the end of iron infusion, there was concern then that this was a reaction to the iron.  Epinephrine was given IM x 1, and blood pressure improved and patient appeared more stable. Labs were obtained and lactic acid was 2.9.  CBC was notable for white count 23.5 which likely reflected demargination and surprisingly the hemoglobin was 12.2, up from 7.5 just four hours earlier.  Suspect hemoconcentration due to profound third spacing.  CT abdomen was obtained and showed circumferential bowel wall swelling of the descending colon which was interpreted as colitis by the radiologist.  However, symptom onset was too rapid for colitis and likely this represents angioedema related to anaphylaxis.    Patient was given supportive treatment and further IV fluids. She had profuse  diarrhea that gradually cleared and then return of appetite. She appeared to be mobilizing fluid with increased JVP and mild dyspnea on 8/7 and this improved after a dose of furosemide. Echo shows normal LV function and suspect patient had iatrogenic volume overload and no acute heart failure. ( 8/24/2022)     Ischemic stroke, left putamen, due to CVD 1/2022  Hyperlipidemia    Started on aspirin and clopidogrel, recommended DAPT x 3 months and then aspirin 325 mg monotherapy. It appears she has continued on DAPT.     Discussed with daughter on day of discharge. She is unsure if patient has been on the clopidogrel as she thinks it may have been discontinued - she recalls the patient's PCP saying a couple of medications were no longer needed earlier this year. Regardless, if she is still on it, she is instructed to stop.     Need to consider risks and benefits of long-term aspirin therapy given likely chronic GI bleed and stroke risk. Aspirin 325 mg was recommended for stroke prophylaxis, but may be more prudent to use 81 mg which I am leaving on her list. She has an appointment with her PCP today and can discuss with him.      Chronic kidney disease, stage 3a ( 8/24/2022)    On admission, creatinine 1.05, GFR 51. Recent baseline creatinine 0.88-1.1 and GFR 46-63. Stable.  - follow BMP  - avoid nephrotoxic agents as able     Hypertension  Chronic. Blood pressures reviewed, stable.   - continue home lisinopril, metoprolol with parameters     Hypothyroidism  Chronic.   - continue home levothyroxine    Consultations This Hospital Stay   CARE MANAGEMENT / SOCIAL WORK IP CONSULT    Code Status   No CPR- Do NOT Intubate    The discharge plan was discussed with the patient and daughter at bedside, and they expressed understanding.     Time Spent on this Encounter   Total time on this discharge was 40 minutes.       George Tobias MD  [facility]  ______________________________________________________________________    Physical Exam   Vital Signs: Temp: 97.5  F (36.4  C) Temp src: Oral BP: (!) 176/74 Pulse: 69   Resp: 18 SpO2: 92 % O2 Device: None (Room air)    Weight: 146 lbs 2.64 oz  Constitutional: alert and oriented, NAD, nontoxic  CV: Regular, JVP normal, no edema  Respiratory: CTA bilaterally  GI: Soft, non-tender   Skin: Warm and dry       Primary Care Physician   Edin Willettarity  [address]  [facility] [id number]     Discharge Disposition   Discharged to home  Condition at discharge: Stable    Significant Results and Procedures   Results for orders placed or performed during the hospital encounter of 08/04/22   CT Abd/Pelvis w Contrast*    Narrative    EXAM: CT ABDOMEN PELVIS W CONTRAST  LOCATION: [facility]  DATE/TIME: 8/5/2022 4:58 PM    INDICATION: Acute lower abdominal pain and tenderness associated with hypotension  COMPARISON: 3/29/2022  TECHNIQUE: CT scan of the abdomen and pelvis was performed following injection of IV contrast. Multiplanar reformats were obtained. Dose reduction techniques were used.  CONTRAST: 62 mL Isovue 370    FINDINGS:   LOWER CHEST: New small bilateral pleural effusions and minimal atelectasis at lung bases. Moderately large hiatal hernia unchanged.    HEPATOBILIARY: Tiny hepatic cysts unchanged. Trace volume ascites present adjacent to liver.    PANCREAS: No significant change in the lobulated appearing cyst within pancreatic head, maximal dimension 1.8 x 1.2 cm. No acute inflammatory changes.    SPLEEN: Small splenic cysts. Tiny ascites.    ADRENAL GLANDS: Normal.    KIDNEYS/BLADDER: No change in small cortical and parapelvic left renal cysts, no further workup indicated.    BOWEL: There is inflammatory bowel wall thickening involving most of the left hemicolon consistent with colitis. Extensive diverticulosis of sigmoid colon minimally changed. Right hemicolon is  fluid-filled but otherwise unremarkable. No obstruction.    LYMPH NODES: Normal.    VASCULATURE: Unremarkable.    PELVIC ORGANS: Hysterectomy. No pelvic mass.    MUSCULOSKELETAL: Scoliosis and degenerative changes of lumbar spine.      Impression    IMPRESSION:   1.  Circumferential wall thickening involving much of left hemicolon most consistent with segmental colitis.  2.  Extensive sigmoid diverticulosis unchanged.  3.  Increase in tiny volume of ascites. New tiny bilateral pleural effusions.  4.  Cystic focus within head of pancreas again identified and minimally changed. Would recommend a nonemergent follow-up MRI for best evaluation.     Procedures    Transfused 2 units PRBC    Discharge Orders      Reason for your hospital stay    Severe iron-deficiency anemia (low hemoglobin) likely due to chronic slow GI bleeding that was exacerbated by clopidogrel (Plavix) and aspirin. We gave a transfusion to bring up the hemoglobin. We then gave a dose of IV iron, and this triggered an anaphylactic reaction which prolonged the hospital stay. Now doing well. A reaction to IV iron is not an allergy to iron, and oral iron supplementation is safe and necessary to replace iron stores so the anemia can improve. We stopped the clopidogrel as the recommendation from the stroke neurologist last January was to do clopidogrel plus aspirin 81 mg for a few months, and then stop the clopidogrel and take aspirin 325 mg daily thereafter to reduce the risk of another stroke. Given the anemia, it's hard to know whether to continue with aspirin, but I think it is reasonable to continue the aspirin at the lower dose of 81 mg daily and follow the hemoglobin in clinic. If hemoglobin is not improving, then would probably need to stop the aspirin.     Follow-up and recommended labs and tests     Follow up with primary care provider, Edin Dailey, today as scheduled.  The following labs/tests are recommended: hemoglobin in a couple of  weeks.     Activity    Your activity upon discharge: activity as tolerated     Diet    Follow this diet upon discharge: Regular Diet Adult;     Discharge Medications   Current Discharge Medication List      START taking these medications    Details   ferrous sulfate (FEROSUL) 325 (65 Fe) MG tablet Take 1 tablet (325 mg) by mouth daily (with breakfast) With small glass of orange juice to help absorb more of the iron.    Associated Diagnoses: Iron deficiency anemia due to chronic blood loss         CONTINUE these medications which have NOT CHANGED    Details   aspirin (ASA) 81 MG EC tablet Take 1 tablet (81 mg) by mouth daily  Qty: 30 tablet, Refills: 0    Associated Diagnoses: Ischemic stroke (H)      cyanocobalamin (VITAMIN B-12) 1000 MCG SUBL sublingual tablet Place 1 tablet (1,000 mcg) under the tongue daily  Qty: 30 tablet, Refills: 0    Associated Diagnoses: Anemia due to vitamin B12 deficiency, unspecified B12 deficiency type      folic acid (FOLVITE) 1 MG tablet Take 1 tablet (1 mg) by mouth daily  Qty: 30 tablet, Refills: 0    Associated Diagnoses: Anemia due to folic acid deficiency, unspecified deficiency type      lisinopril (ZESTRIL) 10 MG tablet TAKE 1 TABLET BY MOUTH EVERY DAY  Qty: 90 tablet, Refills: 1    Associated Diagnoses: Essential hypertension      melatonin 5 MG tablet Take 5 mg by mouth At Bedtime      pantoprazole (PROTONIX) 40 MG EC tablet Take 1 tablet (40 mg) by mouth every morning (before breakfast)  Qty: 30 tablet, Refills: 0    Associated Diagnoses: Iron deficiency anemia, unspecified iron deficiency anemia type      sertraline (ZOLOFT) 100 MG tablet Take 1 tablet (100 mg) by mouth daily  Qty: 30 tablet, Refills: 0    Associated Diagnoses: Mood disorder (H)      metoprolol tartrate (LOPRESSOR) 25 MG tablet TAKE 1 TABLET BY MOUTH TWICE A DAY  Qty: 180 tablet, Refills: 1    Associated Diagnoses: Ischemic stroke (H)           Allergies   Allergies   Allergen Reactions     Sodium Ferric  Gluconate [Ferrous Gluconate] Anaphylaxis     Seafood Unknown

## 2022-08-09 ENCOUNTER — PATIENT OUTREACH (OUTPATIENT)
Dept: CARE COORDINATION | Facility: CLINIC | Age: 87
End: 2022-08-09

## 2022-08-09 NOTE — PROGRESS NOTES
Connected Care Resource Center Contact  [facility]/Voicemail     Clinical Data: Care Coordinator Outreach - TCM      Outreach attempted x 1.  No answer, unable to leave a message as no voicemail option available.      Plan:  [facility] will try to reach patient again in 1-2 business days.       Steph Parsons RN  [facility], [facility]    *Connected Care Resource Team does NOT follow patient ongoing. Referrals are identified based on internal discharge reports and the outreach is to ensure patient has an understanding of their discharge instructions.

## 2022-08-10 NOTE — PROGRESS NOTES
Clinic Care Coordination Contact  [facility]: Post-Discharge Note  SITUATION                                                      Admission:    Admission Date: 08/04/22   Reason for Admission: GI hemorrhage  Discharge:   Discharge Date: 08/08/22  Discharge Diagnosis: GI hemorrhage    BACKGROUND                                                      Per hospital discharge summary and inpatient provider notes:    Remedios Vincent is a 88 year old female admitted on 8/4/2022. She has history of stroke, hyperlipidemia, CKD, hypertension and hypothyroidism.  She presents due to anemia found on outpatient labs.     Acute on Chronic Anemia  Iron Deficiency  GI Bleed  Hgb 5.2 on presentation. Last 9.3 on 4/21/22. During hospitalization 3/2022, trended down to 6.9. Signs of GI bleed at that time, refused endoscopy secondary to age. Hgb was normal prior to starting DAPT due to CVA in 1/2022. Iron studies previously consistent with iron deficiency.  No history of blood in stool, melena, hematemesis or coffee-ground emesis.  Appears to have acute on chronic anemia, likely related to GI bleeding associated with DAPT therapy started after CVA. Suspect either a chronic/slow bleed versus intermittent. In ED stool appeared brown though was hemoccult positive. Receiving 2 units of pRBC.  She apparently again refused scopes in the emergency department though on admission she is agreeable to an upper endoscopy to further evaluate.  - Tranfusing 2 pRBC  - recheck hemoglobin 2 hours following and then Q8 hours  - IV pantoprazole 40 mg Q12 hours  - discussed endoscopy, patient now agreeable to upper endoscopy, refuses colonoscopy. Will need to contact surgery tomorrow  - diet: Clear liquid diet, n.p.o. at midnight     Ischemic Stroke 1/2022  Hyperlipidemia  Started on aspirin and clopidogrel for this, recommended DAPT x 3 moth and then aspirin 325 mg monotherapy. It appears she has continued on DAPT therapy.   - holding aspirin and  "clopidogrel, if able to safely resume would stop clopidogrel and start aspirin 325 mg (as tolerated)  - not currently on statin, defer to PCP     CKD  On admission, creatinine 1.05, GFR 51. Recent baseline creatinine 0.88-1.1 and GFR 46-63. Stable.  - follow BMP  - avoid nephrotoxic agents as able     Hypertension  Chronic. Blood pressures reviewed, stable.   - continue home lisinopril, metoprolol with parameters     Hypothyroidism  Chronic.   - continue home levothyroxine    ASSESSMENT      Enrollment  Primary Care Care Coordination Status: Declined    Discharge Assessment  How are you doing now that you are home?: Pt states \"I'm doing great, I feel good and so happy to be home.\"  Pt denies any melanotic or bloody stools.  States she thinks she's had a BM but doesn't recall the date.  How are your symptoms? (Red Flag symptoms escalate to triage hotline per guidelines): Improved  Do you feel your condition is stable enough to be safe at home until your provider visit?: Yes  Does the patient have their discharge instructions? : Yes  Does the patient have questions regarding their discharge instructions? : No  Were you started on any new medications or were there changes to any of your previous medications? : Yes  Does the patient have all of their medications?: Yes (Pt states her daughter sets up her medicaitons so she's not sure what all she's taking, but does recall hearing about medication changes and adding iron)  Do you have questions regarding any of your medications? : No  Discharge follow-up appointment scheduled within 14 calendar days? : No  Is patient agreeable to assistance with scheduling? : No (Pt states she has to talk to her daughter as she brings her to her appts and they have to work around her work schedule, and will call clinic to schedule hospital follow up appt.)         Post-op (Clinicians Only)  Did the patient have surgery or a procedure: No      PLAN                                           "            Outpatient Plan:      Follow up with primary care provider, Edin Dailey, today as scheduled. The following labs/tests are recommended: hemoglobin in a couple of weeks.    No future appointments.      For any urgent concerns, please contact our 24 hour nurse triage line: [phone] ([phone])         Steph Parsons RN

## 2022-08-27 ENCOUNTER — HEALTH MAINTENANCE LETTER (OUTPATIENT)
Age: 87
End: 2022-08-27

## 2023-01-06 NOTE — PLAN OF CARE
"          Admit Date: 8/4/2022  Admitting Diagnosis: severe anemia, GI bleed  Neuro: Alert and Oriented x4  Activity: are 2 assist with Gait Belt and Walker  BP (!) 162/64 (BP Location: Right arm)   Pulse 72   Temp 97.8  F (36.6  C) (Oral)   Resp 18   Ht 1.626 m (5' 4\")   Wt 62.9 kg (138 lb 10.7 oz)   SpO2 93%   BMI 23.80 kg/m    Telemetry Monitoring: No  Pain: denying pain.  Labs / Tests: no tests ordered for am  GI: BSC  : BSC  Fluids: has Lactated Ringer's running at 75 mL per hour. Patient is drinking and voiding without symptoms.   Diet: Regular  Consults: CM  Treatment: monitor and treat low blood count, hydrate, follow md plan  Discharge Disposition: TBD but patient states that she is hoping to go home today.                          "
"Admit Date: 8/4/2022  Admitting Diagnosis: severe anemia  Neuro: Alert and Oriented x4  Activity: are 2 assist with Gait Belt and Walker  /62 (BP Location: Left arm)   Pulse 81   Temp 97.8  F (36.6  C) (Oral)   Resp 18   Ht 1.626 m (5' 4\")   Wt 62.9 kg (138 lb 10.7 oz)   SpO2 97%   BMI 23.80 kg/m    Telemetry Monitoring: No  Pain: denying pain.  Labs / Tests: monitor cbc  GI: BSC  : BSC  Fluids: has Lactated Ringer's running at 125 mL per hour.  Diet: Regular  Consults: CM  Treatment: monitor and treat low blood count, hydrate, follow md plan  Discharge Disposition: TBD                            "
"Care from [id number]    Blood pressure (!) 164/82, pulse 76, temperature 98.3  F (36.8  C), temperature source Oral, resp. rate 16, height 1.626 m (5' 4\"), weight 62.9 kg (138 lb 10.7 oz), SpO2 98 %.    Admit Date: 8/4/2022  Admitting Diagnosis: severe anemia  Neuro: Alert and Oriented x4  Activity: are 2 assist with Gait Belt and Walker  Telemetry Monitoring: No  Pain: denying pain.  Labs / Tests: monitor cbc  GI: BSC  : BSC  Fluids: has Lactated Ringer's running at 125 mL per hour.  Diet: Regular  Consults: CM  Treatment: monitor and treat low blood count, hydrate, follow md plan  Discharge Disposition: TBD                       "
<-- Click to add NO pertinent Past Medical History

## 2023-02-04 DIAGNOSIS — I10 ESSENTIAL HYPERTENSION: ICD-10-CM

## 2023-02-04 DIAGNOSIS — I63.9 ISCHEMIC STROKE (H): ICD-10-CM

## 2023-02-06 NOTE — TELEPHONE ENCOUNTER
"Routing refill request to provider for review/approval because:  bp out of range    Last Written Prescription Date:  7/17/22  Last Fill Quantity: 90,  # refills: 1   Last office visit provider:  4/4/22     Requested Prescriptions   Pending Prescriptions Disp Refills     lisinopril (ZESTRIL) 10 MG tablet [Pharmacy Med Name: LISINOPRIL 10 MG TABLET] 90 tablet 1     Sig: TAKE 1 TABLET BY MOUTH EVERY DAY       ACE Inhibitors (Including Combos) Protocol Failed - 2/4/2023 10:59 AM        Failed - Blood pressure under 140/90 in past 12 months     BP Readings from Last 3 Encounters:   08/08/22 (!) 176/74   04/04/22 (!) 151/65 03/31/22 (!) 150/71                 Passed - Recent (12 mo) or future (30 days) visit within the authorizing provider's specialty     Patient has had an office visit with the authorizing provider or a provider within the authorizing providers department within the previous 12 mos or has a future within next 30 days. See \"Patient Info\" tab in inbasket, or \"Choose Columns\" in Meds & Orders section of the refill encounter.              Passed - Medication is active on med list        Passed - Patient is age 18 or older        Passed - No active pregnancy on record        Passed - Normal serum creatinine on file in past 12 months     Recent Labs   Lab Test 08/07/22  0416   CR 0.96       Ok to refill medication if creatinine is low          Passed - Normal serum potassium on file in past 12 months     Recent Labs   Lab Test 08/07/22  0416   POTASSIUM 4.2             Passed - No positive pregnancy test within past 12 months           metoprolol tartrate (LOPRESSOR) 25 MG tablet [Pharmacy Med Name: METOPROLOL TARTRATE 25 MG TAB] 180 tablet 1     Sig: TAKE 1 TABLET BY MOUTH TWICE A DAY       Beta-Blockers Protocol Failed - 2/4/2023 10:59 AM        Failed - Blood pressure under 140/90 in past 12 months     BP Readings from Last 3 Encounters:   08/08/22 (!) 176/74   04/04/22 (!) 151/65 03/31/22 (!) 150/71 " "                Passed - Patient is age 6 or older        Passed - Recent (12 mo) or future (30 days) visit within the authorizing provider's specialty     Patient has had an office visit with the authorizing provider or a provider within the authorizing providers department within the previous 12 mos or has a future within next 30 days. See \"Patient Info\" tab in inbasket, or \"Choose Columns\" in Meds & Orders section of the refill encounter.              Passed - Medication is active on med list             Kita Mitchell RN 02/05/23 6:40 PM  "

## 2023-02-07 RX ORDER — METOPROLOL TARTRATE 25 MG/1
TABLET, FILM COATED ORAL
Qty: 180 TABLET | Refills: 1 | Status: SHIPPED | OUTPATIENT
Start: 2023-02-07 | End: 2023-08-08

## 2023-02-07 RX ORDER — LISINOPRIL 10 MG/1
TABLET ORAL
Qty: 90 TABLET | Refills: 1 | Status: SHIPPED | OUTPATIENT
Start: 2023-02-07 | End: 2023-08-09

## 2023-03-15 ENCOUNTER — TELEPHONE (OUTPATIENT)
Dept: FAMILY MEDICINE | Facility: CLINIC | Age: 88
End: 2023-03-15
Payer: COMMERCIAL

## 2023-03-15 NOTE — TELEPHONE ENCOUNTER
Called and spoke to patients daughter Clare. scheduled appointment for Friday 3/17 at 1120, patients daughter will call if unable to make appointment and will possibly change visit to virtual per  so we could order labs,

## 2023-03-15 NOTE — TELEPHONE ENCOUNTER
General Call      Reason for Call:  Hemoglobin Levels    What are your questions or concerns:  Patient told her daughter that she thinks her hemoglobin levels are getting low again. And Patient's daughter states that's she's noticed her sleeping more.    Patient's daughter said that she was told last time to call the clinic and get orders for a lab only appointment to recheck.  They would like those orders placed asap.  Please advise.    Offered triage- they declined.     Date of last appointment with provider: 04/04/22    Could we send this information to you in [employer] or would you prefer to receive a phone call?:   Patient would prefer a phone call     Okay to leave a detailed message?: Yes at Other phone number:  [phone] Clare

## 2023-03-15 NOTE — TELEPHONE ENCOUNTER
Please follow-up.  I should see her if we are doing laboratory testing.  It has been approximately 1 year since her last visit with me and she had a recent hospitalization for anemia so a clinical evaluation is needed.  We can do blood testing at the time of her visit.  I can see her on Friday at 11:2 0.  If that is a problem I could see her at 7:3 0 am on Friday.  If symptoms are becoming severe and she is getting very weak or short of breath then she should go to the emergency department.

## 2023-03-22 NOTE — TELEPHONE ENCOUNTER
Spoke with pt's daughter, Clare.  Called to let her know that I misread the date in the message and offered a date/time that was not approved by Dr Dailey, we are checking to see if pt can still be seen on 3/24 at 11:20 and will call her back to let her know either way. Clare was understanding and not upset, will wait to hear if pt is able to be seen or not, ok to leave VM

## 2023-03-23 NOTE — TELEPHONE ENCOUNTER
Hannah calling in to check and see if Remedios can be seen tomorrow.     Please advise,    Wyatt Giron RN     [facility]

## 2023-03-23 NOTE — TELEPHONE ENCOUNTER
Please call.  I can see her tomorrow (Friday) at 9 AM. Please have her come at 8:30 so I have have her bloodwork done first then see her.

## 2023-03-23 NOTE — TELEPHONE ENCOUNTER
Called daughter Hannah and left detailed message regarding getting Mavis in tomorrow.    Please help schedule if that works out.    Wyatt Giron RN     [facility]

## 2023-03-24 ENCOUNTER — OFFICE VISIT (OUTPATIENT)
Dept: FAMILY MEDICINE | Facility: CLINIC | Age: 88
End: 2023-03-24
Payer: COMMERCIAL

## 2023-03-24 VITALS
RESPIRATION RATE: 18 BRPM | BODY MASS INDEX: 24.55 KG/M2 | WEIGHT: 143 LBS | SYSTOLIC BLOOD PRESSURE: 114 MMHG | HEART RATE: 88 BPM | DIASTOLIC BLOOD PRESSURE: 66 MMHG | TEMPERATURE: 98.5 F | OXYGEN SATURATION: 93 %

## 2023-03-24 DIAGNOSIS — I63.9 ISCHEMIC STROKE (H): ICD-10-CM

## 2023-03-24 DIAGNOSIS — D64.9 ANEMIA, UNSPECIFIED TYPE: Primary | ICD-10-CM

## 2023-03-24 DIAGNOSIS — N18.31 STAGE 3A CHRONIC KIDNEY DISEASE (H): ICD-10-CM

## 2023-03-24 DIAGNOSIS — R53.82 CHRONIC FATIGUE: ICD-10-CM

## 2023-03-24 DIAGNOSIS — E03.9 HYPOTHYROIDISM, UNSPECIFIED TYPE: ICD-10-CM

## 2023-03-24 DIAGNOSIS — K92.2 GASTROINTESTINAL HEMORRHAGE, UNSPECIFIED GASTROINTESTINAL HEMORRHAGE TYPE: ICD-10-CM

## 2023-03-24 DIAGNOSIS — E78.5 HYPERLIPIDEMIA, UNSPECIFIED HYPERLIPIDEMIA TYPE: ICD-10-CM

## 2023-03-24 LAB
ALBUMIN SERPL BCG-MCNC: 3.5 G/DL (ref 3.5–5.2)
ALP SERPL-CCNC: 68 U/L (ref 35–104)
ALT SERPL W P-5'-P-CCNC: 6 U/L (ref 10–35)
ANION GAP SERPL CALCULATED.3IONS-SCNC: 13 MMOL/L (ref 7–15)
AST SERPL W P-5'-P-CCNC: 19 U/L (ref 10–35)
BILIRUB DIRECT SERPL-MCNC: <0.2 MG/DL (ref 0–0.3)
BILIRUB SERPL-MCNC: 0.2 MG/DL
BUN SERPL-MCNC: 23.8 MG/DL (ref 8–23)
CALCIUM SERPL-MCNC: 9 MG/DL (ref 8.8–10.2)
CHLORIDE SERPL-SCNC: 100 MMOL/L (ref 98–107)
CREAT SERPL-MCNC: 1.16 MG/DL (ref 0.51–0.95)
DEPRECATED HCO3 PLAS-SCNC: 23 MMOL/L (ref 22–29)
ERYTHROCYTE [DISTWIDTH] IN BLOOD BY AUTOMATED COUNT: 12.7 % (ref 10–15)
FERRITIN SERPL-MCNC: 142 NG/ML (ref 11–328)
GFR SERPL CREATININE-BSD FRML MDRD: 45 ML/MIN/1.73M2
GLUCOSE SERPL-MCNC: 152 MG/DL (ref 70–99)
HCT VFR BLD AUTO: 38.4 % (ref 35–47)
HGB BLD-MCNC: 12.2 G/DL (ref 11.7–15.7)
IRON BINDING CAPACITY (ROCHE): 237 UG/DL (ref 240–430)
IRON SATN MFR SERPL: 8 % (ref 15–46)
IRON SERPL-MCNC: 18 UG/DL (ref 37–145)
MCH RBC QN AUTO: 30.8 PG (ref 26.5–33)
MCHC RBC AUTO-ENTMCNC: 31.8 G/DL (ref 31.5–36.5)
MCV RBC AUTO: 97 FL (ref 78–100)
PLATELET # BLD AUTO: 230 10E3/UL (ref 150–450)
POTASSIUM SERPL-SCNC: 5 MMOL/L (ref 3.4–5.3)
PROT SERPL-MCNC: 7 G/DL (ref 6.4–8.3)
RBC # BLD AUTO: 3.96 10E6/UL (ref 3.8–5.2)
SODIUM SERPL-SCNC: 136 MMOL/L (ref 136–145)
TSH SERPL DL<=0.005 MIU/L-ACNC: 4.01 UIU/ML (ref 0.3–4.2)
WBC # BLD AUTO: 10.3 10E3/UL (ref 4–11)

## 2023-03-24 PROCEDURE — 83540 ASSAY OF IRON: CPT | Performed by: FAMILY MEDICINE

## 2023-03-24 PROCEDURE — 84443 ASSAY THYROID STIM HORMONE: CPT | Performed by: FAMILY MEDICINE

## 2023-03-24 PROCEDURE — 85027 COMPLETE CBC AUTOMATED: CPT | Performed by: FAMILY MEDICINE

## 2023-03-24 PROCEDURE — 99214 OFFICE O/P EST MOD 30 MIN: CPT | Performed by: FAMILY MEDICINE

## 2023-03-24 PROCEDURE — 82248 BILIRUBIN DIRECT: CPT | Performed by: FAMILY MEDICINE

## 2023-03-24 PROCEDURE — 83550 IRON BINDING TEST: CPT | Performed by: FAMILY MEDICINE

## 2023-03-24 PROCEDURE — 82728 ASSAY OF FERRITIN: CPT | Performed by: FAMILY MEDICINE

## 2023-03-24 PROCEDURE — 80053 COMPREHEN METABOLIC PANEL: CPT | Performed by: FAMILY MEDICINE

## 2023-03-24 PROCEDURE — 36415 COLL VENOUS BLD VENIPUNCTURE: CPT | Performed by: FAMILY MEDICINE

## 2023-03-24 NOTE — PROGRESS NOTES
"  Assessment & Plan     Anemia, unspecified type    Her hemoglobin has improved to 12.2 from 7.9  At her previous hospitalization her Plavix was discontinued and aspirin was decreased to 81 mg  She has declined an upper and lower endoscopy to evaluate for gastrointestinal bleeding  Given that she is stable she will continue the current course  Check aboratory testing as noted    - Basic metabolic panel  - Hepatic function panel  - CBC with platelets  - Ferritin  - Iron and iron binding capacity  - Basic metabolic panel  - Hepatic function panel  - CBC with platelets  - Ferritin  - Iron and iron binding capacity    Gastrointestinal hemorrhage, unspecified gastrointestinal hemorrhage type    As discussed previously, she declines additional testing  We will continue to monitor her hemoglobin which has stabilized    Chronic fatigue    May be multifactorial  That she is no longer anemic  Consider discontinuing metoprolol and switching to an additional medication    Stage 3a chronic kidney disease (H)    Recommend avoiding NSAIDs, especially given her possible gastrointestinal bleed  Continue lisinopril and aspirin as noted  Recommend avoiding nephrotoxic substances  Her blood pressure is good today    Hypothyroidism, unspecified type    Check a thyroid cascade  - TSH with free T4 reflex    Hyperlipidemia, unspecified hyperlipidemia type    Continue to monitor    Ischemic stroke (H)    As noted, Plavix has been discontinued given her gastrointestinal bleed  Aspirin was reduced to 81 mg  She continue to monitor    Cystic pancreatic lesions    She declines additional testing at this time               BMI:   Estimated body mass index is 24.55 kg/m  as calculated from the following:    Height as of 8/4/22: 1.626 m (5' 4\").    Weight as of this encounter: 64.9 kg (143 lb).           Eidn Dailey MD  [facility]    Santa Whittaker is a 88 year old, presenting for the following health " issues:  Follow Up (Possible low hemoglobin )       This is a pleasant 88-year-old female who presents to the clinic with her daughter Hannah.  The primary concern recently is that she has been fatigued and they want to rule out anemia given her history.  She was last seen by this provider in April of 2022.  Interim history is notable for hospital admission from August 4 through August 8 for an acute on chronic anemia and iron deficiency which likely is secondary to a chronic gastrointestinal bleed. Her fecal occult blood test was abnormal.  A CT of the abdomen showed thickening of the left hemicolon consistent with segmental colitis.  Her hemoglobin was 5.2 on presentation and she received 2 units of red blood cells via transfusion.  Given concern for gastrointestinal bleed she was advised to consider endoscopy but she declined. She was treated with IV iron.  She was discharged to home and has not follow-up since then.  It should be noted that she experienced a reaction while receiving the iron infusion. She was nauseated and experienced emesis and was hypotensive. She required IV fluids and epinephrine.      Additionally, she has history of a previous ischemic stroke involving the posterior putamen. She had been treated with aspirin and Plavix. Given her anemia and gastrointestinal bleed her Plavix was discontinued.      It should be noted that she had evaluation by gastroenterology and an upper endoscopy and colonoscopy were recommended given iron deficiency anemia.  She declined this and continues to not want to have this testing done.  She has started iron supplementation.  She also was noted to have a low B12 level and low folate level.      Fortunately, though she has not been seen since her hospitalization in August her hemoglobin has improved to 12.2 today with a previous value of 7.9.  She does have ongoing fatigue but otherwise is feeling well.  She continues to take metoprolol.      Additional Questions  3/24/2023   Roomed by Lora HA CMA   Accompanied by daughter     History of Present Illness       Reason for visit:  Check blood    She eats 2-3 servings of fruits and vegetables daily.She consumes 1 sweetened beverage(s) daily.She exercises with enough effort to increase her heart rate 9 or less minutes per day.  She exercises with enough effort to increase her heart rate 3 or less days per week.   She is taking medications regularly.               Review of Systems   Constitutional, HEENT, cardiovascular, pulmonary, GI, , musculoskeletal, neuro, skin, endocrine and psych systems are negative, except as otherwise noted.      Objective    /66 (BP Location: Left arm, Patient Position: Sitting, Cuff Size: Adult Regular)   Pulse 88   Temp 98.5  F (36.9  C) (Oral)   Resp 18   Wt 64.9 kg (143 lb)   LMP  (LMP Unknown)   SpO2 93%   BMI 24.55 kg/m    Body mass index is 24.55 kg/m .  Physical Exam   GENERAL: healthy, alert and no distress  EYES: Eyes grossly normal to inspection, PERRL and conjunctivae and sclerae normal  RESP: lungs clear to auscultation - no rales, rhonchi or wheezes  CV: regular rate and rhythm, normal S1 S2, no S3 or S4, no murmur, click or rub, no peripheral edema and peripheral pulses strong  ABDOMEN: soft, nontender  NEURO: Normal strength and tone, mentation intact and speech normal  PSYCH: mentation appears normal, affect normal/bright

## 2023-03-24 NOTE — PATIENT INSTRUCTIONS
Remedios,    You may continue current medications  Your hemoglobin is normal at 12.2  Your other tests are pending  Remain as active as possible    Edin Dailey MD

## 2023-05-06 DIAGNOSIS — F39 MOOD DISORDER (H): ICD-10-CM

## 2023-05-07 RX ORDER — SERTRALINE HYDROCHLORIDE 100 MG/1
TABLET, FILM COATED ORAL
Qty: 90 TABLET | Refills: 3 | Status: SHIPPED | OUTPATIENT
Start: 2023-05-07 | End: 2024-05-08

## 2023-05-07 NOTE — TELEPHONE ENCOUNTER
"Last Written Prescription Date:  10/26/22  Last Fill Quantity: 90,  # refills: 1   Last office visit provider:  3/24/23 w/ Dr. Dailey     Requested Prescriptions   Pending Prescriptions Disp Refills     sertraline (ZOLOFT) 100 MG tablet [Pharmacy Med Name: SERTRALINE  MG TABLET] 90 tablet 1     Sig: TAKE 1 TABLET BY MOUTH EVERY DAY       SSRIs Protocol Passed - 5/6/2023  7:23 AM        Passed - Recent (12 mo) or future (30 days) visit within the authorizing provider's specialty     Patient has had an office visit with the authorizing provider or a provider within the authorizing providers department within the previous 12 mos or has a future within next 30 days. See \"Patient Info\" tab in inbasket, or \"Choose Columns\" in Meds & Orders section of the refill encounter.              Passed - Medication is active on med list        Passed - Patient is age 18 or older        Passed - No active pregnancy on record        Passed - No positive pregnancy test in last 12 months             Samreen Mcintosh RN 05/07/23 7:50 AM  "

## 2023-08-08 DIAGNOSIS — I10 ESSENTIAL HYPERTENSION: ICD-10-CM

## 2023-08-08 DIAGNOSIS — I63.9 ISCHEMIC STROKE (H): ICD-10-CM

## 2023-08-08 RX ORDER — METOPROLOL TARTRATE 25 MG/1
TABLET, FILM COATED ORAL
Qty: 180 TABLET | Refills: 2 | Status: SHIPPED | OUTPATIENT
Start: 2023-08-08

## 2023-08-08 NOTE — TELEPHONE ENCOUNTER
"Routing refill request to provider for review/approval because:  Labs out of range:  Cr    Last Written Prescription Date:  2/7/23     Last Fill Quantity: 90,  # refills: 1   Last office visit provider:  3/24/23     Requested Prescriptions   Pending Prescriptions Disp Refills    lisinopril (ZESTRIL) 10 MG tablet [Pharmacy Med Name: LISINOPRIL 10 MG TABLET] 90 tablet 1     Sig: TAKE 1 TABLET BY MOUTH EVERY DAY       ACE Inhibitors (Including Combos) Protocol Failed - 8/8/2023  3:41 AM        Failed - Normal serum creatinine on file in past 12 months     Recent Labs   Lab Test 03/24/23  0901   CR 1.16*       Ok to refill medication if creatinine is low          Passed - Blood pressure under 140/90 in past 12 months     BP Readings from Last 3 Encounters:   03/24/23 114/66   08/08/22 (!) 176/74 04/04/22 (!) 151/65                 Passed - Recent (12 mo) or future (30 days) visit within the authorizing provider's specialty     Patient has had an office visit with the authorizing provider or a provider within the authorizing providers department within the previous 12 mos or has a future within next 30 days. See \"Patient Info\" tab in inbasket, or \"Choose Columns\" in Meds & Orders section of the refill encounter.              Passed - Medication is active on med list        Passed - Patient is age 18 or older        Passed - No active pregnancy on record        Passed - Normal serum potassium on file in past 12 months     Recent Labs   Lab Test 03/24/23  0901   POTASSIUM 5.0             Passed - No positive pregnancy test within past 12 months         Signed Prescriptions Disp Refills    metoprolol tartrate (LOPRESSOR) 25 MG tablet 180 tablet 2     Sig: TAKE 1 TABLET BY MOUTH TWICE A DAY       Beta-Blockers Protocol Passed - 8/8/2023  3:41 AM        Passed - Blood pressure under 140/90 in past 12 months     BP Readings from Last 3 Encounters:   03/24/23 114/66   08/08/22 (!) 176/74 04/04/22 (!) 151/65                 " "Passed - Patient is age 6 or older        Passed - Recent (12 mo) or future (30 days) visit within the authorizing provider's specialty     Patient has had an office visit with the authorizing provider or a provider within the authorizing providers department within the previous 12 mos or has a future within next 30 days. See \"Patient Info\" tab in inbasket, or \"Choose Columns\" in Meds & Orders section of the refill encounter.              Passed - Medication is active on med list             Lynn Jama RN 08/08/23 3:43 AM  "

## 2023-08-08 NOTE — TELEPHONE ENCOUNTER
"Last Written Prescription Date:  2/7/23     Last Fill Quantity: 180,  # refills: 1   Last office visit provider:  3/24/23     Requested Prescriptions   Pending Prescriptions Disp Refills    lisinopril (ZESTRIL) 10 MG tablet [Pharmacy Med Name: LISINOPRIL 10 MG TABLET] 90 tablet 1     Sig: TAKE 1 TABLET BY MOUTH EVERY DAY       ACE Inhibitors (Including Combos) Protocol Failed - 8/8/2023  3:41 AM        Failed - Normal serum creatinine on file in past 12 months     Recent Labs   Lab Test 03/24/23  0901   CR 1.16*       Ok to refill medication if creatinine is low          Passed - Blood pressure under 140/90 in past 12 months     BP Readings from Last 3 Encounters:   03/24/23 114/66   08/08/22 (!) 176/74 04/04/22 (!) 151/65                 Passed - Recent (12 mo) or future (30 days) visit within the authorizing provider's specialty     Patient has had an office visit with the authorizing provider or a provider within the authorizing providers department within the previous 12 mos or has a future within next 30 days. See \"Patient Info\" tab in inbasket, or \"Choose Columns\" in Meds & Orders section of the refill encounter.              Passed - Medication is active on med list        Passed - Patient is age 18 or older        Passed - No active pregnancy on record        Passed - Normal serum potassium on file in past 12 months     Recent Labs   Lab Test 03/24/23  0901   POTASSIUM 5.0             Passed - No positive pregnancy test within past 12 months         Signed Prescriptions Disp Refills    metoprolol tartrate (LOPRESSOR) 25 MG tablet 180 tablet 2     Sig: TAKE 1 TABLET BY MOUTH TWICE A DAY       Beta-Blockers Protocol Passed - 8/8/2023  3:41 AM        Passed - Blood pressure under 140/90 in past 12 months     BP Readings from Last 3 Encounters:   03/24/23 114/66   08/08/22 (!) 176/74   04/04/22 (!) 151/65                 Passed - Patient is age 6 or older        Passed - Recent (12 mo) or future (30 days) " "visit within the authorizing provider's specialty     Patient has had an office visit with the authorizing provider or a provider within the authorizing providers department within the previous 12 mos or has a future within next 30 days. See \"Patient Info\" tab in inbasket, or \"Choose Columns\" in Meds & Orders section of the refill encounter.              Passed - Medication is active on med list             Lynn Jama RN 08/08/23 3:45 AM  "

## 2023-08-09 RX ORDER — LISINOPRIL 10 MG/1
TABLET ORAL
Qty: 90 TABLET | Refills: 1 | Status: SHIPPED | OUTPATIENT
Start: 2023-08-09 | End: 2024-02-07

## 2023-09-05 ENCOUNTER — OFFICE VISIT (OUTPATIENT)
Dept: FAMILY MEDICINE | Facility: CLINIC | Age: 88
End: 2023-09-05
Payer: COMMERCIAL

## 2023-09-05 VITALS
OXYGEN SATURATION: 90 % | RESPIRATION RATE: 18 BRPM | HEIGHT: 64 IN | DIASTOLIC BLOOD PRESSURE: 89 MMHG | SYSTOLIC BLOOD PRESSURE: 131 MMHG | BODY MASS INDEX: 25.27 KG/M2 | TEMPERATURE: 98.4 F | HEART RATE: 87 BPM | WEIGHT: 148 LBS

## 2023-09-05 DIAGNOSIS — I10 ESSENTIAL HYPERTENSION: ICD-10-CM

## 2023-09-05 DIAGNOSIS — E03.9 HYPOTHYROIDISM, UNSPECIFIED TYPE: ICD-10-CM

## 2023-09-05 DIAGNOSIS — R53.82 CHRONIC FATIGUE: Primary | ICD-10-CM

## 2023-09-05 DIAGNOSIS — D64.9 ANEMIA, UNSPECIFIED TYPE: ICD-10-CM

## 2023-09-05 DIAGNOSIS — E78.5 HYPERLIPIDEMIA, UNSPECIFIED HYPERLIPIDEMIA TYPE: ICD-10-CM

## 2023-09-05 DIAGNOSIS — N18.31 STAGE 3A CHRONIC KIDNEY DISEASE (H): ICD-10-CM

## 2023-09-05 DIAGNOSIS — D51.9 ANEMIA DUE TO VITAMIN B12 DEFICIENCY, UNSPECIFIED B12 DEFICIENCY TYPE: ICD-10-CM

## 2023-09-05 LAB
ALBUMIN SERPL BCG-MCNC: 4.3 G/DL (ref 3.5–5.2)
ALP SERPL-CCNC: 61 U/L (ref 35–104)
ALT SERPL W P-5'-P-CCNC: 13 U/L (ref 0–50)
ANION GAP SERPL CALCULATED.3IONS-SCNC: 10 MMOL/L (ref 7–15)
AST SERPL W P-5'-P-CCNC: 22 U/L (ref 0–45)
BASOPHILS # BLD AUTO: 0 10E3/UL (ref 0–0.2)
BASOPHILS NFR BLD AUTO: 0 %
BILIRUB SERPL-MCNC: 0.2 MG/DL
BUN SERPL-MCNC: 31.6 MG/DL (ref 8–23)
CALCIUM SERPL-MCNC: 8.9 MG/DL (ref 8.8–10.2)
CHLORIDE SERPL-SCNC: 107 MMOL/L (ref 98–107)
CREAT SERPL-MCNC: 1.3 MG/DL (ref 0.51–0.95)
DEPRECATED HCO3 PLAS-SCNC: 25 MMOL/L (ref 22–29)
EOSINOPHIL # BLD AUTO: 0.1 10E3/UL (ref 0–0.7)
EOSINOPHIL NFR BLD AUTO: 2 %
ERYTHROCYTE [DISTWIDTH] IN BLOOD BY AUTOMATED COUNT: 13.5 % (ref 10–15)
FERRITIN SERPL-MCNC: 50 NG/ML (ref 11–328)
GFR SERPL CREATININE-BSD FRML MDRD: 39 ML/MIN/1.73M2
GLUCOSE SERPL-MCNC: 113 MG/DL (ref 70–99)
HBA1C MFR BLD: 5.7 % (ref 0–5.6)
HCT VFR BLD AUTO: 42.8 % (ref 35–47)
HGB BLD-MCNC: 13.5 G/DL (ref 11.7–15.7)
IMM GRANULOCYTES # BLD: 0 10E3/UL
IMM GRANULOCYTES NFR BLD: 0 %
IRON BINDING CAPACITY (ROCHE): 308 UG/DL (ref 240–430)
IRON SATN MFR SERPL: 28 % (ref 15–46)
IRON SERPL-MCNC: 87 UG/DL (ref 37–145)
LYMPHOCYTES # BLD AUTO: 0.9 10E3/UL (ref 0.8–5.3)
LYMPHOCYTES NFR BLD AUTO: 17 %
MCH RBC QN AUTO: 31 PG (ref 26.5–33)
MCHC RBC AUTO-ENTMCNC: 31.5 G/DL (ref 31.5–36.5)
MCV RBC AUTO: 98 FL (ref 78–100)
MONOCYTES # BLD AUTO: 0.4 10E3/UL (ref 0–1.3)
MONOCYTES NFR BLD AUTO: 7 %
NEUTROPHILS # BLD AUTO: 3.8 10E3/UL (ref 1.6–8.3)
NEUTROPHILS NFR BLD AUTO: 74 %
PLATELET # BLD AUTO: 164 10E3/UL (ref 150–450)
POTASSIUM SERPL-SCNC: 5.1 MMOL/L (ref 3.4–5.3)
PROT SERPL-MCNC: 6.9 G/DL (ref 6.4–8.3)
RBC # BLD AUTO: 4.36 10E6/UL (ref 3.8–5.2)
SODIUM SERPL-SCNC: 142 MMOL/L (ref 136–145)
TSH SERPL DL<=0.005 MIU/L-ACNC: 4.06 UIU/ML (ref 0.3–4.2)
VIT B12 SERPL-MCNC: 241 PG/ML (ref 232–1245)
WBC # BLD AUTO: 5.2 10E3/UL (ref 4–11)

## 2023-09-05 PROCEDURE — 36415 COLL VENOUS BLD VENIPUNCTURE: CPT | Performed by: FAMILY MEDICINE

## 2023-09-05 PROCEDURE — 90662 IIV NO PRSV INCREASED AG IM: CPT | Performed by: FAMILY MEDICINE

## 2023-09-05 PROCEDURE — 99000 SPECIMEN HANDLING OFFICE-LAB: CPT | Performed by: FAMILY MEDICINE

## 2023-09-05 PROCEDURE — 83550 IRON BINDING TEST: CPT | Performed by: FAMILY MEDICINE

## 2023-09-05 PROCEDURE — 82607 VITAMIN B-12: CPT | Performed by: FAMILY MEDICINE

## 2023-09-05 PROCEDURE — 84443 ASSAY THYROID STIM HORMONE: CPT | Performed by: FAMILY MEDICINE

## 2023-09-05 PROCEDURE — 84207 ASSAY OF VITAMIN B-6: CPT | Mod: 90 | Performed by: FAMILY MEDICINE

## 2023-09-05 PROCEDURE — 80053 COMPREHEN METABOLIC PANEL: CPT | Performed by: FAMILY MEDICINE

## 2023-09-05 PROCEDURE — G0008 ADMIN INFLUENZA VIRUS VAC: HCPCS | Performed by: FAMILY MEDICINE

## 2023-09-05 PROCEDURE — 83540 ASSAY OF IRON: CPT | Performed by: FAMILY MEDICINE

## 2023-09-05 PROCEDURE — 85025 COMPLETE CBC W/AUTO DIFF WBC: CPT | Performed by: FAMILY MEDICINE

## 2023-09-05 PROCEDURE — 99214 OFFICE O/P EST MOD 30 MIN: CPT | Mod: 25 | Performed by: FAMILY MEDICINE

## 2023-09-05 PROCEDURE — 83036 HEMOGLOBIN GLYCOSYLATED A1C: CPT | Performed by: FAMILY MEDICINE

## 2023-09-05 PROCEDURE — 82306 VITAMIN D 25 HYDROXY: CPT | Performed by: FAMILY MEDICINE

## 2023-09-05 PROCEDURE — 82728 ASSAY OF FERRITIN: CPT | Performed by: FAMILY MEDICINE

## 2023-09-05 ASSESSMENT — ENCOUNTER SYMPTOMS: FATIGUE: 1

## 2023-09-05 NOTE — PROGRESS NOTES
"  Assessment & Plan     Chronic fatigue    May be multifactorial    Will check laboratory testing including blood counts with iron studies as well as B12 level and thyroid check  Consider further evaluation as warranted  Patient preference is to not do significant testing or treatment    - Vitamin D Deficiency; Future  - Vitamin D Deficiency    Anemia, unspecified type    There has been concern about a possible gastrointestinal bleed in the past  She declined an upper endoscopy and colonoscopy is still prefers to decline that  Check laboratory testing as noted    - CBC with Platelets & Differential; Future  - Ferritin; Future  - Iron and iron binding capacity; Future  - Vitamin B12; Future  - Vitamin B6; Future  - CBC with Platelets & Differential  - Ferritin  - Iron and iron binding capacity  - Vitamin B12  - Vitamin B6    Hyperlipidemia, unspecified hyperlipidemia type    Continue to monitor    Stage 3a chronic kidney disease (H)  Check laboratory testing  Recommend avoiding NSAIDs and nephrotoxic substance  Check laboratory testing    - Comprehensive metabolic panel; Future  - Hemoglobin A1c; Future  - Comprehensive metabolic panel  - Hemoglobin A1c    Hypothyroidism, unspecified type  Check a thyroid cascade    - TSH with free T4 reflex; Future  - TSH with free T4 reflex    Essential hypertension  Currently stable  Continue lisinopril and metoprolol    Mood disorder    Recommend decreasing sertraline in case that is contributing to her symptoms of fatigue             BMI:   Estimated body mass index is 25.4 kg/m  as calculated from the following:    Height as of this encounter: 1.626 m (5' 4\").    Weight as of this encounter: 67.1 kg (148 lb).           Edin Dailey MD  [facility]    Santa Whittaker is a 89 year old, presenting for the following health issues:  Fatigue (Patient would like labs checked, has been feeling very run down and tired. )    Remedios presents to the " clinic today in follow-up.  She has had ongoing fatigue and her daughter has noted that she has been sleeping more. There have been some memory concerns from her daughter. Remedios does admit her energy level has been low but has been stable. She tends to nap a lot. She still lives independently and has the support of her children and of neighbors as well. She still does not wish to pursue a lot of testing.     As noted, she has had a history of chronic fatigue as well as anemia.    At the last visit we reviewed a hospital admission from August 4 through August 8 of 2022 for an acute on chronic anemia and iron deficiency which likely is secondary to a chronic gastrointestinal bleed. Her fecal occult blood test was abnormal.  A CT of the abdomen showed thickening of the left hemicolon consistent with segmental colitis.  Her hemoglobin was 5.2 on presentation and she received 2 units of red blood cells via transfusion.  Given concern for gastrointestinal bleed she was advised to consider endoscopy but she declined. She was treated with IV iron.  She was discharged to home and has not follow-up since then.  It should be noted that she experienced a reaction while receiving the iron infusion. She was nauseated and experienced emesis and was hypotensive. She required IV fluids and epinephrine.      Additionally, she has history of a previous ischemic stroke involving the posterior putamen. She had been treated with aspirin and Plavix. Given her anemia and gastrointestinal bleed her Plavix was discontinued. She does not believe she has had significant residual symptoms from her stroke.      It should be noted that she had evaluation by gastroenterology and an upper endoscopy and colonoscopy were recommended given iron deficiency anemia.  She declined this and continues to not want to have this testing done.  She has started iron supplementation.  She also was noted to have a low B12 level and low folate level.              "9/5/2023     4:10 PM   Additional Questions   Roomed by Lora HA CMA   Accompanied by Isabel Daughter       Fatigue  Associated symptoms include fatigue.   History of Present Illness       Reason for visit:  Want to check my hemoglobin    She eats 2-3 servings of fruits and vegetables daily.She consumes 1 sweetened beverage(s) daily.She exercises with enough effort to increase her heart rate 9 or less minutes per day.  She exercises with enough effort to increase her heart rate 3 or less days per week.   She is taking medications regularly.               Review of Systems   Constitutional:  Positive for fatigue.            Objective    /89 (BP Location: Left arm, Patient Position: Sitting, Cuff Size: Adult Regular)   Pulse 87   Temp 98.4  F (36.9  C) (Oral)   Resp 18   Ht 1.626 m (5' 4\")   Wt 67.1 kg (148 lb)   LMP  (LMP Unknown)   SpO2 90%   BMI 25.40 kg/m    Body mass index is 25.4 kg/m .  Physical Exam   GENERAL: healthy, alert and no distress  EYES: Eyes grossly normal to inspection, PERRL and conjunctivae and sclerae normal  RESP: lungs clear to auscultation - no rales, rhonchi or wheezes  CV: regular rate and rhythm, normal S1 S2, no S3 or S4, no murmur, click or rub, no peripheral edema and peripheral pulses strong  MS: no gross musculoskeletal defects noted, no edema  PSYCH: mentation appears normal, affect normal/bright                      "

## 2023-09-05 NOTE — PATIENT INSTRUCTIONS
Remedios,    It was great to see you  We will check extensive laboratory testing as we discussed  Depending on results we can consider changes such as increasing iron if needed  We discussed having you cut sertraline in half to 50 mg  We may need to consider decreasing metoprolol in the past as that can contribute to fatigue  Remain as physically active as possible  You received a flu shot today  You can consider a COVID booster and RSV vaccine    Edin Dailey MD

## 2023-09-05 NOTE — LETTER
September 10, 2023      Remedios Vincent  [address]  [facility] [address]        Dear Remedios,    Here is a copy of your test results.    First, the blood counts are normal which is good. The iron levels are better. You can still consider a low dose of iron supplementation to raise the ferritin level even more.     The vitamin D level is low. I recommend at least 5,000 units of Vitamin D. This may help your energy level. You can get vitamin D3 5,000 units can be purchased over the counter.     Also, the B12 level is borderline low so I do recommend an oral B12 supplement of 1000 mcg daily.     Supplementing vitamin D and B12 may help your energy level.     Finally, the blood sugar test is mildly elevated and in the prediabetic range so limit carbohydrates in the diet (starches, pastas, breads, and sugars).     The kidney tests are abnormal as well. Make sure to drink plenty of liquids and try to avoid ibuprofen/advil which can be hard on the kidneys. Tylenol can be helpful.    Edin Dailey MD         Resulted Orders   Comprehensive metabolic panel   Result Value Ref Range    Sodium 142 136 - 145 mmol/L    Potassium 5.1 3.4 - 5.3 mmol/L    Chloride 107 98 - 107 mmol/L    Carbon Dioxide (CO2) 25 22 - 29 mmol/L    Anion Gap 10 7 - 15 mmol/L    Urea Nitrogen 31.6 (H) 8.0 - 23.0 mg/dL    Creatinine 1.30 (H) 0.51 - 0.95 mg/dL    Calcium 8.9 8.8 - 10.2 mg/dL    Glucose 113 (H) 70 - 99 mg/dL    Alkaline Phosphatase 61 35 - 104 U/L    AST 22 0 - 45 U/L      Comment:      Reference intervals for this test were updated on 6/12/2023 to more accurately reflect our healthy population. There may be differences in the flagging of prior results with similar values performed with this method. Interpretation of those prior results can be made in the context of the updated reference intervals.    ALT 13 0 - 50 U/L      Comment:      Reference intervals for this test were updated on 6/12/2023 to more accurately  reflect our healthy population. There may be differences in the flagging of prior results with similar values performed with this method. Interpretation of those prior results can be made in the context of the updated reference intervals.      Protein Total 6.9 6.4 - 8.3 g/dL    Albumin 4.3 3.5 - 5.2 g/dL    Bilirubin Total 0.2 <=1.2 mg/dL    GFR Estimate 39 (L) >60 mL/min/1.73m2   Hemoglobin A1c   Result Value Ref Range    Hemoglobin A1C 5.7 (H) 0.0 - 5.6 %      Comment:      Normal <5.7%   Prediabetes 5.7-6.4%    Diabetes 6.5% or higher     Note: Adopted from ADA consensus guidelines.   TSH with free T4 reflex   Result Value Ref Range    TSH 4.06 0.30 - 4.20 uIU/mL   Vitamin D Deficiency   Result Value Ref Range    Vitamin D, Total (25-Hydroxy) 8 (L) 20 - 75 ug/L    Narrative    Season, race, dietary intake, and treatment affect the concentration of 25-hydroxy-Vitamin D. Values may decrease during winter months and increase during summer months. Values 20-29 ug/L may indicate Vitamin D insufficiency and values <20 ug/L may indicate Vitamin D deficiency.    Vitamin D determination is routinely performed by an immunoassay specific for 25 hydroxyvitamin D3.  If an individual is on vitamin D2(ergocalciferol) supplementation, please specify 25 OH vitamin D2 and D3 level determination by LCMSMS test [id number].     Ferritin   Result Value Ref Range    Ferritin 50 11 - 328 ng/mL   Iron and iron binding capacity   Result Value Ref Range    Iron 87 37 - 145 ug/dL    Iron Binding Capacity 308 240 - 430 ug/dL    Iron Sat Index 28 15 - 46 %   Vitamin B12   Result Value Ref Range    Vitamin B12 241 232 - 1,245 pg/mL   Vitamin B6   Result Value Ref Range    Vitamin B6 32.8 20.0 - 125.0 nmol/L      Comment:      INTERPRETIVE INFORMATION: Vitamin B6 (Pyridoxal 5-Phosphate)    Pyridoxal 5'-phosphate measured in a specimen collected   following an 8-hour or overnight fast accurately indicates   vitamin B6 nutritional status.  Non-fasting specimen   concentration reflects recent vitamin intake.    This test was developed and its performance characteristics   determined by [facility]. It has not been cleared or   approved by the US Food and Drug Administration. This test   was performed in a CLIA certified laboratory and is   intended for clinical purposes.  Performed By: [facility]  [address] [place] [id number]  : Eduardo Michael MD, PhD  CLIA Number: [id number]   CBC with platelets and differential   Result Value Ref Range    WBC Count 5.2 4.0 - 11.0 10e3/uL    RBC Count 4.36 3.80 - 5.20 10e6/uL    Hemoglobin 13.5 11.7 - 15.7 g/dL    Hematocrit 42.8 35.0 - 47.0 %    MCV 98 78 - 100 fL    MCH 31.0 26.5 - 33.0 pg    MCHC 31.5 31.5 - 36.5 g/dL    RDW 13.5 10.0 - 15.0 %    Platelet Count 164 150 - 450 10e3/uL    % Neutrophils 74 %    % Lymphocytes 17 %    % Monocytes 7 %    % Eosinophils 2 %    % Basophils 0 %    % Immature Granulocytes 0 %    Absolute Neutrophils 3.8 1.6 - 8.3 10e3/uL    Absolute Lymphocytes 0.9 0.8 - 5.3 10e3/uL    Absolute Monocytes 0.4 0.0 - 1.3 10e3/uL    Absolute Eosinophils 0.1 0.0 - 0.7 10e3/uL    Absolute Basophils 0.0 0.0 - 0.2 10e3/uL    Absolute Immature Granulocytes 0.0 <=0.4 10e3/uL       If you have any questions or concerns, please call the clinic at the number listed above.       Sincerely,      Edin Dailey MD

## 2023-09-06 LAB — DEPRECATED CALCIDIOL+CALCIFEROL SERPL-MC: 8 UG/L (ref 20–75)

## 2023-09-09 LAB — PYRIDOXAL PHOS SERPL-SCNC: 32.8 NMOL/L

## 2023-09-09 RX ORDER — MAGNESIUM 200 MG
1000 TABLET ORAL DAILY
Qty: 90 TABLET | Refills: 3
Start: 2023-09-09

## 2023-09-23 ENCOUNTER — HEALTH MAINTENANCE LETTER (OUTPATIENT)
Age: 88
End: 2023-09-23

## 2024-02-07 DIAGNOSIS — I10 ESSENTIAL HYPERTENSION: ICD-10-CM

## 2024-02-07 RX ORDER — LISINOPRIL 10 MG/1
TABLET ORAL
Qty: 90 TABLET | Refills: 1 | Status: SHIPPED | OUTPATIENT
Start: 2024-02-07

## 2024-04-10 ENCOUNTER — OFFICE VISIT (OUTPATIENT)
Dept: FAMILY MEDICINE | Facility: CLINIC | Age: 89
End: 2024-04-10
Payer: COMMERCIAL

## 2024-04-10 VITALS
OXYGEN SATURATION: 90 % | WEIGHT: 150.8 LBS | HEART RATE: 67 BPM | SYSTOLIC BLOOD PRESSURE: 138 MMHG | BODY MASS INDEX: 25.74 KG/M2 | RESPIRATION RATE: 18 BRPM | DIASTOLIC BLOOD PRESSURE: 76 MMHG | TEMPERATURE: 98.2 F | HEIGHT: 64 IN

## 2024-04-10 DIAGNOSIS — N18.31 STAGE 3A CHRONIC KIDNEY DISEASE (H): ICD-10-CM

## 2024-04-10 DIAGNOSIS — E78.00 HYPERCHOLESTEROLEMIA: ICD-10-CM

## 2024-04-10 DIAGNOSIS — Z23 COVID-19 VACCINE ADMINISTERED: ICD-10-CM

## 2024-04-10 DIAGNOSIS — G47.00 INSOMNIA, UNSPECIFIED TYPE: ICD-10-CM

## 2024-04-10 DIAGNOSIS — R53.83 OTHER FATIGUE: ICD-10-CM

## 2024-04-10 DIAGNOSIS — R79.9 ABNORMAL FINDING OF BLOOD CHEMISTRY, UNSPECIFIED: ICD-10-CM

## 2024-04-10 DIAGNOSIS — I10 ESSENTIAL HYPERTENSION: Primary | ICD-10-CM

## 2024-04-10 DIAGNOSIS — E03.9 HYPOTHYROIDISM, UNSPECIFIED TYPE: ICD-10-CM

## 2024-04-10 DIAGNOSIS — R73.09 ELEVATED GLUCOSE: ICD-10-CM

## 2024-04-10 DIAGNOSIS — R79.89 LOW SERUM VITAMIN D: ICD-10-CM

## 2024-04-10 DIAGNOSIS — N18.31 CHRONIC KIDNEY DISEASE, STAGE 3A (H): ICD-10-CM

## 2024-04-10 PROBLEM — I95.9 HYPOTENSION: Status: RESOLVED | Noted: 2022-08-05 | Resolved: 2024-04-10

## 2024-04-10 LAB
ERYTHROCYTE [DISTWIDTH] IN BLOOD BY AUTOMATED COUNT: 13.8 % (ref 10–15)
HBA1C MFR BLD: 5.5 % (ref 0–5.6)
HCT VFR BLD AUTO: 39.5 % (ref 35–47)
HGB BLD-MCNC: 12.5 G/DL (ref 11.7–15.7)
MCH RBC QN AUTO: 31.4 PG (ref 26.5–33)
MCHC RBC AUTO-ENTMCNC: 31.6 G/DL (ref 31.5–36.5)
MCV RBC AUTO: 99 FL (ref 78–100)
PLATELET # BLD AUTO: 174 10E3/UL (ref 150–450)
RBC # BLD AUTO: 3.98 10E6/UL (ref 3.8–5.2)
WBC # BLD AUTO: 5.6 10E3/UL (ref 4–11)

## 2024-04-10 PROCEDURE — 36415 COLL VENOUS BLD VENIPUNCTURE: CPT | Performed by: FAMILY MEDICINE

## 2024-04-10 PROCEDURE — 83036 HEMOGLOBIN GLYCOSYLATED A1C: CPT | Performed by: FAMILY MEDICINE

## 2024-04-10 PROCEDURE — G0009 ADMIN PNEUMOCOCCAL VACCINE: HCPCS | Performed by: FAMILY MEDICINE

## 2024-04-10 PROCEDURE — 82306 VITAMIN D 25 HYDROXY: CPT | Performed by: FAMILY MEDICINE

## 2024-04-10 PROCEDURE — 84443 ASSAY THYROID STIM HORMONE: CPT | Performed by: FAMILY MEDICINE

## 2024-04-10 PROCEDURE — 83550 IRON BINDING TEST: CPT | Performed by: FAMILY MEDICINE

## 2024-04-10 PROCEDURE — 80053 COMPREHEN METABOLIC PANEL: CPT | Performed by: FAMILY MEDICINE

## 2024-04-10 PROCEDURE — 85027 COMPLETE CBC AUTOMATED: CPT | Performed by: FAMILY MEDICINE

## 2024-04-10 PROCEDURE — 90480 ADMN SARSCOV2 VAC 1/ONLY CMP: CPT | Performed by: FAMILY MEDICINE

## 2024-04-10 PROCEDURE — 83540 ASSAY OF IRON: CPT | Performed by: FAMILY MEDICINE

## 2024-04-10 PROCEDURE — 99214 OFFICE O/P EST MOD 30 MIN: CPT | Mod: 25 | Performed by: FAMILY MEDICINE

## 2024-04-10 PROCEDURE — 82607 VITAMIN B-12: CPT | Performed by: FAMILY MEDICINE

## 2024-04-10 PROCEDURE — 91320 SARSCV2 VAC 30MCG TRS-SUC IM: CPT | Performed by: FAMILY MEDICINE

## 2024-04-10 PROCEDURE — 82728 ASSAY OF FERRITIN: CPT | Performed by: FAMILY MEDICINE

## 2024-04-10 PROCEDURE — 90677 PCV20 VACCINE IM: CPT | Performed by: FAMILY MEDICINE

## 2024-04-10 RX ORDER — TRAZODONE HYDROCHLORIDE 50 MG/1
50 TABLET, FILM COATED ORAL AT BEDTIME
Qty: 30 TABLET | Refills: 1 | Status: SHIPPED | OUTPATIENT
Start: 2024-04-10 | End: 2024-05-10

## 2024-04-10 RX ORDER — RESPIRATORY SYNCYTIAL VIRUS VACCINE 120MCG/0.5
0.5 KIT INTRAMUSCULAR ONCE
Qty: 1 EACH | Refills: 0 | Status: CANCELLED | OUTPATIENT
Start: 2024-04-10 | End: 2024-04-10

## 2024-04-10 ASSESSMENT — ENCOUNTER SYMPTOMS: FATIGUE: 1

## 2024-04-10 NOTE — LETTER
September 28, 2024      Remedios Vincent  [address]  [facility] [address]        Dear Remedios,    Here is a copy of your most recent test results.     In general, they are stable.    Your kidney function is reduced. Continue your current medications. Drink plenty of water and avoid ibupfrofen/Advil/Motrin which can be hard on your kidneys.    You may take tylenol.    Your blood counts and iron level are good. Your thyroid level and blood sugar tests are normal.     Your vitamin D level is low. I do recommend that you take 2,000 units of vitamin D3 daily. You can get this over the counter. This may help your energy level.        Resulted Orders   Comprehensive metabolic panel   Result Value Ref Range    Sodium 138 135 - 145 mmol/L      Comment:      Reference intervals for this test were updated on 09/26/2023 to more accurately reflect our healthy population. There may be differences in the flagging of prior results with similar values performed with this method. Interpretation of those prior results can be made in the context of the updated reference intervals.     Potassium 5.4 (H) 3.4 - 5.3 mmol/L    Carbon Dioxide (CO2) 25 22 - 29 mmol/L    Anion Gap 9 7 - 15 mmol/L    Urea Nitrogen 27.6 (H) 8.0 - 23.0 mg/dL    Creatinine 1.25 (H) 0.51 - 0.95 mg/dL    GFR Estimate 41 (L) >60 mL/min/1.73m2    Calcium 8.4 (L) 8.8 - 10.2 mg/dL    Chloride 104 98 - 107 mmol/L    Glucose 99 70 - 99 mg/dL    Alkaline Phosphatase 73 40 - 150 U/L      Comment:      Reference intervals for this test were updated on 11/14/2023 to more accurately reflect our healthy population. There may be differences in the flagging of prior results with similar values performed with this method. Interpretation of those prior results can be made in the context of the updated reference intervals.    AST 17 0 - 45 U/L      Comment:      Reference intervals for this test were updated on 6/12/2023 to more accurately reflect our healthy  population. There may be differences in the flagging of prior results with similar values performed with this method. Interpretation of those prior results can be made in the context of the updated reference intervals.    ALT 9 0 - 50 U/L      Comment:      Reference intervals for this test were updated on 6/12/2023 to more accurately reflect our healthy population. There may be differences in the flagging of prior results with similar values performed with this method. Interpretation of those prior results can be made in the context of the updated reference intervals.      Protein Total 6.7 6.4 - 8.3 g/dL    Albumin 4.0 3.5 - 5.2 g/dL    Bilirubin Total 0.2 <=1.2 mg/dL   CBC with platelets   Result Value Ref Range    WBC Count 5.6 4.0 - 11.0 10e3/uL    RBC Count 3.98 3.80 - 5.20 10e6/uL    Hemoglobin 12.5 11.7 - 15.7 g/dL    Hematocrit 39.5 35.0 - 47.0 %    MCV 99 78 - 100 fL    MCH 31.4 26.5 - 33.0 pg    MCHC 31.6 31.5 - 36.5 g/dL    RDW 13.8 10.0 - 15.0 %    Platelet Count 174 150 - 450 10e3/uL   Hemoglobin A1c   Result Value Ref Range    Hemoglobin A1C 5.5 0.0 - 5.6 %      Comment:      Normal <5.7%   Prediabetes 5.7-6.4%    Diabetes 6.5% or higher     Note: Adopted from ADA consensus guidelines.   Vitamin D Deficiency   Result Value Ref Range    Vitamin D, Total (25-Hydroxy) 10 (L) 20 - 50 ng/mL      Comment:      mild to moderate deficiency    Narrative    Season, race, dietary intake, and treatment affect the concentration of 25-hydroxy-Vitamin D. Values may decrease during winter months and increase during summer months.    Vitamin D determination is routinely performed by an immunoassay specific for 25 hydroxyvitamin D3.  If an individual is on vitamin D2(ergocalciferol) supplementation, please specify 25 OH vitamin D2 and D3 level determination by LCMSMS test [id number].     TSH with free T4 reflex   Result Value Ref Range    TSH 3.59 0.30 - 4.20 uIU/mL   Vitamin B12   Result Value Ref Range    Vitamin B12 234  232 - 1,245 pg/mL   Ferritin   Result Value Ref Range    Ferritin 28 11 - 328 ng/mL   Iron and iron binding capacity   Result Value Ref Range    Iron 53 37 - 145 ug/dL    Iron Binding Capacity 291 240 - 430 ug/dL    Iron Sat Index 18 15 - 46 %       If you have any questions or concerns, please call the clinic at the number listed above.       Sincerely,      Edin Dailey MD

## 2024-04-10 NOTE — PATIENT INSTRUCTIONS
Remedios,    It was so good to see you! You made my day    You received a COVID-vaccine booster and pneumonia shot today called pneumococcal 20    You can check with insurance and see if they will cover the RSV (respiratory syncytial virus) vaccine and shingles vaccine.  You may need to get those at your pharmacy    We will check your laboratory testing as noted    I do recommend that you take a vitamin D3 supplement of 5000 units daily.  You can get that over-the-counter    I do recommend that you continue with a vitamin B12 supplement as well.    Edin Dailey MD

## 2024-04-10 NOTE — PROGRESS NOTES
Assessment & Plan     Essential hypertension    Currently well-controlled  Continue lisinopril and metoprolol  Monitor blood pressure    Other fatigue  May be multifactorial    In the remote past she had anemia and has had mildly low ferritin levels  Her vitamin D level has been low  Additionally, she had an elevated hemoglobin A1c in the prediabetic range    Check laboratory testing as noted  Reviewed sleep hygiene as well she will start trazodone as noted.   Recommend vitamin D3 5000 units daily  Recommended B12 supplementation as well  Recommend adequate rest, exercise, and nutrition  Consider further evaluation as appropriate  Can consider an echocardiogram for further evaluation    - Comprehensive metabolic panel; Future  - CBC with platelets; Future  - Vitamin B12; Future  - Ferritin; Future  - Iron and iron binding capacity; Future  - Comprehensive metabolic panel  - CBC with platelets  - Vitamin B12  - Ferritin  - Iron and iron binding capacity    Stage 3a chronic kidney disease (H)  Check laboratory testing  Recommend increased fluids  She will continue lisinopril    Hypercholesterolemia  Continue to monitor    Hypothyroidism, unspecified type  Check a thyroid cascade  - TSH with free T4 reflex; Future  - TSH with free T4 reflex    Low serum vitamin D  Recommend vitamin D3 5000 units daily  Check a vitamin D level  - Vitamin D Deficiency; Future  - Vitamin D Deficiency    Elevated glucose  Prediabetes    Check hemoglobin A1c  Recommend limiting carbohydrates  - Hemoglobin A1c; Future  - Hemoglobin A1c      History of iron deficiency anemia    - Ferritin; Future  - Iron and iron binding capacity; Future  - Ferritin  - Iron and iron binding capacity    COVID-19 vaccine administered    - COVID-19 mRNA vaccine 12+y (COMIRNATY, PFIZER,) 30 MCG/0.3ML injection; Inject 0.3 mLs (30 mcg) into the muscle once for 1 dose    Insomnia, unspecified type    Reviewed sleep hygiene  Recommend trazodone one half of a 50 mg  "pill daily  She will monitor for side effects    - traZODone (DESYREL) 50 MG tablet; Take 1 tablet (50 mg) by mouth at bedtime    The longitudinal plan of care for the diagnosis(es)/condition(s) as documented were addressed during this visit. Due to the added complexity in care, I will continue to support Remedios in the subsequent management and with ongoing continuity of care.     Essential hypertension            BMI  Estimated body mass index is 25.88 kg/m  as calculated from the following:    Height as of this encounter: 1.626 m (5' 4\").    Weight as of this encounter: 68.4 kg (150 lb 12.8 oz).             Subjective   Remedios is a 89 year old, presenting for the following health issues:  Fatigue (C/o ongoing fatigue)        4/10/2024     1:47 PM   Additional Questions   Roomed by Lora HA CMA     Fatigue  Associated symptoms include fatigue.   History of Present Illness       Reason for visit:  Sleep    She eats 0-1 servings of fruits and vegetables daily.She consumes 1 sweetened beverage(s) daily.She exercises with enough effort to increase her heart rate 9 or less minutes per day.  She exercises with enough effort to increase her heart rate 3 or less days per week.   She is taking medications regularly.     Remedios is a pleasant 89-year-old female who presents to the clinic primarily because of concern regarding fatigue.  This has been discussed at previous visits.  She states that she feels otherwise well but just has some fatigue.  She does note that sleep has been a problem.  She can fall asleep without difficulty but then will wake up and have a hard time getting back to sleep.     As noted at previous visits, she has had a history of chronic fatigue as well as anemia.     At the last visit we reviewed a hospital admission in 2022 for an acute on chronic anemia and iron deficiency which likely is secondary to a chronic gastrointestinal bleed. Her fecal occult blood test was abnormal.  A CT of the abdomen showed " "thickening of the left hemicolon consistent with segmental colitis.  Her hemoglobin was 5.2 on presentation and she received 2 units of red blood cells via transfusion.  Given concern for gastrointestinal bleed she was advised to consider endoscopy but she declined. She was treated with IV iron.  She was discharged to home and has not follow-up since then.  It should be noted that she experienced a reaction while receiving the iron infusion. She was nauseated and experienced emesis and was hypotensive. She required IV fluids and epinephrine.     Her hemoglobin was 13.5 at the last visit.  Her vitamin D level was low.  Her hemoglobin A1c was 5.7%.  Her GFR was low at 39 and creatinine was 1.30.     Additionally, she has history of a previous ischemic stroke involving the posterior putamen. She had been treated with aspirin and Plavix. Given her anemia and gastrointestinal bleed her Plavix was discontinued. She does not believe she has had significant residual symptoms from her stroke.      It should be noted that she had evaluation by gastroenterology and an upper endoscopy and colonoscopy were recommended given iron deficiency anemia.  She declined this and continues to not want to have this testing done.  She has started iron supplementation.  She also was noted to have a low B12 level and low folate level.     She continues to live independently and has generally been getting along well.  Her mood has been stable.  She has been consistent in taking sertraline.        Objective    /76 (BP Location: Left arm, Patient Position: Sitting, Cuff Size: Adult Regular)   Pulse 67   Temp 98.2  F (36.8  C) (Oral)   Resp 18   Ht 1.626 m (5' 4\")   Wt 68.4 kg (150 lb 12.8 oz)   LMP  (LMP Unknown)   SpO2 90%   BMI 25.88 kg/m    Body mass index is 25.88 kg/m .  Physical Exam   GENERAL: alert and no distress  EYES: Eyes grossly normal to inspection, PERRL and conjunctivae and sclerae normal  RESP: lungs clear to " auscultation - no rales, rhonchi or wheezes  CV: regular rate and rhythm, normal S1 S2, no S3 or S4, no murmur, click or rub, no peripheral edema  SKIN: no suspicious lesions or rashes  NEURO: Normal strength and tone, mentation intact and speech normal  PSYCH: mentation appears normal, affect normal/bright            Signed Electronically by: Edin Dailey MD

## 2024-04-11 LAB
ALBUMIN SERPL BCG-MCNC: 4 G/DL (ref 3.5–5.2)
ALP SERPL-CCNC: 73 U/L (ref 40–150)
ALT SERPL W P-5'-P-CCNC: 9 U/L (ref 0–50)
ANION GAP SERPL CALCULATED.3IONS-SCNC: 9 MMOL/L (ref 7–15)
AST SERPL W P-5'-P-CCNC: 17 U/L (ref 0–45)
BILIRUB SERPL-MCNC: 0.2 MG/DL
BUN SERPL-MCNC: 27.6 MG/DL (ref 8–23)
CALCIUM SERPL-MCNC: 8.4 MG/DL (ref 8.8–10.2)
CHLORIDE SERPL-SCNC: 104 MMOL/L (ref 98–107)
CREAT SERPL-MCNC: 1.25 MG/DL (ref 0.51–0.95)
DEPRECATED HCO3 PLAS-SCNC: 25 MMOL/L (ref 22–29)
EGFRCR SERPLBLD CKD-EPI 2021: 41 ML/MIN/1.73M2
FERRITIN SERPL-MCNC: 28 NG/ML (ref 11–328)
GLUCOSE SERPL-MCNC: 99 MG/DL (ref 70–99)
IRON BINDING CAPACITY (ROCHE): 291 UG/DL (ref 240–430)
IRON SATN MFR SERPL: 18 % (ref 15–46)
IRON SERPL-MCNC: 53 UG/DL (ref 37–145)
POTASSIUM SERPL-SCNC: 5.4 MMOL/L (ref 3.4–5.3)
PROT SERPL-MCNC: 6.7 G/DL (ref 6.4–8.3)
SODIUM SERPL-SCNC: 138 MMOL/L (ref 135–145)
TSH SERPL DL<=0.005 MIU/L-ACNC: 3.59 UIU/ML (ref 0.3–4.2)
VIT B12 SERPL-MCNC: 234 PG/ML (ref 232–1245)
VIT D+METAB SERPL-MCNC: 10 NG/ML (ref 20–50)

## 2024-05-06 DIAGNOSIS — G47.00 INSOMNIA, UNSPECIFIED TYPE: ICD-10-CM

## 2024-05-06 RX ORDER — TRAZODONE HYDROCHLORIDE 50 MG/1
50 TABLET, FILM COATED ORAL AT BEDTIME
Qty: 30 TABLET | Refills: 1 | OUTPATIENT
Start: 2024-05-06

## 2024-05-08 DIAGNOSIS — F39 MOOD DISORDER (H): ICD-10-CM

## 2024-05-08 RX ORDER — SERTRALINE HYDROCHLORIDE 100 MG/1
TABLET, FILM COATED ORAL
Qty: 90 TABLET | Refills: 2 | Status: SHIPPED | OUTPATIENT
Start: 2024-05-08

## 2024-05-10 RX ORDER — TRAZODONE HYDROCHLORIDE 50 MG/1
50 TABLET, FILM COATED ORAL AT BEDTIME
Qty: 30 TABLET | Refills: 0 | Status: SHIPPED | OUTPATIENT
Start: 2024-05-10 | End: 2024-06-18

## 2024-06-18 DIAGNOSIS — G47.00 INSOMNIA, UNSPECIFIED TYPE: ICD-10-CM

## 2024-06-18 RX ORDER — TRAZODONE HYDROCHLORIDE 50 MG/1
50 TABLET, FILM COATED ORAL AT BEDTIME
Qty: 30 TABLET | Refills: 2 | Status: SHIPPED | OUTPATIENT
Start: 2024-06-18 | End: 2024-09-23

## 2024-09-16 ENCOUNTER — MYC MEDICAL ADVICE (OUTPATIENT)
Dept: FAMILY MEDICINE | Facility: CLINIC | Age: 89
End: 2024-09-16
Payer: COMMERCIAL

## 2024-09-17 NOTE — TELEPHONE ENCOUNTER
Remedios hasn't been seen since April. Do you want to see her for a visit to complete these forms? In person or virtual?

## 2024-09-23 ENCOUNTER — MEDICAL CORRESPONDENCE (OUTPATIENT)
Dept: HEALTH INFORMATION MANAGEMENT | Facility: CLINIC | Age: 89
End: 2024-09-23

## 2024-09-23 ENCOUNTER — VIRTUAL VISIT (OUTPATIENT)
Dept: FAMILY MEDICINE | Facility: CLINIC | Age: 89
End: 2024-09-23
Payer: COMMERCIAL

## 2024-09-23 DIAGNOSIS — N18.32 STAGE 3B CHRONIC KIDNEY DISEASE (H): ICD-10-CM

## 2024-09-23 DIAGNOSIS — Z02.89 ENCOUNTER FOR COMPLETION OF FORM WITH PATIENT: Primary | ICD-10-CM

## 2024-09-23 DIAGNOSIS — F41.1 ANXIETY STATE: ICD-10-CM

## 2024-09-23 DIAGNOSIS — I10 ESSENTIAL HYPERTENSION: ICD-10-CM

## 2024-09-23 DIAGNOSIS — E78.5 HYPERLIPIDEMIA, UNSPECIFIED HYPERLIPIDEMIA TYPE: ICD-10-CM

## 2024-09-23 DIAGNOSIS — E55.9 VITAMIN D DEFICIENCY: ICD-10-CM

## 2024-09-23 PROCEDURE — G2211 COMPLEX E/M VISIT ADD ON: HCPCS | Mod: 95 | Performed by: FAMILY MEDICINE

## 2024-09-23 PROCEDURE — 99214 OFFICE O/P EST MOD 30 MIN: CPT | Mod: 95 | Performed by: FAMILY MEDICINE

## 2024-09-23 NOTE — PROGRESS NOTES
"Remedios is a 89 year old who is being evaluated via a billable video visit.    How would you like to obtain your AVS? MyChart  If the video visit is dropped, the invitation should be resent by: Text to cell phone: [phone]  Will anyone else be joining your video visit? No      Assessment & Plan     Encounter for completion of form with patient    Reviewed POLST forms  She wishes to be DNRI/DNI  See forms which were reviewed with her daughter Hannah and with patient    Essential hypertension    Continue lisinopril and metoprolol  Continue to monitor blood pressure.     Hyperlipidemia, unspecified hyperlipidemia type    Continue to work on dietary and lifestyle changes    Stage 3b chronic kidney disease (H)    Have reviewed   Recommend good control of blood pressure  Recommend avoiding NSAIDs and nephrotoxic substances  Continue to monitor    Anxiety    Currently stable  Continue sertraline    Vitamin D deficiency    Vitamin D level is low  Recommend vitamin D supplementation    Spent 30 minutes including time for chart preparation and review was well as time with patient and daughter in reviewing the plan and POLST forms.    The longitudinal plan of care for the diagnosis(es)/condition(s) as documented were addressed during this visit. Due to the added complexity in care, I will continue to support Remedios in the subsequent management and with ongoing continuity of care.        BMI  Estimated body mass index is 25.88 kg/m  as calculated from the following:    Height as of 4/10/24: 1.626 m (5' 4\").    Weight as of 4/10/24: 68.4 kg (150 lb 12.8 oz).             Subjective   Remedios is a 89 year old, presenting for the following health issues:  Forms (Forms need to be completed for assisted living )        9/23/2024     2:28 PM   Additional Questions   Roomed by Lora HA CMA       Video Start Time:  3:10    History of Present Illness       Reason for visit:  Forms for assisted living   She is taking medications " regularly.     Remedios presents via a virtual visit. She is moving into assisted living and forms need to be reviewed. She will be moving into a senior living facility.    POLST forms are needed. The patient expresses she would like to be DNR/DNI. See completed forms.    She has a complex health history.    As noted at previous visits, she has had a history of chronic fatigue as well as anemia.     At the last visit we reviewed a hospital admission in 2022 for an acute on chronic anemia and iron deficiency which likely is secondary to a chronic gastrointestinal bleed. Her fecal occult blood test was abnormal.  A CT of the abdomen showed thickening of the left hemicolon consistent with segmental colitis.  Her hemoglobin was 5.2 on presentation and she received 2 units of red blood cells via transfusion.  Given concern for gastrointestinal bleed she was advised to consider endoscopy but she declined. She was treated with IV iron.  She was discharged to home and has not follow-up since then.  It should be noted that she experienced a reaction while receiving the iron infusion. She was nauseated and experienced emesis and was hypotensive. She required IV fluids and epinephrine.      Her hemoglobin was 13.5 at the last visit.  Her vitamin D level was low.  Her hemoglobin A1c was 5.7%.  Her GFR was low at 39 and creatinine was 1.30.     Additionally, she has history of a previous ischemic stroke involving the posterior putamen. She had been treated with aspirin and Plavix. Given her anemia and gastrointestinal bleed her Plavix was discontinued. She does not believe she has had significant residual symptoms from her stroke.      It should be noted that she had evaluation by gastroenterology and an upper endoscopy and colonoscopy were recommended given iron deficiency anemia.  She declined this and continues to not want to have this testing done.  She has started iron supplementation.  She also was noted to have a low B12  level and low folate level.               Review of Systems  Constitutional, HEENT, cardiovascular, pulmonary, gi and gu systems are negative, except as otherwise noted.      Objective           Vitals:  No vitals were obtained today due to virtual visit.    Physical Exam   GENERAL: alert and no distress  RESP: No audible wheeze, cough, or visible cyanosis.    PSYCH: Appropriate affect, tone, and pace of words          Video-Visit Details    Type of service:  Video Visit   Video End Time: 3:30  Originating Location (pt. Location): Home    Distant Location (provider location):  On-site  Platform used for Video Visit: Doximity and telephone  Signed Electronically by: Edin Dailey MD

## 2024-09-23 NOTE — TELEPHONE ENCOUNTER
Please call. I would like to see her to review. Please see if there is a day she can come in and I will make it work.

## 2024-10-08 ENCOUNTER — MEDICAL CORRESPONDENCE (OUTPATIENT)
Dept: HEALTH INFORMATION MANAGEMENT | Facility: CLINIC | Age: 89
End: 2024-10-08
Payer: COMMERCIAL

## 2024-10-21 ENCOUNTER — DOCUMENTATION ONLY (OUTPATIENT)
Dept: OTHER | Facility: CLINIC | Age: 89
End: 2024-10-21
Payer: COMMERCIAL

## 2024-11-16 ENCOUNTER — HEALTH MAINTENANCE LETTER (OUTPATIENT)
Age: 89
End: 2024-11-16